# Patient Record
Sex: MALE | Race: WHITE | NOT HISPANIC OR LATINO | Employment: OTHER | URBAN - METROPOLITAN AREA
[De-identification: names, ages, dates, MRNs, and addresses within clinical notes are randomized per-mention and may not be internally consistent; named-entity substitution may affect disease eponyms.]

---

## 2017-07-27 ENCOUNTER — OFFICE VISIT (OUTPATIENT)
Dept: URGENT CARE | Facility: CLINIC | Age: 82
End: 2017-07-27
Payer: MEDICARE

## 2017-07-27 VITALS
BODY MASS INDEX: 29.82 KG/M2 | TEMPERATURE: 97 F | WEIGHT: 190 LBS | OXYGEN SATURATION: 95 % | HEART RATE: 69 BPM | RESPIRATION RATE: 18 BRPM | SYSTOLIC BLOOD PRESSURE: 132 MMHG | HEIGHT: 67 IN | DIASTOLIC BLOOD PRESSURE: 78 MMHG

## 2017-07-27 DIAGNOSIS — J02.9 ACUTE PHARYNGITIS, UNSPECIFIED ETIOLOGY: Primary | ICD-10-CM

## 2017-07-27 PROCEDURE — 96372 THER/PROPH/DIAG INJ SC/IM: CPT | Mod: S$GLB,,, | Performed by: INTERNAL MEDICINE

## 2017-07-27 PROCEDURE — 99213 OFFICE O/P EST LOW 20 MIN: CPT | Mod: 25,S$GLB,, | Performed by: INTERNAL MEDICINE

## 2017-07-27 PROCEDURE — 1159F MED LIST DOCD IN RCRD: CPT | Mod: S$GLB,,, | Performed by: INTERNAL MEDICINE

## 2017-07-27 RX ORDER — BETAMETHASONE SODIUM PHOSPHATE AND BETAMETHASONE ACETATE 3; 3 MG/ML; MG/ML
9 INJECTION, SUSPENSION INTRA-ARTICULAR; INTRALESIONAL; INTRAMUSCULAR; SOFT TISSUE ONCE
Status: COMPLETED | OUTPATIENT
Start: 2017-07-27 | End: 2017-07-27

## 2017-07-27 RX ORDER — ISOSORBIDE MONONITRATE 20 MG/1
10 TABLET ORAL 2 TIMES DAILY WITH MEALS
COMMUNITY
End: 2022-09-07 | Stop reason: SDUPTHER

## 2017-07-27 RX ORDER — NAPROXEN SODIUM 220 MG/1
81 TABLET, FILM COATED ORAL DAILY
COMMUNITY
End: 2022-09-07 | Stop reason: DRUGHIGH

## 2017-07-27 RX ORDER — AZITHROMYCIN 250 MG/1
TABLET, FILM COATED ORAL
Qty: 6 TABLET | Refills: 0 | Status: SHIPPED | OUTPATIENT
Start: 2017-07-27 | End: 2022-09-07 | Stop reason: SDUPTHER

## 2017-07-27 RX ORDER — METOPROLOL TARTRATE 100 MG/1
100 TABLET ORAL 2 TIMES DAILY
COMMUNITY

## 2017-07-27 RX ORDER — LEVOTHYROXINE SODIUM 100 UG/1
100 TABLET ORAL DAILY
COMMUNITY
End: 2022-11-22 | Stop reason: SDUPTHER

## 2017-07-27 RX ADMIN — BETAMETHASONE SODIUM PHOSPHATE AND BETAMETHASONE ACETATE 9 MG: 3; 3 INJECTION, SUSPENSION INTRA-ARTICULAR; INTRALESIONAL; INTRAMUSCULAR; SOFT TISSUE at 02:07

## 2017-07-27 NOTE — PROGRESS NOTES
"Subjective:       Patient ID: Claude T Dupuis is a 88 y.o. male.    Vitals:  height is 5' 7" (1.702 m) and weight is 86.2 kg (190 lb). His oral temperature is 97.1 °F (36.2 °C). His blood pressure is 132/78 and his pulse is 69. His respiration is 18 and oxygen saturation is 95%.     Chief Complaint: Sore Throat and Fever           Sore Throat    This is a new problem. The current episode started in the past 7 days. The problem has been unchanged. Neither side of throat is experiencing more pain than the other. The pain is at a severity of 10/10. The pain is severe. Associated symptoms include coughing. Pertinent negatives include no abdominal pain, congestion, ear pain, headaches, hoarse voice or shortness of breath. He has tried nothing for the symptoms.   Fever    Associated symptoms include coughing and a sore throat. Pertinent negatives include no abdominal pain, chest pain, congestion, ear pain, headaches, nausea or wheezing.     Review of Systems   Constitution: Positive for fever. Negative for chills and malaise/fatigue.   HENT: Positive for sore throat. Negative for congestion, ear pain, headaches and hoarse voice.    Eyes: Negative for discharge and redness.   Cardiovascular: Negative for chest pain, dyspnea on exertion and leg swelling.   Respiratory: Positive for cough. Negative for shortness of breath, sputum production and wheezing.    Musculoskeletal: Negative for myalgias.   Gastrointestinal: Negative for abdominal pain and nausea.       Objective:      Physical Exam    Assessment:       1. Acute pharyngitis, unspecified etiology        Plan:         Acute pharyngitis, unspecified etiology  -     azithromycin (ZITHROMAX Z-GORGE) 250 MG tablet; Take 2 tablets (500 mg) on  Day 1,  followed by 1 tablet (250 mg) once daily on Days 2 through 5.  Dispense: 6 tablet; Refill: 0          "

## 2018-06-28 ENCOUNTER — OFFICE VISIT (OUTPATIENT)
Dept: URGENT CARE | Facility: CLINIC | Age: 83
End: 2018-06-28
Payer: MEDICARE

## 2018-06-28 VITALS
HEART RATE: 74 BPM | OXYGEN SATURATION: 98 % | HEIGHT: 67 IN | SYSTOLIC BLOOD PRESSURE: 177 MMHG | BODY MASS INDEX: 29.82 KG/M2 | TEMPERATURE: 98 F | RESPIRATION RATE: 18 BRPM | DIASTOLIC BLOOD PRESSURE: 72 MMHG | WEIGHT: 190 LBS

## 2018-06-28 DIAGNOSIS — S66.911A STRAIN OF RIGHT WRIST, INITIAL ENCOUNTER: ICD-10-CM

## 2018-06-28 DIAGNOSIS — S66.911A STRAIN OF RIGHT WRIST, INITIAL ENCOUNTER: Primary | ICD-10-CM

## 2018-06-28 PROCEDURE — 99213 OFFICE O/P EST LOW 20 MIN: CPT | Mod: S$GLB,,, | Performed by: INTERNAL MEDICINE

## 2018-06-28 RX ORDER — NAPROXEN 500 MG/1
TABLET ORAL
Qty: 20 TABLET | Refills: 0 | Status: SHIPPED | OUTPATIENT
Start: 2018-06-28 | End: 2022-09-28

## 2018-06-28 RX ORDER — NAPROXEN 500 MG/1
500 TABLET ORAL 2 TIMES DAILY WITH MEALS
Qty: 20 TABLET | Refills: 2 | Status: SHIPPED | OUTPATIENT
Start: 2018-06-28 | End: 2018-06-28 | Stop reason: SDUPTHER

## 2018-06-28 NOTE — PROGRESS NOTES
"Subjective:       Patient ID: Claude T Dupuis is a 89 y.o. male.    Vitals:  height is 5' 7" (1.702 m) and weight is 86.2 kg (190 lb). His temperature is 97.5 °F (36.4 °C). His blood pressure is 177/72 (abnormal) and his pulse is 74. His respiration is 18 and oxygen saturation is 98%.     Chief Complaint: Trauma    Right wrist pain from over use       Trauma   The incident occurred 6 to 12 hours ago. The incident occurred at home. The injury mechanism was a twisted joint. The injury occurred in the context of other. There is an injury to the right hand (right hand). The pain is mild. Pertinent negatives include no abdominal pain, chest pain, neck pain, numbness or weakness. There have been no prior injuries to these areas. His tetanus status is unknown.     Review of Systems   Constitution: Negative for weakness and malaise/fatigue.   HENT: Negative for nosebleeds.    Cardiovascular: Negative for chest pain and syncope.   Respiratory: Negative for shortness of breath.    Musculoskeletal: Positive for joint swelling, muscle weakness and stiffness. Negative for arthritis, back pain and neck pain.   Gastrointestinal: Negative for abdominal pain.   Genitourinary: Negative for hematuria.   Neurological: Negative for dizziness and numbness.       Objective:      Physical Exam   Constitutional: He appears well-developed and well-nourished.   HENT:   Head: Normocephalic and atraumatic.   Cardiovascular: Intact distal pulses.    Musculoskeletal:   Pain on dorsum of R wrist with ROM; motor and sensory intact   Nursing note and vitals reviewed.      Assessment:       1. Strain of right wrist, initial encounter        Plan:         Strain of right wrist, initial encounter  -     naproxen (NAPROSYN) 500 MG tablet; Take 1 tablet (500 mg total) by mouth 2 (two) times daily with meals. for 10 days  Dispense: 20 tablet; Refill: 2  -     ORTHOPEDIC BRACING FOR HOME USE - UPPER EXTREMITY          "

## 2019-04-29 ENCOUNTER — OFFICE VISIT (OUTPATIENT)
Dept: URGENT CARE | Facility: CLINIC | Age: 84
End: 2019-04-29
Payer: MEDICARE

## 2019-04-29 VITALS
BODY MASS INDEX: 29.82 KG/M2 | DIASTOLIC BLOOD PRESSURE: 82 MMHG | OXYGEN SATURATION: 97 % | HEIGHT: 67 IN | RESPIRATION RATE: 12 BRPM | HEART RATE: 74 BPM | WEIGHT: 190 LBS | TEMPERATURE: 98 F | SYSTOLIC BLOOD PRESSURE: 158 MMHG

## 2019-04-29 DIAGNOSIS — R50.9 FEVER, UNSPECIFIED FEVER CAUSE: Primary | ICD-10-CM

## 2019-04-29 LAB
CTP QC/QA: YES
FLUAV AG NPH QL: NEGATIVE
FLUBV AG NPH QL: NEGATIVE

## 2019-04-29 PROCEDURE — 71046 X-RAY EXAM CHEST 2 VIEWS: CPT | Mod: FY,S$GLB,, | Performed by: RADIOLOGY

## 2019-04-29 PROCEDURE — 96372 THER/PROPH/DIAG INJ SC/IM: CPT | Mod: S$GLB,,, | Performed by: NURSE PRACTITIONER

## 2019-04-29 PROCEDURE — 99214 PR OFFICE/OUTPT VISIT, EST, LEVL IV, 30-39 MIN: ICD-10-PCS | Mod: 25,S$GLB,, | Performed by: NURSE PRACTITIONER

## 2019-04-29 PROCEDURE — 96372 PR INJECTION,THERAP/PROPH/DIAG2ST, IM OR SUBCUT: ICD-10-PCS | Mod: S$GLB,,, | Performed by: NURSE PRACTITIONER

## 2019-04-29 PROCEDURE — 99214 OFFICE O/P EST MOD 30 MIN: CPT | Mod: 25,S$GLB,, | Performed by: NURSE PRACTITIONER

## 2019-04-29 PROCEDURE — 71046 XR CHEST PA AND LATERAL: ICD-10-PCS | Mod: FY,S$GLB,, | Performed by: RADIOLOGY

## 2019-04-29 PROCEDURE — 87804 INFLUENZA ASSAY W/OPTIC: CPT | Mod: QW,S$GLB,, | Performed by: NURSE PRACTITIONER

## 2019-04-29 PROCEDURE — 87804 POCT INFLUENZA A/B: ICD-10-PCS | Mod: QW,S$GLB,, | Performed by: NURSE PRACTITIONER

## 2019-04-29 RX ORDER — ISOSORBIDE MONONITRATE 30 MG/1
TABLET, EXTENDED RELEASE ORAL DAILY
Refills: 0 | COMMUNITY
Start: 2019-03-29 | End: 2022-11-15 | Stop reason: SDUPTHER

## 2019-04-29 RX ORDER — LEVOTHYROXINE SODIUM 100 UG/1
TABLET ORAL
COMMUNITY
Start: 2016-06-01 | End: 2022-09-07 | Stop reason: SDUPTHER

## 2019-04-29 RX ORDER — TAMSULOSIN HYDROCHLORIDE 0.4 MG/1
CAPSULE ORAL
Refills: 5 | COMMUNITY
Start: 2019-04-12 | End: 2022-09-07

## 2019-04-29 RX ORDER — AZITHROMYCIN 250 MG/1
TABLET, FILM COATED ORAL
Qty: 6 TABLET | Refills: 0 | Status: SHIPPED | OUTPATIENT
Start: 2019-04-29 | End: 2022-09-07 | Stop reason: ALTCHOICE

## 2019-04-29 RX ORDER — DOCUSATE SODIUM 100 MG/1
CAPSULE, LIQUID FILLED ORAL
COMMUNITY
Start: 2016-06-01 | End: 2022-09-07

## 2019-04-29 RX ORDER — CEFTRIAXONE 1 G/1
1 INJECTION, POWDER, FOR SOLUTION INTRAMUSCULAR; INTRAVENOUS
Status: COMPLETED | OUTPATIENT
Start: 2019-04-29 | End: 2019-04-29

## 2019-04-29 RX ORDER — ISOSORBIDE MONONITRATE 30 MG/1
TABLET, EXTENDED RELEASE ORAL
COMMUNITY
Start: 2016-06-01 | End: 2022-09-07 | Stop reason: SDUPTHER

## 2019-04-29 RX ORDER — TAMSULOSIN HYDROCHLORIDE 0.4 MG/1
CAPSULE ORAL
COMMUNITY
Start: 2016-06-01 | End: 2022-09-07

## 2019-04-29 RX ORDER — LISINOPRIL 20 MG/1
20 TABLET ORAL DAILY
COMMUNITY
Start: 2017-06-01 | End: 2023-09-27

## 2019-04-29 RX ADMIN — CEFTRIAXONE 1 G: 1 INJECTION, POWDER, FOR SOLUTION INTRAMUSCULAR; INTRAVENOUS at 08:04

## 2019-04-29 NOTE — PROGRESS NOTES
"Subjective:       Patient ID: Claude T Dupuis is a 89 y.o. male.    Vitals:  height is 5' 7" (1.702 m) and weight is 86.2 kg (190 lb). His temperature is 97.9 °F (36.6 °C). His blood pressure is 158/82 (abnormal) and his pulse is 74. His respiration is 12.     Chief Complaint: No chief complaint on file.    Pt c/o fever and generalized weakness, x 2 days     Fever    This is a new problem. The problem occurs constantly. The problem has been unchanged. The maximum temperature noted was 102 to 102.9 F. The temperature was taken using an oral thermometer. Pertinent negatives include no congestion, coughing, ear pain, headaches, nausea, rash, sore throat, vomiting or wheezing. He has tried acetaminophen for the symptoms. The treatment provided mild relief.       Constitution: Positive for chills, fatigue, fever (102.0 last night ) and generalized weakness. Negative for sweating.   HENT: Negative for ear pain, congestion, sinus pain, sinus pressure, sore throat and voice change.    Neck: Negative for painful lymph nodes.   Eyes: Negative for eye redness.   Respiratory: Negative for chest tightness, cough, sputum production, bloody sputum, COPD, shortness of breath, stridor, wheezing and asthma.    Gastrointestinal: Negative for nausea and vomiting.   Musculoskeletal: Negative for muscle ache.   Skin: Negative for rash.   Allergic/Immunologic: Negative for seasonal allergies and asthma.   Neurological: Negative for dizziness, light-headedness and headaches.   Hematologic/Lymphatic: Negative for swollen lymph nodes.       Objective:      Physical Exam   Constitutional: He is oriented to person, place, and time. Vital signs are normal. He appears well-developed and well-nourished. He is active and cooperative.  Non-toxic appearance. He does not have a sickly appearance. He does not appear ill. No distress.   HENT:   Head: Normocephalic and atraumatic.   Right Ear: Hearing, tympanic membrane, external ear and ear canal " normal.   Left Ear: Hearing, tympanic membrane, external ear and ear canal normal.   Nose: No mucosal edema, rhinorrhea or nasal deformity. No epistaxis. Right sinus exhibits no maxillary sinus tenderness and no frontal sinus tenderness. Left sinus exhibits no maxillary sinus tenderness and no frontal sinus tenderness.   Mouth/Throat: Uvula is midline and mucous membranes are normal. No trismus in the jaw. Normal dentition. No uvula swelling. No oropharyngeal exudate, posterior oropharyngeal edema, posterior oropharyngeal erythema or tonsillar abscesses. No tonsillar exudate.   Eyes: Pupils are equal, round, and reactive to light. Conjunctivae, EOM and lids are normal. No scleral icterus.   Sclera clear bilat   Neck: Trachea normal, full passive range of motion without pain and phonation normal. Neck supple.   Cardiovascular: Normal rate, regular rhythm, normal heart sounds and intact distal pulses.   Pulses:       Radial pulses are 2+ on the right side, and 2+ on the left side.   Pulmonary/Chest: Effort normal and breath sounds normal. No respiratory distress.   Musculoskeletal: Normal range of motion. He exhibits no edema or deformity.   Neurological: He is alert and oriented to person, place, and time. He has normal strength. Gait normal.   Skin: Skin is warm, dry and intact. Capillary refill takes less than 2 seconds. No rash noted. He is not diaphoretic. No pallor.   Psychiatric: He has a normal mood and affect. His speech is normal and behavior is normal. Judgment and thought content normal. Cognition and memory are normal.   Nursing note and vitals reviewed.      Assessment:       1. Fever, unspecified fever cause        Results for orders placed or performed in visit on 04/29/19   POCT Influenza A/B   Result Value Ref Range    Rapid Influenza A Ag Negative Negative    Rapid Influenza B Ag Negative Negative     Acceptable Yes        Plan:       Vitals reassuring.  X-ray noted to be down at the  time and radiology unable to read the imaging. I will call and follow up with patient on tomorrow.  Patient showed no signs of acute respiratory distress.  Patient denies any chest pain shortness of breath or abdominal pain at this time.  The patient agreed with treatment plan.  Patient will follow-up with primary care provider for further evaluation.     Call patient to follow up. Patient states he feels a lot better today. Discussed radiology report. Patient verbalized understanding.    Fever, unspecified fever cause  -     POCT Influenza A/B  -     XR CHEST PA AND LATERAL; Future; Expected date: 04/29/2019  -     cefTRIAXone injection 1 g  -     azithromycin (Z-GORGE) 250 MG tablet; Take 2 tablets by mouth on day 1; Take 1 tablet by mouth on days 2-5  Dispense: 6 tablet; Refill: 0      Patient Instructions   If you were prescribed a narcotic or controlled medication, do not drive or operate heavy equipment or machinery while taking these medications.  You must understand that you've received an Urgent Care treatment only and that you may be released before all your medical problems are known or treated. You, the patient, will arrange for follow up care as instructed.  Follow up with your PCP or specialty clinic as directed within 2-5 days if not improved or as needed.  You can call [phone] to schedule an appointment with the appropriate provider.  If your condition worsens we recommend that you receive another evaluation at the emergency room immediately or contact your primary medical clinics after hours call service to discuss your concerns.  Please return here or go to the Emergency Department for any concerns or worsening of condition.      Pneumonia (Adult)  Pneumonia is an infection deep within the lungs. It is in the small air sacs (alveoli). Pneumonia may be caused by a virus or bacteria. Pneumonia caused by bacteria is usually treated with an antibiotic. Severe cases may need to be treated in the  hospital. Milder cases can be treated at home. Symptoms usually start to get better during the first 2 days of treatment.    Home care  Follow these guidelines when caring for yourself at home:  · Rest at home for the first 2 to 3 days, or until you feel stronger. Dont let yourself get overly tired when you go back to your activities.  · Stay away from cigarette smoke - yours or other peoples.  · You may use acetaminophen or ibuprofen to control fever or pain, unless another medicine was prescribed. If you have chronic liver or kidney disease, talk with your healthcare provider before using these medicines. Also talk with your provider if youve had a stomach ulcer or gastrointestinal bleeding. Dont give aspirin to anyone younger than 18 years of age who is ill with a fever. It may cause severe liver damage.  · Your appetite may be poor, so a light diet is fine.  · Drink 6 to 8 glasses of fluids every day to make sure you are getting enough fluids. Beverages can include water, sport drinks, sodas without caffeine, juices, tea, or soup. Fluids will help loosen secretions in the lung. This will make it easier for you to cough up the phlegm (sputum). If you also have heart or kidney disease, check with your healthcare provider before you drink extra fluids.  · Take antibiotic medicine prescribed until it is all gone, even if you are feeling better after a few days.  Follow-up care  Follow up with your healthcare provider in the next 2 to 3 days, or as advised. This is to be sure the medicine is helping you get better.  If you are 65 or older, you should get a pneumococcal vaccine and a yearly flu (influenza) shot. You should also get these vaccines if you have chronic lung disease like asthma, emphysema, or COPD. Recently, a second type of pneumonia vaccine has become available for everyone over 65 years old. This is in addition to the previous vaccine. Ask your provider about this.  When to seek medical  advice  Call your healthcare provider right away if any of these occur:  · You dont get better within the first 48 hours of treatment  · Shortness of breath gets worse  · Rapid breathing (more than 25 breaths per minute)  · Coughing up blood  · Chest pain gets worse with breathing  · Fever of 100.4°F (38°C) or higher that doesnt get better with fever medicine  · Weakness, dizziness, or fainting that gets worse  · Thirst or dry mouth that gets worse  · Sinus pain, headache, or a stiff neck  · Chest pain not caused by coughing  Date Last Reviewed: 1/1/2017  © 4159-4377 [employer]. [address] [place] [address], [place] [address]. All rights reserved. This information is not intended as a substitute for professional medical care. Always follow your healthcare professional's instructions.

## 2019-04-30 NOTE — PATIENT INSTRUCTIONS
If you were prescribed a narcotic or controlled medication, do not drive or operate heavy equipment or machinery while taking these medications.  You must understand that you've received an Urgent Care treatment only and that you may be released before all your medical problems are known or treated. You, the patient, will arrange for follow up care as instructed.  Follow up with your PCP or specialty clinic as directed within 2-5 days if not improved or as needed.  You can call [phone] to schedule an appointment with the appropriate provider.  If your condition worsens we recommend that you receive another evaluation at the emergency room immediately or contact your primary medical clinics after hours call service to discuss your concerns.  Please return here or go to the Emergency Department for any concerns or worsening of condition.      Pneumonia (Adult)  Pneumonia is an infection deep within the lungs. It is in the small air sacs (alveoli). Pneumonia may be caused by a virus or bacteria. Pneumonia caused by bacteria is usually treated with an antibiotic. Severe cases may need to be treated in the hospital. Milder cases can be treated at home. Symptoms usually start to get better during the first 2 days of treatment.    Home care  Follow these guidelines when caring for yourself at home:  · Rest at home for the first 2 to 3 days, or until you feel stronger. Dont let yourself get overly tired when you go back to your activities.  · Stay away from cigarette smoke - yours or other peoples.  · You may use acetaminophen or ibuprofen to control fever or pain, unless another medicine was prescribed. If you have chronic liver or kidney disease, talk with your healthcare provider before using these medicines. Also talk with your provider if youve had a stomach ulcer or gastrointestinal bleeding. Dont give aspirin to anyone younger than 18 years of age who is ill with a fever. It may cause severe liver  damage.  · Your appetite may be poor, so a light diet is fine.  · Drink 6 to 8 glasses of fluids every day to make sure you are getting enough fluids. Beverages can include water, sport drinks, sodas without caffeine, juices, tea, or soup. Fluids will help loosen secretions in the lung. This will make it easier for you to cough up the phlegm (sputum). If you also have heart or kidney disease, check with your healthcare provider before you drink extra fluids.  · Take antibiotic medicine prescribed until it is all gone, even if you are feeling better after a few days.  Follow-up care  Follow up with your healthcare provider in the next 2 to 3 days, or as advised. This is to be sure the medicine is helping you get better.  If you are 65 or older, you should get a pneumococcal vaccine and a yearly flu (influenza) shot. You should also get these vaccines if you have chronic lung disease like asthma, emphysema, or COPD. Recently, a second type of pneumonia vaccine has become available for everyone over 65 years old. This is in addition to the previous vaccine. Ask your provider about this.  When to seek medical advice  Call your healthcare provider right away if any of these occur:  · You dont get better within the first 48 hours of treatment  · Shortness of breath gets worse  · Rapid breathing (more than 25 breaths per minute)  · Coughing up blood  · Chest pain gets worse with breathing  · Fever of 100.4°F (38°C) or higher that doesnt get better with fever medicine  · Weakness, dizziness, or fainting that gets worse  · Thirst or dry mouth that gets worse  · Sinus pain, headache, or a stiff neck  · Chest pain not caused by coughing  Date Last Reviewed: 1/1/2017  © 0703-4319 [employer]. [address] [place] [address], [place] [address]. All rights reserved. This information is not intended as a substitute for professional medical care. Always follow your healthcare professional's instructions.

## 2021-07-26 ENCOUNTER — OFFICE VISIT (OUTPATIENT)
Dept: URGENT CARE | Facility: CLINIC | Age: 86
End: 2021-07-26
Payer: MEDICARE

## 2021-07-26 VITALS
WEIGHT: 195 LBS | SYSTOLIC BLOOD PRESSURE: 164 MMHG | RESPIRATION RATE: 16 BRPM | HEIGHT: 67 IN | BODY MASS INDEX: 30.61 KG/M2 | DIASTOLIC BLOOD PRESSURE: 70 MMHG | OXYGEN SATURATION: 94 % | HEART RATE: 67 BPM | TEMPERATURE: 99 F

## 2021-07-26 DIAGNOSIS — U07.1 COVID-19 VIRUS INFECTION: Primary | ICD-10-CM

## 2021-07-26 LAB
CTP QC/QA: YES
SARS-COV-2 RDRP RESP QL NAA+PROBE: POSITIVE

## 2021-07-26 PROCEDURE — U0002: ICD-10-PCS | Mod: QW,S$GLB,, | Performed by: STUDENT IN AN ORGANIZED HEALTH CARE EDUCATION/TRAINING PROGRAM

## 2021-07-26 PROCEDURE — 1160F RVW MEDS BY RX/DR IN RCRD: CPT | Mod: CPTII,S$GLB,, | Performed by: STUDENT IN AN ORGANIZED HEALTH CARE EDUCATION/TRAINING PROGRAM

## 2021-07-26 PROCEDURE — 1160F PR REVIEW ALL MEDS BY PRESCRIBER/CLIN PHARMACIST DOCUMENTED: ICD-10-PCS | Mod: CPTII,S$GLB,, | Performed by: STUDENT IN AN ORGANIZED HEALTH CARE EDUCATION/TRAINING PROGRAM

## 2021-07-26 PROCEDURE — U0002 COVID-19 LAB TEST NON-CDC: HCPCS | Mod: QW,S$GLB,, | Performed by: STUDENT IN AN ORGANIZED HEALTH CARE EDUCATION/TRAINING PROGRAM

## 2021-07-26 PROCEDURE — 99212 PR OFFICE/OUTPT VISIT, EST, LEVL II, 10-19 MIN: ICD-10-PCS | Mod: S$GLB,,, | Performed by: STUDENT IN AN ORGANIZED HEALTH CARE EDUCATION/TRAINING PROGRAM

## 2021-07-26 PROCEDURE — 1159F PR MEDICATION LIST DOCUMENTED IN MEDICAL RECORD: ICD-10-PCS | Mod: CPTII,S$GLB,, | Performed by: STUDENT IN AN ORGANIZED HEALTH CARE EDUCATION/TRAINING PROGRAM

## 2021-07-26 PROCEDURE — 99212 OFFICE O/P EST SF 10 MIN: CPT | Mod: S$GLB,,, | Performed by: STUDENT IN AN ORGANIZED HEALTH CARE EDUCATION/TRAINING PROGRAM

## 2021-07-26 PROCEDURE — 1159F MED LIST DOCD IN RCRD: CPT | Mod: CPTII,S$GLB,, | Performed by: STUDENT IN AN ORGANIZED HEALTH CARE EDUCATION/TRAINING PROGRAM

## 2022-09-07 ENCOUNTER — OFFICE VISIT (OUTPATIENT)
Dept: CARDIOLOGY | Facility: CLINIC | Age: 87
End: 2022-09-07
Payer: MEDICARE

## 2022-09-07 ENCOUNTER — LAB VISIT (OUTPATIENT)
Dept: LAB | Facility: HOSPITAL | Age: 87
End: 2022-09-07
Attending: INTERNAL MEDICINE
Payer: MEDICARE

## 2022-09-07 VITALS
SYSTOLIC BLOOD PRESSURE: 144 MMHG | HEIGHT: 67 IN | WEIGHT: 189.38 LBS | DIASTOLIC BLOOD PRESSURE: 72 MMHG | BODY MASS INDEX: 29.72 KG/M2 | HEART RATE: 52 BPM

## 2022-09-07 DIAGNOSIS — I20.9 ANGINA PECTORIS: ICD-10-CM

## 2022-09-07 DIAGNOSIS — Z95.1 HX OF CABG: ICD-10-CM

## 2022-09-07 DIAGNOSIS — I50.31 ACUTE DIASTOLIC HEART FAILURE: ICD-10-CM

## 2022-09-07 DIAGNOSIS — L97.922 ULCER OF LEFT LOWER EXTREMITY WITH FAT LAYER EXPOSED: Primary | ICD-10-CM

## 2022-09-07 DIAGNOSIS — M79.605 LEFT LEG PAIN: ICD-10-CM

## 2022-09-07 DIAGNOSIS — R60.0 EDEMA OF BOTH LOWER EXTREMITIES: ICD-10-CM

## 2022-09-07 DIAGNOSIS — I87.2 VENOUS STASIS DERMATITIS OF BOTH LOWER EXTREMITIES: ICD-10-CM

## 2022-09-07 DIAGNOSIS — L97.922 ULCER OF LEFT LOWER EXTREMITY WITH FAT LAYER EXPOSED: ICD-10-CM

## 2022-09-07 DIAGNOSIS — R60.0 LOCALIZED EDEMA: ICD-10-CM

## 2022-09-07 LAB
ALBUMIN SERPL BCP-MCNC: 3.5 G/DL (ref 3.5–5.2)
ALP SERPL-CCNC: 77 U/L (ref 55–135)
ALT SERPL W/O P-5'-P-CCNC: 14 U/L (ref 10–44)
ANION GAP SERPL CALC-SCNC: 7 MMOL/L (ref 8–16)
AST SERPL-CCNC: 19 U/L (ref 10–40)
BILIRUB SERPL-MCNC: 0.6 MG/DL (ref 0.1–1)
BUN SERPL-MCNC: 19 MG/DL (ref 10–30)
CALCIUM SERPL-MCNC: 9.6 MG/DL (ref 8.7–10.5)
CHLORIDE SERPL-SCNC: 101 MMOL/L (ref 95–110)
CO2 SERPL-SCNC: 31 MMOL/L (ref 23–29)
CREAT SERPL-MCNC: 1 MG/DL (ref 0.5–1.4)
EST. GFR  (NO RACE VARIABLE): >60 ML/MIN/1.73 M^2
GLUCOSE SERPL-MCNC: 137 MG/DL (ref 70–110)
POTASSIUM SERPL-SCNC: 4 MMOL/L (ref 3.5–5.1)
PROT SERPL-MCNC: 7.1 G/DL (ref 6–8.4)
SODIUM SERPL-SCNC: 139 MMOL/L (ref 136–145)

## 2022-09-07 PROCEDURE — 99205 PR OFFICE/OUTPT VISIT, NEW, LEVL V, 60-74 MIN: ICD-10-PCS | Mod: S$GLB,,, | Performed by: INTERNAL MEDICINE

## 2022-09-07 PROCEDURE — 1101F PT FALLS ASSESS-DOCD LE1/YR: CPT | Mod: CPTII,S$GLB,, | Performed by: INTERNAL MEDICINE

## 2022-09-07 PROCEDURE — 1101F PR PT FALLS ASSESS DOC 0-1 FALLS W/OUT INJ PAST YR: ICD-10-PCS | Mod: CPTII,S$GLB,, | Performed by: INTERNAL MEDICINE

## 2022-09-07 PROCEDURE — 99205 OFFICE O/P NEW HI 60 MIN: CPT | Mod: S$GLB,,, | Performed by: INTERNAL MEDICINE

## 2022-09-07 PROCEDURE — 36415 COLL VENOUS BLD VENIPUNCTURE: CPT | Mod: PO | Performed by: INTERNAL MEDICINE

## 2022-09-07 PROCEDURE — 80053 COMPREHEN METABOLIC PANEL: CPT | Performed by: INTERNAL MEDICINE

## 2022-09-07 PROCEDURE — 1126F PR PAIN SEVERITY QUANTIFIED, NO PAIN PRESENT: ICD-10-PCS | Mod: CPTII,S$GLB,, | Performed by: INTERNAL MEDICINE

## 2022-09-07 PROCEDURE — 99999 PR PBB SHADOW E&M-EST. PATIENT-LVL III: CPT | Mod: PBBFAC,,, | Performed by: INTERNAL MEDICINE

## 2022-09-07 PROCEDURE — 99999 PR PBB SHADOW E&M-EST. PATIENT-LVL III: ICD-10-PCS | Mod: PBBFAC,,, | Performed by: INTERNAL MEDICINE

## 2022-09-07 PROCEDURE — 1159F MED LIST DOCD IN RCRD: CPT | Mod: CPTII,S$GLB,, | Performed by: INTERNAL MEDICINE

## 2022-09-07 PROCEDURE — 1159F PR MEDICATION LIST DOCUMENTED IN MEDICAL RECORD: ICD-10-PCS | Mod: CPTII,S$GLB,, | Performed by: INTERNAL MEDICINE

## 2022-09-07 PROCEDURE — 1126F AMNT PAIN NOTED NONE PRSNT: CPT | Mod: CPTII,S$GLB,, | Performed by: INTERNAL MEDICINE

## 2022-09-07 PROCEDURE — 3288F FALL RISK ASSESSMENT DOCD: CPT | Mod: CPTII,S$GLB,, | Performed by: INTERNAL MEDICINE

## 2022-09-07 PROCEDURE — 3288F PR FALLS RISK ASSESSMENT DOCUMENTED: ICD-10-PCS | Mod: CPTII,S$GLB,, | Performed by: INTERNAL MEDICINE

## 2022-09-07 RX ORDER — ASPIRIN 325 MG
325 TABLET ORAL DAILY
COMMUNITY

## 2022-09-07 RX ORDER — DOXYCYCLINE HYCLATE 100 MG
100 TABLET ORAL 2 TIMES DAILY
Qty: 28 TABLET | Refills: 1 | Status: SHIPPED | OUTPATIENT
Start: 2022-09-07 | End: 2022-09-21

## 2022-09-07 RX ORDER — BUMETANIDE 0.5 MG/1
0.5 TABLET ORAL 2 TIMES DAILY
Qty: 180 TABLET | Refills: 3 | Status: SHIPPED | OUTPATIENT
Start: 2022-09-07 | End: 2023-09-27 | Stop reason: SDUPTHER

## 2022-09-07 NOTE — PROGRESS NOTES
"[facility]      Chief Complaint   Patient presents with    Peripheral Vascular Disease    Venous Ulcer       Patient ID: Claude T Dupuis is a 89 y.o. male with CAD s/p CABG (2007), HTN, colon cancer s/p cancer, Guillain [place] Syndrome, who presents for an initial appointment.  Pertinent history/events are as follows:     -Pt presents for evaluation of left leg wound/ulcer.      HPI:  Mr. Cox is accompanied by his son.  He reports 6 weeks ago he "burned his left leg with hot water while getting into the shower.  States a large blister formed at the left lower leg.  He notes pain and burning sensation at the left lower leg.  States he went to the ED at [facility] 10 days after the injury and they "popped the blister and gave me antibiotics".        Past Medical History:   Diagnosis Date    Cancer     Colon cancer     Coronary artery disease     Hypertension      Past Surgical History:   Procedure Laterality Date    VEIN BYPASS SURGERY       Social History     Socioeconomic History    Marital status:    Tobacco Use    Smoking status: Never    Smokeless tobacco: Never   Substance and Sexual Activity    Alcohol use: No    Drug use: No    Sexual activity: Never     Family History   Problem Relation Age of Onset    Diabetes Mother        Review of patient's allergies indicates:  No Known Allergies    Medication List with Changes/Refills   Current Medications    ASPIRIN 325 MG TABLET    Take 325 mg by mouth once daily.    ISOSORBIDE MONONITRATE (IMDUR) 30 MG 24 HR TABLET    TK 1 T PO BID    LEVOTHYROXINE (SYNTHROID) 100 MCG TABLET    Take 100 mcg by mouth once daily.    LISINOPRIL (PRINIVIL,ZESTRIL) 20 MG TABLET    Take 20 mg by mouth once daily.    METOPROLOL TARTRATE (LOPRESSOR) 100 MG TABLET    Take 100 mg by mouth 2 (two) times daily.    NAPROXEN (NAPROSYN) 500 MG TABLET    TAKE 1 TABLET BY MOUTH TWICE DAILY WITH MEALS FOR 10 DAYS   Discontinued Medications    ASPIRIN 81 MG CHEW    Take 81 " "mg by mouth once daily.    AZITHROMYCIN (Z-GORGE) 250 MG TABLET    Take 2 tablets by mouth on day 1; Take 1 tablet by mouth on days 2-5    AZITHROMYCIN (ZITHROMAX Z-GORGE) 250 MG TABLET    Take 2 tablets (500 mg) on  Day 1,  followed by 1 tablet (250 mg) once daily on Days 2 through 5.    CHLORPHENIRAMINE-PSEUDOEPHEDRINE-ACETAMINOPHEN (SINUTAB) 2- MG PER TABLET    Take by mouth.    DOCUSATE SODIUM (COLACE) 100 MG CAPSULE    Take by mouth.    ISOSORBIDE MONONITRATE (IMDUR) 30 MG 24 HR TABLET    Take by mouth.    ISOSORBIDE MONONITRATE (ISMO,MONOKET) 20 MG TAB    Take 10 mg by mouth 2 (two) times daily with meals.    LEVOTHYROXINE (SYNTHROID) 100 MCG TABLET    Take by mouth.    TAMSULOSIN (FLOMAX) 0.4 MG CAP    TK 1 C PO BID    TAMSULOSIN (FLOMAX) 0.4 MG CAP    Take by mouth.       Review of Systems  Constitution: Denies chills, fever, and sweats.  HENT: Denies headaches or blurry vision.  Cardiovascular: Denies chest pain or irregular heart beat.  Respiratory: Denies cough or shortness of breath.  Gastrointestinal: Denies abdominal pain, nausea, or vomiting.  Musculoskeletal: Denies muscle cramps.  Neurological: Positive for left leg wound with pain and swelling.  Psychiatric/Behavioral: Normal mental status.  Hematologic/Lymphatic: Denies bleeding problem or easy bruising/bleeding.  Skin: Denies rash or suspicious lesions    Physical Examination  BP (!) 144/72   Pulse (!) 52   Ht 5' 7" (1.702 m)   Wt 85.9 kg (189 lb 6 oz)   BMI 29.66 kg/m²     Constitutional: No acute distress, conversant  HEENT: Sclera anicteric, Pupils equal, round and reactive to light, extraocular motions intact, Oropharynx clear  Neck: No JVD, no carotid bruits  Cardiovascular: regular rate and rhythm, no murmur, rubs or gallops, normal S1/S2  Pulmonary: Clear to auscultation bilaterally  Abdominal: Abdomen soft, nontender, nondistended, positive bowel sounds  Extremities: BLE's with 1 pitting edema and venous stasis dermatitis  LLE with " 4 x 5.5 cm ulcer/wound at medial aspect near ankle   Pulses:  Carotid pulses are 2+ on the right side, and 2+ on the left side.  Radial pulses are 2+ on the right side, and 2+ on the left side.   Femoral pulses are 2+ on the right side, and 2+ on the left side.  Popliteal pulses are 2+ on the right side, and 2+ on the left side.   Dorsalis pedis pulses are 2+ on the right side, and 2+ on the left side.   Posterior tibial pulses are 2+ on the right side, and 2+ on the left side.    Skin: No ecchymosis, erythema, or ulcers  Psych: Alert and oriented x 3, appropriate affect  Neuro: CNII-XII intact, no focal deficits    Labs:  Most Recent Data  CBC: No results found for: WBC, HGB, HCT, PLT, MCV, RDW  BMP: No results found for: NA, K, CL, CO2, BUN, CREATININE, GLU, CALCIUM, MG, PHOS  LFTS; No results found for: PROT, ALBUMIN, BILITOT, AST, ALKPHOS, ALT, GGT  COAGS: No results found for: INR, PROTIME, PTT  FLP: No results found for: CHOL, HDL, LDLCALC, TRIG, CHOLHDL  CARDIAC: No results found for: TROPONINI, CKMB, BNP    Assessment/Plan:  Claude T Dupuis is a 89 y.o. male with CAD s/p CABG (2007), HTN, colon cancer s/p cancer, Guillain [place] Syndrome, who presents for an initial appointment.    1. LLE Wound/Ulcer- Likely due to burn with possible underlying venous insufficiency.  Check BLE venous reflux study, toe pressure study and arterial ultrasound.  Refer to wound care.  Treat with doxycycline 100 mg bid for 14 days.  Start bumex 0.5 mg bid.  Check cmp today and in 1 week.  Elevate legs when resting.  Pt to use Tylenol for pain.     2. CAD s/p CABD- No angina currently.  Continue current medications.    3. HTN- Controlled.  Continue current medications.      Follow up in 3 weeks     Total duration of face to face visit time 30 minutes.  Total time spent counseling greater than fifty percent of total visit time.  Counseling included discussion regarding imaging findings, diagnosis, possibilities, treatment options,  risks and benefits.  The patient had many questions regarding the options and long-term effects.    Jose Antonio Lewis MD, PhD  Interventional Cardiology

## 2022-09-07 NOTE — PATIENT INSTRUCTIONS
Assessment/Plan:  Claude T Dupuis is a 89 y.o. male with CAD s/p CABG (2007), HTN, colon cancer s/p cancer, Guillain [place] Syndrome, who presents for an initial appointment.    1. LLE Wound/Ulcer- Likely due to burn with possible underlying venous insufficiency.  Check BLE venous reflux study, toe pressure study and arterial ultrasound.  Refer to wound care.  Treat with doxycycline 100 mg bid for 14 days.  Start bumex 0.5 mg bid.  Check cmp today and in 1 week.  Elevate legs when resting.  Pt to use Tylenol for pain.     2. CAD s/p CABD- No angina currently.  Continue current medications.    3. HTN- Controlled.  Continue current medications.      Follow up in 3 weeks

## 2022-09-08 ENCOUNTER — OFFICE VISIT (OUTPATIENT)
Dept: WOUND CARE | Facility: CLINIC | Age: 87
End: 2022-09-08
Payer: MEDICARE

## 2022-09-08 VITALS
WEIGHT: 189.38 LBS | BODY MASS INDEX: 29.72 KG/M2 | TEMPERATURE: 99 F | DIASTOLIC BLOOD PRESSURE: 71 MMHG | HEIGHT: 67 IN | HEART RATE: 67 BPM | SYSTOLIC BLOOD PRESSURE: 125 MMHG

## 2022-09-08 DIAGNOSIS — I50.31 ACUTE DIASTOLIC HEART FAILURE: ICD-10-CM

## 2022-09-08 DIAGNOSIS — I87.2 VENOUS STASIS DERMATITIS OF BOTH LOWER EXTREMITIES: ICD-10-CM

## 2022-09-08 DIAGNOSIS — L97.922 ULCER OF LEFT LOWER EXTREMITY WITH FAT LAYER EXPOSED: Primary | ICD-10-CM

## 2022-09-08 DIAGNOSIS — R60.0 EDEMA OF BOTH LOWER EXTREMITIES: ICD-10-CM

## 2022-09-08 PROCEDURE — 1159F MED LIST DOCD IN RCRD: CPT | Mod: CPTII,S$GLB,,

## 2022-09-08 PROCEDURE — 1101F PR PT FALLS ASSESS DOC 0-1 FALLS W/OUT INJ PAST YR: ICD-10-PCS | Mod: CPTII,S$GLB,,

## 2022-09-08 PROCEDURE — 3288F FALL RISK ASSESSMENT DOCD: CPT | Mod: CPTII,S$GLB,,

## 2022-09-08 PROCEDURE — 99205 PR OFFICE/OUTPT VISIT, NEW, LEVL V, 60-74 MIN: ICD-10-PCS | Mod: S$GLB,,,

## 2022-09-08 PROCEDURE — 99999 PR PBB SHADOW E&M-EST. PATIENT-LVL III: ICD-10-PCS | Mod: PBBFAC,,,

## 2022-09-08 PROCEDURE — 3288F PR FALLS RISK ASSESSMENT DOCUMENTED: ICD-10-PCS | Mod: CPTII,S$GLB,,

## 2022-09-08 PROCEDURE — 99999 PR PBB SHADOW E&M-EST. PATIENT-LVL III: CPT | Mod: PBBFAC,,,

## 2022-09-08 PROCEDURE — 1125F AMNT PAIN NOTED PAIN PRSNT: CPT | Mod: CPTII,S$GLB,,

## 2022-09-08 PROCEDURE — 1101F PT FALLS ASSESS-DOCD LE1/YR: CPT | Mod: CPTII,S$GLB,,

## 2022-09-08 PROCEDURE — 1159F PR MEDICATION LIST DOCUMENTED IN MEDICAL RECORD: ICD-10-PCS | Mod: CPTII,S$GLB,,

## 2022-09-08 PROCEDURE — 1125F PR PAIN SEVERITY QUANTIFIED, PAIN PRESENT: ICD-10-PCS | Mod: CPTII,S$GLB,,

## 2022-09-08 PROCEDURE — 99205 OFFICE O/P NEW HI 60 MIN: CPT | Mod: S$GLB,,,

## 2022-09-08 NOTE — PROGRESS NOTES
Subjective:       Patient ID: Claude T Dupuis is a 89 y.o. 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 Patient states this started about 6 weeks ago. He states he burned his leg when he was showering with hot water. A blister formed, and patient went to [facility] ED 10 days later. He reports that the ED provider popped the blister. He was instructed to use Bactroban to the affected area 3x a day. He was prescribed doxy for 10 days and prednisone for 4 days. Patient followed with Dr. Lewis on 9/7/22. He was prescribed doxy for 14 days and bumetanide. Patient reports not picking up prescriptions yet. Dr. Lewis ordered BLE venous reflux study, toe pressure study, and an arterial ultrasound. Patient reports excessive drainage and reports leaving the wound open to air. Denies fever, chills, erythema, warmth, or purulent drainage.    Review of Systems   Constitutional:  Negative for activity change, chills, diaphoresis, fatigue and fever.   Respiratory:  Negative for apnea, chest tightness and shortness of breath.    Cardiovascular:  Positive for leg swelling. Negative for chest pain and palpitations.   Musculoskeletal:  Negative for gait problem and joint swelling.   Skin:  Positive for wound. Negative for pallor and rash.   Neurological:  Negative for syncope, weakness and numbness.   Psychiatric/Behavioral:  Negative for agitation. The patient is not nervous/anxious.    All other systems reviewed and are negative.    Objective:      Physical Exam  Vitals reviewed.   Constitutional:       General: He is not in acute distress.     Appearance: Normal appearance.   HENT:      Right Ear: Decreased hearing noted.      Left Ear: Decreased hearing noted.   Cardiovascular:       Rate and Rhythm: Normal rate and regular rhythm.   Pulmonary:      Effort: No respiratory distress.   Musculoskeletal:         General: No swelling.      Right lower leg: Edema present.      Left lower leg: Edema present.   Skin:     General: Skin is warm and dry.      Findings: Wound present. No erythema.          Neurological:      General: No focal deficit present.      Mental Status: He is alert and oriented to person, place, and time.   Psychiatric:         Mood and Affect: Mood normal.         Behavior: Behavior normal.         Thought Content: Thought content normal.         Judgment: Judgment normal.       Assessment:       1. Ulcer of left lower extremity with fat layer exposed    2. Acute diastolic heart failure    3. Venous stasis dermatitis of both lower extremities    4. Edema of both lower extremities           Altered Skin Integrity Left lower;medial Leg (Active)       Altered Skin Integrity Present on Admission:    Side: Left   Orientation: lower;medial   Location: Leg   Wound Number:    Is this injury device related?:    Primary Wound Type:    Description of Altered Skin Integrity:    Ankle-Brachial Index:    Pulses:    Removal Indication and Assessment:    Wound Outcome:    (Retired) Wound Length (cm):    (Retired) Wound Width (cm):    (Retired) Depth (cm):    Wound Description (Comments):    Removal Indications:    Wound Image   09/08/22 0935   Dressing Appearance Open to air 09/08/22 0935   Drainage Amount Large 09/08/22 0935   Drainage Characteristics/Odor Clear;Yellow 09/08/22 0935   Red (%), Wound Tissue Color 100 % 09/08/22 0935   Periwound Area Intact;Macerated;Edematous 09/08/22 0935   Wound Length (cm) 4.9 cm 09/08/22 0935   Wound Width (cm) 5.8 cm 09/08/22 0935   Wound Depth (cm) 0 cm 09/08/22 0935   Wound Volume (cm^3) 0 cm^3 09/08/22 0935   Wound Surface Area (cm^2) 28.42 cm^2 09/08/22 0935   Care Cleansed with:;Soap and water;Wound cleanser 09/08/22 0935   Dressing Applied;Calcium  alginate;Island/border 09/08/22 0935   Periwound Care Skin barrier film applied 09/08/22 0935       Claude was seen in the clinic room and placed in the supine position on the treatment table.  The wound was noted to be open to air.The leg was cleansed with Easi-clense sponges and dried thoroughly. Removed dried drainage with long qtip. Pt tolerated well.     Plan of Care: Aquacel border dressing to cover the wound. Dressing will be changed every 7 days.     Plan:     Left lower extremity dressed as detailed above.   Patient was instructed to not get the dressings wet and to use cast covers for showering.  Should the dressing become wet, he is to remove it, place another aquacel border dressing to the area. He should then notify this office as soon as possible to have a new dressing applied.  Discussed home health. Deferred at this time.     Discussed PVD and PAD with patient. Patient has history of severe stenosis of right popliteal artery and mild stenosis of the proximal left SFA. Patient's son states that Dr. Lewis instructed patient to not utilize compression for left lower extremity. Will consider utilizing compression after toe pressure study, arterial ultrasound, and venous reflux study. Instructed patient to minimize ambulation, elevate legs whenever sedentary, and follow a low sodium diet.       Discussed nutrition and the role of protein in wound healing with the patient. Instructed patient to eat 5 small meals of protein a day. Gave a handout illustrating healthy protein options. Pt voiced understanding.    Discussed stages of wound healing with patient and conditions that contribute to delayed wound healing.    Instructed patient to  prescriptions for doxy and bumetanide- take medication as prescribed.    Written and verbal instructions given to patient.  RTC in 1 week    Aneta Olmstead PA-C       60  minutes of total time spent on the encounter, which includes face to face time and non-face to  face time preparing to see the patient (eg, review of tests), Obtaining and/or reviewing separately obtained history, Documenting clinical information in the electronic or other health record, Independently interpreting results (not separately reported) and communicating results to the patient/family/caregiver, or Care coordination (not separately reported).

## 2022-09-15 ENCOUNTER — OFFICE VISIT (OUTPATIENT)
Dept: WOUND CARE | Facility: CLINIC | Age: 87
End: 2022-09-15
Payer: MEDICARE

## 2022-09-15 VITALS
WEIGHT: 185.19 LBS | SYSTOLIC BLOOD PRESSURE: 139 MMHG | DIASTOLIC BLOOD PRESSURE: 71 MMHG | HEIGHT: 67 IN | TEMPERATURE: 98 F | HEART RATE: 58 BPM | BODY MASS INDEX: 29.07 KG/M2

## 2022-09-15 DIAGNOSIS — L97.922 ULCER OF LEFT LOWER EXTREMITY WITH FAT LAYER EXPOSED: Primary | ICD-10-CM

## 2022-09-15 DIAGNOSIS — I50.31 ACUTE DIASTOLIC HEART FAILURE: ICD-10-CM

## 2022-09-15 DIAGNOSIS — I87.2 VENOUS STASIS DERMATITIS OF BOTH LOWER EXTREMITIES: ICD-10-CM

## 2022-09-15 DIAGNOSIS — R60.0 EDEMA OF BOTH LOWER EXTREMITIES: ICD-10-CM

## 2022-09-15 PROCEDURE — 1101F PT FALLS ASSESS-DOCD LE1/YR: CPT | Mod: CPTII,S$GLB,,

## 2022-09-15 PROCEDURE — 3288F FALL RISK ASSESSMENT DOCD: CPT | Mod: CPTII,S$GLB,,

## 2022-09-15 PROCEDURE — 99999 PR PBB SHADOW E&M-EST. PATIENT-LVL III: CPT | Mod: PBBFAC,,,

## 2022-09-15 PROCEDURE — 99214 PR OFFICE/OUTPT VISIT, EST, LEVL IV, 30-39 MIN: ICD-10-PCS | Mod: S$GLB,,,

## 2022-09-15 PROCEDURE — 1101F PR PT FALLS ASSESS DOC 0-1 FALLS W/OUT INJ PAST YR: ICD-10-PCS | Mod: CPTII,S$GLB,,

## 2022-09-15 PROCEDURE — 99499 RISK ADDL DX/OHS AUDIT: ICD-10-PCS | Mod: S$GLB,,,

## 2022-09-15 PROCEDURE — 1159F PR MEDICATION LIST DOCUMENTED IN MEDICAL RECORD: ICD-10-PCS | Mod: CPTII,S$GLB,,

## 2022-09-15 PROCEDURE — 1125F AMNT PAIN NOTED PAIN PRSNT: CPT | Mod: CPTII,S$GLB,,

## 2022-09-15 PROCEDURE — 1159F MED LIST DOCD IN RCRD: CPT | Mod: CPTII,S$GLB,,

## 2022-09-15 PROCEDURE — 99499 UNLISTED E&M SERVICE: CPT | Mod: S$GLB,,,

## 2022-09-15 PROCEDURE — 99999 PR PBB SHADOW E&M-EST. PATIENT-LVL III: ICD-10-PCS | Mod: PBBFAC,,,

## 2022-09-15 PROCEDURE — 99214 OFFICE O/P EST MOD 30 MIN: CPT | Mod: S$GLB,,,

## 2022-09-15 PROCEDURE — 3288F PR FALLS RISK ASSESSMENT DOCUMENTED: ICD-10-PCS | Mod: CPTII,S$GLB,,

## 2022-09-15 PROCEDURE — 1125F PR PAIN SEVERITY QUANTIFIED, PAIN PRESENT: ICD-10-PCS | Mod: CPTII,S$GLB,,

## 2022-09-15 NOTE — PROGRESS NOTES
Subjective:       Patient ID: Claude T Dupuis is a 89 y.o. 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 Patient followed with Dr. Lewis on 9/7/22. He was prescribed doxy for 14 days and bumetanide. Pt reports compliance with medication. Dr. Lewis ordered BLE venous reflux study, toe pressure study, and an arterial ultrasound. Denies fever, chills, erythema, warmth, or purulent drainage.    Review of Systems   Constitutional:  Negative for activity change, chills, diaphoresis, fatigue and fever.   Respiratory:  Negative for apnea, chest tightness and shortness of breath.    Cardiovascular:  Positive for leg swelling. Negative for chest pain and palpitations.   Musculoskeletal:  Negative for gait problem and joint swelling.   Skin:  Positive for wound. Negative for pallor and rash.   Neurological:  Negative for syncope, weakness and numbness.   Psychiatric/Behavioral:  Negative for agitation. The patient is not nervous/anxious.    All other systems reviewed and are negative.    Objective:      Physical Exam  Vitals reviewed.   Constitutional:       General: He is not in acute distress.     Appearance: Normal appearance.   HENT:      Right Ear: Decreased hearing noted.      Left Ear: Decreased hearing noted.   Cardiovascular:      Rate and Rhythm: Normal rate and regular rhythm.      Pulses: Normal pulses.   Pulmonary:      Effort: No respiratory distress.   Musculoskeletal:         General: No swelling.      Right lower leg: Edema present.      Left lower leg: Edema present.   Skin:     General: Skin is warm and dry.      Findings: Wound present. No erythema.          Neurological:      General: No focal  deficit present.      Mental Status: He is alert and oriented to person, place, and time.   Psychiatric:         Mood and Affect: Mood normal.         Behavior: Behavior normal.         Thought Content: Thought content normal.         Judgment: Judgment normal.       Assessment:       1. Ulcer of left lower extremity with fat layer exposed    2. Edema of both lower extremities    3. Acute diastolic heart failure    4. Venous stasis dermatitis of both lower extremities           Altered Skin Integrity Left lower;medial Leg (Active)       Altered Skin Integrity Present on Admission:    Side: Left   Orientation: lower;medial   Location: Leg   Wound Number:    Is this injury device related?:    Primary Wound Type:    Description of Altered Skin Integrity:    Ankle-Brachial Index:    Pulses:    Removal Indication and Assessment:    Wound Outcome:    (Retired) Wound Length (cm):    (Retired) Wound Width (cm):    (Retired) Depth (cm):    Wound Description (Comments):    Removal Indications:    Wound Image   09/15/22 0903   Dressing Appearance Dry;Intact;Clean;Moist drainage 09/15/22 0903   Drainage Amount Moderate 09/15/22 0903   Drainage Characteristics/Odor Serous 09/15/22 0903   Red (%), Wound Tissue Color 100 % 09/15/22 0903   Periwound Area Intact;Macerated;Edematous 09/15/22 0903   Wound Length (cm) 4.3 cm 09/15/22 0903   Wound Width (cm) 6 cm 09/15/22 0903   Wound Depth (cm) 0 cm 09/15/22 0903   Wound Volume (cm^3) 0 cm^3 09/15/22 0903   Wound Surface Area (cm^2) 25.8 cm^2 09/15/22 0903   Care Cleansed with:;Soap and water;Wound cleanser 09/15/22 0903   Dressing Applied;Calcium alginate;Island/border 09/15/22 0903       Claude was seen in the clinic room and placed in the supine position on the treatment table.  The wound was noted to be open to air.The leg was cleansed with Easi-clense sponges and dried thoroughly. Removed dried drainage with long qtip. Pt tolerated well.     Plan of Care: Aquacel border dressing  to cover the wound.     Plan:     Left lower extremity dressed as detailed above.   Patient was instructed to not get the dressings wet and to use cast covers for showering.  Should the dressing become wet, he is to remove it, place another aquacel border dressing to the area. He should then notify this office as soon as possible to have a new dressing applied.    Discussed PVD and PAD with patient. Patient has history of severe stenosis of right popliteal artery and mild stenosis of the proximal left SFA. Patient's son states that Dr. Lewis instructed patient to not utilize compression for left lower extremity. Will consider utilizing compression after toe pressure study, arterial ultrasound, and venous reflux study.  Instructed patient to minimize ambulation, elevate legs whenever sedentary, and follow a low sodium diet.       Discussed nutrition and the role of protein in wound healing with the patient. Instructed patient to eat 5 small meals of protein a day. Gave a handout illustrating healthy protein options. Pt voiced understanding.    Discussed stages of wound healing with patient and conditions that contribute to delayed wound healing.    Instructed patient to continue taking doxy and bumetanide as prescribed.     Written and verbal instructions given to patient.  RTC on Monday after venous ultrasound will place unna wrap at that time    Aneta Olmstead PA-C

## 2022-09-16 ENCOUNTER — HOSPITAL ENCOUNTER (OUTPATIENT)
Dept: CARDIOLOGY | Facility: HOSPITAL | Age: 87
Discharge: HOME | End: 2022-09-16
Attending: INTERNAL MEDICINE
Payer: MEDICARE

## 2022-09-16 DIAGNOSIS — L97.922 ULCER OF LEFT LOWER EXTREMITY WITH FAT LAYER EXPOSED: ICD-10-CM

## 2022-09-16 LAB
LEFT ANT TIBIAL SYS PSV: 531 CM/S
LEFT ARM BP: 164 MMHG
LEFT CFA PSV: 99 CM/S
LEFT EXTERNAL ILIAC PSV: 116 CM/S
LEFT PERONEAL SYS PSV: 27 CM/S
LEFT POPLITEAL PSV: 58 CM/S
LEFT PROFUNDA SYS PSV: 90 CM/S
LEFT SUPER FEMORAL DIST SYS PSV: 115 CM/S
LEFT SUPER FEMORAL MID SYS PSV: 143 CM/S
LEFT SUPER FEMORAL OSTIAL SYS PSV: 48 CM/S
LEFT SUPER FEMORAL PROX SYS PSV: 126 CM/S
LEFT TBI: 0.14
LEFT TIB/PER TRUNK SYS PSV: 183 CM/S
LEFT TOE PRESSURE: 23 MMHG
OHS CV LEFT COMMON ILIAC ARTERY PSV: 110 CM/S
OHS CV US RIGHT COMMON ILIAC PSV: 120 CM/S
RIGHT ANT TIBIAL SYS PSV: 493 CM/S
RIGHT ARM BP: 161 MMHG
RIGHT CFA PSV: 109 CM/S
RIGHT EXTERNAL ILLIAC PSV: 104 CM/S
RIGHT POPLITEAL PSV: 65 CM/S
RIGHT PROFUNDA SYS PSV: 139 CM/S
RIGHT SUPER FEMORAL DIST SYS PSV: 147 CM/S
RIGHT SUPER FEMORAL MID SYS PSV: 111 CM/S
RIGHT SUPER FEMORAL OSTIAL SYS PSV: 70 CM/S
RIGHT SUPER FEMORAL PROX SYS PSV: 92 CM/S
RIGHT TBI: 0.35
RIGHT TIB/PER TRUNK SYS PSV: 160 CM/S
RIGHT TOE PRESSURE: 57 MMHG

## 2022-09-16 PROCEDURE — 93925 LOWER EXTREMITY STUDY: CPT | Mod: 26,,, | Performed by: INTERNAL MEDICINE

## 2022-09-16 PROCEDURE — 93998 UNLISTD NONINVAS VASC DX STD: CPT

## 2022-09-16 PROCEDURE — 93998 CV US TOE PRESSURES ONLY (CUPID ONLY): ICD-10-PCS | Mod: ,,, | Performed by: INTERNAL MEDICINE

## 2022-09-16 PROCEDURE — 93925 CV US DOPPLER ARTERIAL LEGS BILATERAL (CUPID ONLY): ICD-10-PCS | Mod: 26,,, | Performed by: INTERNAL MEDICINE

## 2022-09-16 PROCEDURE — 93998 UNLISTD NONINVAS VASC DX STD: CPT | Mod: ,,, | Performed by: INTERNAL MEDICINE

## 2022-09-16 PROCEDURE — 93925 LOWER EXTREMITY STUDY: CPT

## 2022-09-19 ENCOUNTER — HOSPITAL ENCOUNTER (OUTPATIENT)
Dept: CARDIOLOGY | Facility: HOSPITAL | Age: 87
Discharge: HOME | End: 2022-09-19
Attending: INTERNAL MEDICINE
Payer: MEDICARE

## 2022-09-19 ENCOUNTER — OFFICE VISIT (OUTPATIENT)
Dept: WOUND CARE | Facility: CLINIC | Age: 87
End: 2022-09-19
Payer: MEDICARE

## 2022-09-19 DIAGNOSIS — I50.31 ACUTE DIASTOLIC HEART FAILURE: ICD-10-CM

## 2022-09-19 DIAGNOSIS — R60.0 EDEMA OF BOTH LOWER EXTREMITIES: ICD-10-CM

## 2022-09-19 DIAGNOSIS — L97.922 ULCER OF LEFT LOWER EXTREMITY WITH FAT LAYER EXPOSED: ICD-10-CM

## 2022-09-19 DIAGNOSIS — L97.922 ULCER OF LEFT LOWER EXTREMITY WITH FAT LAYER EXPOSED: Primary | ICD-10-CM

## 2022-09-19 DIAGNOSIS — I73.9 PAD (PERIPHERAL ARTERY DISEASE): ICD-10-CM

## 2022-09-19 DIAGNOSIS — R60.0 LOCALIZED EDEMA: ICD-10-CM

## 2022-09-19 DIAGNOSIS — I10 HYPERTENSION, UNSPECIFIED TYPE: ICD-10-CM

## 2022-09-19 DIAGNOSIS — I87.2 VENOUS STASIS DERMATITIS OF BOTH LOWER EXTREMITIES: ICD-10-CM

## 2022-09-19 LAB
LEFT GIAC DIA: 0.23 MM
LEFT GREAT SAPHENOUS DISTAL THIGH DIA: 0.2 CM
LEFT GREAT SAPHENOUS DISTAL THIGH REFLUX: 3676 MS
LEFT GREAT SAPHENOUS JUNCTION DIA: 0.48 CM
LEFT GREAT SAPHENOUS JUNCTION REFLUX: 679 MS
LEFT GREAT SAPHENOUS KNEE DIA: 0.22 CM
LEFT GREAT SAPHENOUS KNEE REFLUX: 4629 MS
LEFT GREAT SAPHENOUS MIDDLE THIGH DIA: 0.28 CM
LEFT GREAT SAPHENOUS MIDDLE THIGH REFLUX: 4104 MS
LEFT GREAT SAPHENOUS PROXIMAL CALF DIA: 0.17 CM
LEFT GREAT SAPHENOUS PROXIMAL CALF REFLUX: 3676 MS
LEFT SMALL SAPHENOUS KNEE DIA: 0.23 CM
LEFT SMALL SAPHENOUS SPJ DIA: 0.24 CM
RIGHT GREAT SAPHENOUS DISTAL THIGH DIA: 0.28 CM
RIGHT GREAT SAPHENOUS DISTAL THIGH REFLUX: 5240 MS
RIGHT GREAT SAPHENOUS JUNCTION DIA: 0.71 CM
RIGHT GREAT SAPHENOUS KNEE DIA: 0.25 CM
RIGHT GREAT SAPHENOUS KNEE REFLUX: 2882 MS
RIGHT GREAT SAPHENOUS MIDDLE THIGH DIA: 0.32 CM
RIGHT GREAT SAPHENOUS MIDDLE THIGH REFLUX: 4778 MS
RIGHT GREAT SAPHENOUS PROXIMAL CALF DIA: 0.18 CM

## 2022-09-19 PROCEDURE — 1101F PR PT FALLS ASSESS DOC 0-1 FALLS W/OUT INJ PAST YR: ICD-10-PCS | Mod: CPTII,S$GLB,,

## 2022-09-19 PROCEDURE — 1159F PR MEDICATION LIST DOCUMENTED IN MEDICAL RECORD: ICD-10-PCS | Mod: CPTII,S$GLB,,

## 2022-09-19 PROCEDURE — 99499 RISK ADDL DX/OHS AUDIT: ICD-10-PCS | Mod: S$GLB,,,

## 2022-09-19 PROCEDURE — 99999 PR PBB SHADOW E&M-EST. PATIENT-LVL II: CPT | Mod: PBBFAC,,,

## 2022-09-19 PROCEDURE — 93970 EXTREMITY STUDY: CPT | Mod: 26,,, | Performed by: INTERNAL MEDICINE

## 2022-09-19 PROCEDURE — 93970 CV US LOWER VENOUS INSUFFICIENCY BILATERAL (CUPID ONLY): ICD-10-PCS | Mod: 26,,, | Performed by: INTERNAL MEDICINE

## 2022-09-19 PROCEDURE — 3288F PR FALLS RISK ASSESSMENT DOCUMENTED: ICD-10-PCS | Mod: CPTII,S$GLB,,

## 2022-09-19 PROCEDURE — 99999 PR PBB SHADOW E&M-EST. PATIENT-LVL II: ICD-10-PCS | Mod: PBBFAC,,,

## 2022-09-19 PROCEDURE — 93970 EXTREMITY STUDY: CPT | Mod: TC

## 2022-09-19 PROCEDURE — 3288F FALL RISK ASSESSMENT DOCD: CPT | Mod: CPTII,S$GLB,,

## 2022-09-19 PROCEDURE — 99215 PR OFFICE/OUTPT VISIT, EST, LEVL V, 40-54 MIN: ICD-10-PCS | Mod: S$GLB,,,

## 2022-09-19 PROCEDURE — 99499 UNLISTED E&M SERVICE: CPT | Mod: S$GLB,,,

## 2022-09-19 PROCEDURE — 1159F MED LIST DOCD IN RCRD: CPT | Mod: CPTII,S$GLB,,

## 2022-09-19 PROCEDURE — 99215 OFFICE O/P EST HI 40 MIN: CPT | Mod: S$GLB,,,

## 2022-09-19 PROCEDURE — 1101F PT FALLS ASSESS-DOCD LE1/YR: CPT | Mod: CPTII,S$GLB,,

## 2022-09-19 NOTE — PROGRESS NOTES
Subjective:       Patient ID: Claude T Dupuis is a 89 y.o. 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 Patient followed with Dr. Lewis on 9/7/22. He was prescribed doxy for 14 days and bumetanide. Pt reports compliance with medication. Dr. Lewis ordered BLE venous reflux study, toe pressure study, and an arterial ultrasound. Denies fever, chills, erythema, warmth, or purulent drainage.      9/7/22 CV Toe Pressures:   · Right TBI 0.35, suggestive of severe right lower extremity arterial disease.  · Left TBI 0.14, is suggestive of severe left lower extremity arterial disease.  · Digit waveforms are mildly dampened on the left.    9/7/22 Ultrasound Doppler Arterial:  · There is scattered atherosclerotic plaque throughout the bilateral lower extremity arteries.  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  · Right anterior tibial artery stenosis, greater than 75%.  · Right posterior tibial artery is occluded.  · Right peroneal artery not visualized, possibly occluded.  · Left tibio peroneal trunk artery stenosis, 50-75%.  · Left anterior tibial artery stenosis, greater than 75%.  · Left posterior tibial artery is occluded.    Review of Systems   Constitutional:  Negative for activity change, chills, diaphoresis, fatigue and fever.   Respiratory:  Negative for apnea, chest tightness and shortness of breath.    Cardiovascular:  Positive for leg swelling. Negative for chest pain and palpitations.   Musculoskeletal:  Negative for gait problem and joint swelling.   Skin:  Positive for wound. Negative for pallor and rash.   Neurological:  Negative for syncope, weakness and numbness.   Psychiatric/Behavioral:  Negative for agitation. The patient  is not nervous/anxious.    All other systems reviewed and are negative.    Objective:      Physical Exam  Vitals reviewed.   Constitutional:       General: He is not in acute distress.     Appearance: Normal appearance.   HENT:      Right Ear: Decreased hearing noted.      Left Ear: Decreased hearing noted.   Cardiovascular:      Rate and Rhythm: Normal rate and regular rhythm.   Pulmonary:      Effort: No respiratory distress.   Musculoskeletal:         General: No swelling.      Right lower leg: Edema present.      Left lower leg: Edema present.   Skin:     General: Skin is warm and dry.      Findings: Wound present. No erythema.          Neurological:      General: No focal deficit present.      Mental Status: He is alert and oriented to person, place, and time.   Psychiatric:         Mood and Affect: Mood normal.         Behavior: Behavior normal.         Thought Content: Thought content normal.         Judgment: Judgment normal.       Assessment:       1. Ulcer of left lower extremity with fat layer exposed    2. Edema of both lower extremities    3. PAD (peripheral artery disease)    4. Acute diastolic heart failure    5. Venous stasis dermatitis of both lower extremities    6. Hypertension, unspecified type           Altered Skin Integrity Left lower;medial Leg (Active)       Altered Skin Integrity Present on Admission:    Side: Left   Orientation: lower;medial   Location: Leg   Wound Number:    Is this injury device related?:    Primary Wound Type:    Description of Altered Skin Integrity:    Ankle-Brachial Index:    Pulses:    Removal Indication and Assessment:    Wound Outcome:    (Retired) Wound Length (cm):    (Retired) Wound Width (cm):    (Retired) Depth (cm):    Wound Description (Comments):    Removal Indications:    Wound Image   09/19/22 1618   Dressing Appearance Dry;Intact;Clean;Moist drainage 09/19/22 1618   Drainage Amount Moderate 09/19/22 1618   Drainage Characteristics/Odor Serous 09/19/22  1618   Red (%), Wound Tissue Color 100 % 09/19/22 1618   Periwound Area Intact;Edematous;Macerated 09/19/22 1618   Wound Length (cm) 4.4 cm 09/19/22 1618   Wound Width (cm) 5.3 cm 09/19/22 1618   Wound Depth (cm) 0 cm 09/19/22 1618   Wound Volume (cm^3) 0 cm^3 09/19/22 1618   Wound Surface Area (cm^2) 23.32 cm^2 09/19/22 1618   Care Cleansed with:;Soap and water;Wound cleanser 09/19/22 1618   Dressing Applied;Calcium alginate;Island/border 09/19/22 1618       Claude was seen in the clinic room and placed in the supine position on the treatment table.  The dressing was removed.The leg was cleansed with Easi-clense sponges and dried thoroughly.     Plan of Care: Aquacel border dressing to cover the wound.     Plan:     Left lower extremity dressed as detailed above.   Patient was instructed to not get the dressings wet and to use cast covers for showering.  Should the dressing become wet, he is to remove it, place another aquacel border dressing to the area. He should then notify this office as soon as possible to have a new dressing applied.    Discussed PVD and PAD with patient. Patient completed venous ultrasound prior to this appointment. Imaging not resulted yet. Discussed toe pressure and arterial ultrasound results with patient and patient's son. Unable to utilize therapeutic compression due to severe bilateral lower extremity arterial disease. Instructed patient to keep follow up appointment with Dr. Lewis to further discuss imaging results.  Instructed patient to minimize ambulation, elevate legs whenever sedentary, and follow a low sodium diet.     Discussed venous stasis dermatitis with patient and treatment options. Discussed referral to dermatology for treatment. Pt declined.       Discussed nutrition and the role of protein in wound healing with the patient. Instructed patient to eat 5 small meals of protein a day. Pt voiced understanding.    Instructed patient to continue taking doxy and bumetanide as  prescribed.     Written and verbal instructions given to patient.  RTC in 1 week    Aneta Olmstead PA-C

## 2022-09-26 ENCOUNTER — TELEPHONE (OUTPATIENT)
Dept: WOUND CARE | Facility: CLINIC | Age: 87
End: 2022-09-26
Payer: MEDICARE

## 2022-09-26 NOTE — TELEPHONE ENCOUNTER
----- Message from Michelle Adrian sent at 9/26/2022  8:10 AM CDT -----  Regarding: appt  Contact: Anton (son)@ [phone]  Caller asking to speak with juancho in Dr. Olmstead's office regarding rescheduling patient's appt. Please call.

## 2022-09-27 ENCOUNTER — OFFICE VISIT (OUTPATIENT)
Dept: WOUND CARE | Facility: CLINIC | Age: 87
End: 2022-09-27
Payer: MEDICARE

## 2022-09-27 VITALS
WEIGHT: 180.75 LBS | HEART RATE: 66 BPM | TEMPERATURE: 99 F | SYSTOLIC BLOOD PRESSURE: 160 MMHG | BODY MASS INDEX: 28.37 KG/M2 | HEIGHT: 67 IN | DIASTOLIC BLOOD PRESSURE: 71 MMHG

## 2022-09-27 DIAGNOSIS — I73.9 PAD (PERIPHERAL ARTERY DISEASE): ICD-10-CM

## 2022-09-27 DIAGNOSIS — R60.0 EDEMA OF BOTH LOWER EXTREMITIES: ICD-10-CM

## 2022-09-27 DIAGNOSIS — I50.31 ACUTE DIASTOLIC HEART FAILURE: ICD-10-CM

## 2022-09-27 DIAGNOSIS — I87.2 VENOUS STASIS DERMATITIS OF BOTH LOWER EXTREMITIES: ICD-10-CM

## 2022-09-27 DIAGNOSIS — I10 HYPERTENSION, UNSPECIFIED TYPE: ICD-10-CM

## 2022-09-27 DIAGNOSIS — L97.922 ULCER OF LEFT LOWER EXTREMITY WITH FAT LAYER EXPOSED: Primary | ICD-10-CM

## 2022-09-27 PROCEDURE — 99499 RISK ADDL DX/OHS AUDIT: ICD-10-PCS | Mod: S$GLB,,,

## 2022-09-27 PROCEDURE — 1159F MED LIST DOCD IN RCRD: CPT | Mod: CPTII,S$GLB,,

## 2022-09-27 PROCEDURE — 3288F FALL RISK ASSESSMENT DOCD: CPT | Mod: CPTII,S$GLB,,

## 2022-09-27 PROCEDURE — 3288F PR FALLS RISK ASSESSMENT DOCUMENTED: ICD-10-PCS | Mod: CPTII,S$GLB,,

## 2022-09-27 PROCEDURE — 99499 UNLISTED E&M SERVICE: CPT | Mod: S$GLB,,,

## 2022-09-27 PROCEDURE — 99999 PR PBB SHADOW E&M-EST. PATIENT-LVL III: CPT | Mod: PBBFAC,,,

## 2022-09-27 PROCEDURE — 99999 PR PBB SHADOW E&M-EST. PATIENT-LVL III: ICD-10-PCS | Mod: PBBFAC,,,

## 2022-09-27 PROCEDURE — 1101F PT FALLS ASSESS-DOCD LE1/YR: CPT | Mod: CPTII,S$GLB,,

## 2022-09-27 PROCEDURE — 99214 PR OFFICE/OUTPT VISIT, EST, LEVL IV, 30-39 MIN: ICD-10-PCS | Mod: S$GLB,,,

## 2022-09-27 PROCEDURE — 99214 OFFICE O/P EST MOD 30 MIN: CPT | Mod: S$GLB,,,

## 2022-09-27 PROCEDURE — 1125F AMNT PAIN NOTED PAIN PRSNT: CPT | Mod: CPTII,S$GLB,,

## 2022-09-27 PROCEDURE — 1125F PR PAIN SEVERITY QUANTIFIED, PAIN PRESENT: ICD-10-PCS | Mod: CPTII,S$GLB,,

## 2022-09-27 PROCEDURE — 1159F PR MEDICATION LIST DOCUMENTED IN MEDICAL RECORD: ICD-10-PCS | Mod: CPTII,S$GLB,,

## 2022-09-27 PROCEDURE — 1101F PR PT FALLS ASSESS DOC 0-1 FALLS W/OUT INJ PAST YR: ICD-10-PCS | Mod: CPTII,S$GLB,,

## 2022-09-27 NOTE — PROGRESS NOTES
Subjective:       Patient ID: Claude T Dupuis is a 89 y.o. 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 Patient follows with Dr. Lewis for PVD and PAD. No complaints at this time. Denies fever, chills, erythema, warmth, or purulent drainage.        9/7/22 CV Toe Pressures:   · Right TBI 0.35, suggestive of severe right lower extremity arterial disease.  · Left TBI 0.14, is suggestive of severe left lower extremity arterial disease.  · Digit waveforms are mildly dampened on the left.    9/7/22 Ultrasound Doppler Arterial:  · There is scattered atherosclerotic plaque throughout the bilateral lower extremity arteries.  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  · Right anterior tibial artery stenosis, greater than 75%.  · Right posterior tibial artery is occluded.  · Right peroneal artery not visualized, possibly occluded.  · Left tibio peroneal trunk artery stenosis, 50-75%.  · Left anterior tibial artery stenosis, greater than 75%.  · Left posterior tibial artery is occluded.    Review of Systems   Constitutional:  Negative for activity change, chills, diaphoresis, fatigue and fever.   Respiratory:  Negative for apnea, chest tightness and shortness of breath.    Cardiovascular:  Positive for leg swelling. Negative for chest pain and palpitations.   Musculoskeletal:  Negative for gait problem and joint swelling.   Skin:  Positive for wound. Negative for pallor and rash.   Neurological:  Negative for syncope, weakness and numbness.   Psychiatric/Behavioral:  Negative for agitation. The patient is not nervous/anxious.    All other systems reviewed and are negative.    Objective:       Physical Exam  Vitals reviewed.   Constitutional:       General: He is not in acute distress.     Appearance: Normal appearance.   HENT:      Right Ear: Decreased hearing noted.      Left Ear: Decreased hearing noted.   Cardiovascular:      Rate and Rhythm: Normal rate and regular rhythm.   Pulmonary:      Effort: No respiratory distress.   Musculoskeletal:         General: No swelling.      Right lower leg: Edema present.      Left lower leg: Edema present.   Skin:     General: Skin is warm and dry.      Findings: Wound present. No erythema.          Neurological:      General: No focal deficit present.      Mental Status: He is alert and oriented to person, place, and time.   Psychiatric:         Mood and Affect: Mood normal.         Behavior: Behavior normal.         Thought Content: Thought content normal.         Judgment: Judgment normal.       Assessment:       1. Ulcer of left lower extremity with fat layer exposed    2. Edema of both lower extremities    3. PAD (peripheral artery disease)    4. Acute diastolic heart failure    5. Venous stasis dermatitis of both lower extremities    6. Hypertension, unspecified type           Altered Skin Integrity Left lower;medial Leg (Active)       Altered Skin Integrity Present on Admission:    Side: Left   Orientation: lower;medial   Location: Leg   Wound Number:    Is this injury device related?:    Primary Wound Type:    Description of Altered Skin Integrity:    Ankle-Brachial Index:    Pulses:    Removal Indication and Assessment:    Wound Outcome:    (Retired) Wound Length (cm):    (Retired) Wound Width (cm):    (Retired) Depth (cm):    Wound Description (Comments):    Removal Indications:    Wound Image   09/27/22 1039   Dressing Appearance Dry;Intact;Clean;Moist drainage 09/27/22 1039   Drainage Amount Moderate 09/27/22 1039   Drainage Characteristics/Odor Serous 09/27/22 1039   Red (%), Wound Tissue Color 100 % 09/27/22 1039   Periwound Area  Intact;Pink;Edematous;Hemosiderin Staining 09/27/22 1039   Wound Length (cm) 4 cm 09/27/22 1039   Wound Width (cm) 3.2 cm 09/27/22 1039   Wound Depth (cm) 0 cm 09/27/22 1039   Wound Volume (cm^3) 0 cm^3 09/27/22 1039   Wound Surface Area (cm^2) 12.8 cm^2 09/27/22 1039   Care Cleansed with:;Soap and water;Wound cleanser 09/27/22 1039   Dressing Applied;Calcium alginate;Island/border 09/27/22 1039       Claude was seen in the clinic room and placed in the supine position on the treatment table.  The dressing was removed.The leg was cleansed with Easi-clense sponges and dried thoroughly.     Plan of Care: Aquacel border dressing to cover the wound.     Plan:     Left lower extremity dressed as detailed above.   Patient was instructed to not get the dressings wet and to use cast covers for showering.  Should the dressing become wet, he is to remove it, place another aquacel border dressing to the area. He should then notify this office as soon as possible to have a new dressing applied.    Discussed PVD and PAD with patient. Discussed toe pressure, venous ultrasound, and arterial ultrasound results with patient and patient's son. Unable to utilize therapeutic compression due to severe bilateral lower extremity arterial disease. Instructed patient to keep follow up appointment with Dr. Lewis to further discuss imaging results.  Instructed patient to minimize ambulation, elevate legs whenever sedentary, and follow a low sodium diet.     Discussed venous stasis dermatitis with patient and treatment options. Discussed referral to dermatology for treatment. Pt declined.       Discussed nutrition and the role of protein in wound healing with the patient. Instructed patient to eat 5 small meals of protein a day. Pt voiced understanding.    Instructed patient to continue taking bumetanide as prescribed.     Written and verbal instructions given to patient.  RTC in 1 week    Aneta Olmstead PA-C

## 2022-09-28 ENCOUNTER — OFFICE VISIT (OUTPATIENT)
Dept: CARDIOLOGY | Facility: CLINIC | Age: 87
End: 2022-09-28
Payer: MEDICARE

## 2022-09-28 VITALS
SYSTOLIC BLOOD PRESSURE: 122 MMHG | DIASTOLIC BLOOD PRESSURE: 62 MMHG | WEIGHT: 182.56 LBS | HEIGHT: 67 IN | HEART RATE: 56 BPM | BODY MASS INDEX: 28.65 KG/M2

## 2022-09-28 DIAGNOSIS — I70.91 GENERALIZED ATHEROSCLEROSIS: ICD-10-CM

## 2022-09-28 DIAGNOSIS — I10 PRIMARY HYPERTENSION: ICD-10-CM

## 2022-09-28 DIAGNOSIS — I73.9 PAD (PERIPHERAL ARTERY DISEASE): ICD-10-CM

## 2022-09-28 DIAGNOSIS — R60.0 EDEMA OF BOTH LOWER EXTREMITIES: ICD-10-CM

## 2022-09-28 DIAGNOSIS — Z95.1 HX OF CABG: ICD-10-CM

## 2022-09-28 DIAGNOSIS — I50.31 ACUTE DIASTOLIC HEART FAILURE: ICD-10-CM

## 2022-09-28 DIAGNOSIS — L97.922 ULCER OF LEFT LOWER EXTREMITY WITH FAT LAYER EXPOSED: Primary | ICD-10-CM

## 2022-09-28 DIAGNOSIS — M79.605 LEFT LEG PAIN: ICD-10-CM

## 2022-09-28 DIAGNOSIS — I87.2 VENOUS STASIS DERMATITIS OF BOTH LOWER EXTREMITIES: ICD-10-CM

## 2022-09-28 PROCEDURE — 99499 RISK ADDL DX/OHS AUDIT: ICD-10-PCS | Mod: S$GLB,,, | Performed by: INTERNAL MEDICINE

## 2022-09-28 PROCEDURE — 99215 PR OFFICE/OUTPT VISIT, EST, LEVL V, 40-54 MIN: ICD-10-PCS | Mod: S$GLB,,, | Performed by: INTERNAL MEDICINE

## 2022-09-28 PROCEDURE — 99499 UNLISTED E&M SERVICE: CPT | Mod: S$GLB,,, | Performed by: INTERNAL MEDICINE

## 2022-09-28 PROCEDURE — 1126F AMNT PAIN NOTED NONE PRSNT: CPT | Mod: CPTII,S$GLB,, | Performed by: INTERNAL MEDICINE

## 2022-09-28 PROCEDURE — 99999 PR PBB SHADOW E&M-EST. PATIENT-LVL III: ICD-10-PCS | Mod: PBBFAC,,, | Performed by: INTERNAL MEDICINE

## 2022-09-28 PROCEDURE — 1159F PR MEDICATION LIST DOCUMENTED IN MEDICAL RECORD: ICD-10-PCS | Mod: CPTII,S$GLB,, | Performed by: INTERNAL MEDICINE

## 2022-09-28 PROCEDURE — 99215 OFFICE O/P EST HI 40 MIN: CPT | Mod: S$GLB,,, | Performed by: INTERNAL MEDICINE

## 2022-09-28 PROCEDURE — 3288F PR FALLS RISK ASSESSMENT DOCUMENTED: ICD-10-PCS | Mod: CPTII,S$GLB,, | Performed by: INTERNAL MEDICINE

## 2022-09-28 PROCEDURE — 1101F PT FALLS ASSESS-DOCD LE1/YR: CPT | Mod: CPTII,S$GLB,, | Performed by: INTERNAL MEDICINE

## 2022-09-28 PROCEDURE — 1126F PR PAIN SEVERITY QUANTIFIED, NO PAIN PRESENT: ICD-10-PCS | Mod: CPTII,S$GLB,, | Performed by: INTERNAL MEDICINE

## 2022-09-28 PROCEDURE — 99999 PR PBB SHADOW E&M-EST. PATIENT-LVL III: CPT | Mod: PBBFAC,,, | Performed by: INTERNAL MEDICINE

## 2022-09-28 PROCEDURE — 1159F MED LIST DOCD IN RCRD: CPT | Mod: CPTII,S$GLB,, | Performed by: INTERNAL MEDICINE

## 2022-09-28 PROCEDURE — 1101F PR PT FALLS ASSESS DOC 0-1 FALLS W/OUT INJ PAST YR: ICD-10-PCS | Mod: CPTII,S$GLB,, | Performed by: INTERNAL MEDICINE

## 2022-09-28 PROCEDURE — 3288F FALL RISK ASSESSMENT DOCD: CPT | Mod: CPTII,S$GLB,, | Performed by: INTERNAL MEDICINE

## 2022-09-28 NOTE — PROGRESS NOTES
"[facility]      Chief Complaint   Patient presents with    left lower extremitty ulcer         Patient ID: Claude T Dupuis is a 89 y.o. male with PAD, CAD s/p CABG (2007), HTN, colon cancer s/p cancer, Guillain [place] Syndrome, who presents for a follow up appointment.  Pertinent history/events are as follows:     -Pt presents for evaluation of left leg wound/ulcer.      -At our initial clinic visit on 9/7/2022: Mr. Cox is accompanied by his son.  He reports 6 weeks ago he "burned his left leg with hot water while getting into the shower.  States a large blister formed at the left lower leg.  He notes pain and burning sensation at the left lower leg.  States he went to the ED at [facility] 10 days after the injury and they "popped the blister and gave me antibiotics".   Plan:   LLE Wound/Ulcer- Likely due to burn with possible underlying venous insufficiency.  Check BLE venous reflux study, toe pressure study and arterial ultrasound.  Refer to wound care.  Treat with doxycycline 100 mg bid for 14 days.  Start bumex 0.5 mg bid.  Check cmp today and in 1 week.  Elevate legs when resting.  Pt to use Tylenol for pain.   CAD s/p CABD- No angina currently.  Continue current medications.  HTN- Controlled.  Continue current medications.     HPI:  Mr. Cox reports feeling much better.  He has no chest pain or SOB.  Review of wound care images shows the LLE wound is healing slowly.  Toe Pressure Study on 9/16/2022 demonstrated Left Toe Pressure 23 mmHg; Right Toe Pressure 57 mmHg.  BLE Arterial Ultrasound on 9/16/2022 revealed scattered atherosclerotic plaque throughout the bilateral lower extremity arteries.  Right tibio peroneal trunk artery is occluded in the distal segment.  Right anterior tibial artery stenosis, greater than 75%.  Right posterior tibial artery is occluded.  Left tibio peroneal trunk artery stenosis, 50-75%.  Left anterior tibial artery stenosis, greater than 75%. Left posterior " tibial artery is occluded.  BLE Venous Reflux Study on 9/19/2022 revealed bilateral GSV reflux with no DVT.  The right smaller saphenous vein is noncompressible, consistent with superficial venous thrombosis (SVT).    Past Medical History:   Diagnosis Date    Cancer     Colon cancer     Coronary artery disease     Hypertension      Past Surgical History:   Procedure Laterality Date    VEIN BYPASS SURGERY       Social History     Socioeconomic History    Marital status:    Tobacco Use    Smoking status: Never    Smokeless tobacco: Never   Substance and Sexual Activity    Alcohol use: No    Drug use: No    Sexual activity: Never     Family History   Problem Relation Age of Onset    Diabetes Mother        Review of patient's allergies indicates:  No Known Allergies    Medication List with Changes/Refills   Current Medications    ASPIRIN 325 MG TABLET    Take 325 mg by mouth once daily.    BUMETANIDE (BUMEX) 0.5 MG TAB    Take 1 tablet (0.5 mg total) by mouth 2 (two) times a day.    ISOSORBIDE MONONITRATE (IMDUR) 30 MG 24 HR TABLET    once daily.    LEVOTHYROXINE (SYNTHROID) 100 MCG TABLET    Take 100 mcg by mouth once daily.    LISINOPRIL (PRINIVIL,ZESTRIL) 20 MG TABLET    Take 20 mg by mouth once daily.    METOPROLOL TARTRATE (LOPRESSOR) 100 MG TABLET    Take 100 mg by mouth 2 (two) times daily.   Discontinued Medications    NAPROXEN (NAPROSYN) 500 MG TABLET    TAKE 1 TABLET BY MOUTH TWICE DAILY WITH MEALS FOR 10 DAYS       Review of Systems  Constitution: Denies chills, fever, and sweats.  HENT: Denies headaches or blurry vision.  Cardiovascular: Denies chest pain or irregular heart beat.  Respiratory: Denies cough or shortness of breath.  Gastrointestinal: Denies abdominal pain, nausea, or vomiting.  Musculoskeletal: Denies muscle cramps.  Neurological: Positive for left leg wound with pain and swelling.  Psychiatric/Behavioral: Normal mental status.  Hematologic/Lymphatic: Denies bleeding problem or easy  "bruising/bleeding.  Skin: Denies rash or suspicious lesions    Physical Examination  /62 (BP Location: Left arm, Patient Position: Sitting)   Pulse (!) 56   Ht 5' 7" (1.702 m)   Wt 82.8 kg (182 lb 8.7 oz)   BMI 28.59 kg/m²     Constitutional: No acute distress, conversant  HEENT: Sclera anicteric, Pupils equal, round and reactive to light, extraocular motions intact, Oropharynx clear  Neck: No JVD, no carotid bruits  Cardiovascular: regular rate and rhythm, no murmur, rubs or gallops, normal S1/S2  Pulmonary: Clear to auscultation bilaterally  Abdominal: Abdomen soft, nontender, nondistended, positive bowel sounds  Extremities: BLE's with 1 pitting edema and venous stasis dermatitis  LLE with 4 x 5.5 cm ulcer/wound at medial aspect near ankle   Pulses:  Carotid pulses are 2+ on the right side, and 2+ on the left side.  Radial pulses are 2+ on the right side, and 2+ on the left side.   Femoral pulses are 2+ on the right side, and 2+ on the left side.  Popliteal pulses are 2+ on the right side, and 2+ on the left side.   Dorsalis pedis pulses are 2+ on the right side, and 2+ on the left side.   Posterior tibial pulses are 2+ on the right side, and 2+ on the left side.    Skin: No ecchymosis, erythema, or ulcers  Psych: Alert and oriented x 3, appropriate affect  Neuro: CNII-XII intact, no focal deficits    Labs:  Most Recent Data  CBC: No results found for: WBC, HGB, HCT, PLT, MCV, RDW  BMP:   Lab Results   Component Value Date     09/16/2022    K 4.3 09/16/2022     09/16/2022    CO2 30 (H) 09/16/2022    BUN 15 09/16/2022    CREATININE 1.1 09/16/2022     (H) 09/16/2022    CALCIUM 9.6 09/16/2022     LFTS;   Lab Results   Component Value Date    PROT 7.4 09/16/2022    ALBUMIN 3.7 09/16/2022    BILITOT 0.8 09/16/2022    AST 20 09/16/2022    ALKPHOS 79 09/16/2022    ALT 12 09/16/2022     COAGS: No results found for: INR, PROTIME, PTT  FLP: No results found for: CHOL, HDL, LDLCALC, TRIG, " CHOLHDL  CARDIAC: No results found for: TROPONINI, CKMB, BNP    Toe Pressure Study 9/16/2022:  Right TBI 0.35, suggestive of severe right lower extremity arterial disease.  Left TBI 0.14, is suggestive of severe left lower extremity arterial disease.  Digit waveforms are mildly dampened on the left.  Left Toe Pressure 23 mmHg; Right Toe Pressure 57 mmHg    BLE Arterial Ultrasound 9/16/2022:  There is scattered atherosclerotic plaque throughout the bilateral lower extremity arteries.  Right tibio peroneal trunk artery is occluded in the distal segment.  Right anterior tibial artery stenosis, greater than 75%.  Right posterior tibial artery is occluded.  Right peroneal artery not visualized, possibly occluded.  Left tibio peroneal trunk artery stenosis, 50-75%.  Left anterior tibial artery stenosis, greater than 75%.  Left posterior tibial artery is occluded.    BLE Venous Reflux Study 9/19/2022  There is no evidence of a right lower extremity DVT.  The right smaller saphenous vein is noncompressible, consistent with superficial venous thrombosis (SVT).  The right greater saphenous vein has reflux.  There is no evidence of a left lower extremity DVT.  The left greater saphenous vein has reflux.    Assessment/Plan:  Claude T Dupuis is a 89 y.o. male with PAD, CAD s/p CABG (2007), HTN, colon cancer s/p cancer, Guillain [place] Syndrome, who presents for a follow up appointment.    1. PAD with LLE Wound/Ulcer- Likely due to burn with possible underlying venous insufficiency.  Toe Pressure Study on 9/16/2022 demonstrated Left Toe Pressure 23 mmHg; Right Toe Pressure 57 mmHg.  BLE Arterial Ultrasound on 9/16/2022 revealed scattered atherosclerotic plaque throughout the bilateral lower extremity arteries.  Right tibio peroneal trunk artery is occluded in the distal segment.  Right anterior tibial artery stenosis, greater than 75%.  Right posterior tibial artery is occluded.  Left tibio peroneal trunk artery stenosis, 50-75%.   Left anterior tibial artery stenosis, greater than 75%. Left posterior tibial artery is occluded.  Continue wound care.  Treat with doxycycline 100 mg bid for 14 days.  Continue bumex 0.5 mg bid.  Elevate legs when resting.  Continue to use Tylenol for pain.  Given significant LLE PAD, will refer for evaluation for possible LLE angio/intervention with Dr. Kothari (in the event the wound does not continue to heal appropriately).  Continue to monitor.     2. RLE Superficial Venous Thrombosis- BLE Venous Reflux Study on 9/19/2022 revealed bilateral GSV reflux with no DVT.  The right smaller saphenous vein is noncompressible, consistent with superficial venous thrombosis (SVT).  Treat with Xarelto 10 mg daily for 45 days.      3. CAD s/p CABD- No angina currently.  Continue current medications.    4. HTN- Controlled.  Continue current medications.      Follow up in 1 month with cmp and lipids prior     Total duration of face to face visit time 30 minutes.  Total time spent counseling greater than fifty percent of total visit time.  Counseling included discussion regarding imaging findings, diagnosis, possibilities, treatment options, risks and benefits.  The patient had many questions regarding the options and long-term effects.    Jose Antonio Lewis MD, PhD  Interventional Cardiology

## 2022-09-28 NOTE — PATIENT INSTRUCTIONS
Assessment/Plan:  Claude T Dupuis is a 89 y.o. male with PAD, CAD s/p CABG (2007), HTN, colon cancer s/p cancer, Guillain [place] Syndrome, who presents for a follow up appointment.    1. LLE Wound/Ulcer- Likely due to burn with possible underlying venous insufficiency.  Toe Pressure Study on 9/16/2022 demonstrated Left Toe Pressure 23 mmHg; Right Toe Pressure 57 mmHg.  BLE Arterial Ultrasound on 9/16/2022 revealed scattered atherosclerotic plaque throughout the bilateral lower extremity arteries.  Right tibio peroneal trunk artery is occluded in the distal segment.  Right anterior tibial artery stenosis, greater than 75%.  Right posterior tibial artery is occluded.  Left tibio peroneal trunk artery stenosis, 50-75%.  Left anterior tibial artery stenosis, greater than 75%. Left posterior tibial artery is occluded.  Continue wound care.  Treat with doxycycline 100 mg bid for 14 days.  Continue bumex 0.5 mg bid.  Elevate legs when resting.  Continue to use Tylenol for pain.  Given significant LLE PAD, will refer for evaluation for possible LLE angio/intervention with Dr. Kothari (in the event the wound does not continue to heal appropriately).  Continue to monitor.     2. RLE Superficial Venous Thrombosis- BLE Venous Reflux Study on 9/19/2022 revealed bilateral GSV reflux with no DVT.  The right smaller saphenous vein is noncompressible, consistent with superficial venous thrombosis (SVT).  Treat with Xarelto 10 mg daily for 45 days.      3. CAD s/p CABD- No angina currently.  Continue current medications.    4. HTN- Controlled.  Continue current medications.      Follow up in 1 month with cmp prior

## 2022-10-05 ENCOUNTER — OFFICE VISIT (OUTPATIENT)
Dept: WOUND CARE | Facility: CLINIC | Age: 87
End: 2022-10-05
Payer: MEDICARE

## 2022-10-05 VITALS
SYSTOLIC BLOOD PRESSURE: 152 MMHG | BODY MASS INDEX: 28.37 KG/M2 | DIASTOLIC BLOOD PRESSURE: 67 MMHG | HEIGHT: 67 IN | WEIGHT: 180.75 LBS | TEMPERATURE: 98 F | HEART RATE: 57 BPM

## 2022-10-05 DIAGNOSIS — R60.0 EDEMA OF BOTH LOWER EXTREMITIES: ICD-10-CM

## 2022-10-05 DIAGNOSIS — I73.9 PAD (PERIPHERAL ARTERY DISEASE): ICD-10-CM

## 2022-10-05 DIAGNOSIS — I10 HYPERTENSION, UNSPECIFIED TYPE: ICD-10-CM

## 2022-10-05 DIAGNOSIS — I50.31 ACUTE DIASTOLIC HEART FAILURE: ICD-10-CM

## 2022-10-05 DIAGNOSIS — L97.922 ULCER OF LEFT LOWER EXTREMITY WITH FAT LAYER EXPOSED: Primary | ICD-10-CM

## 2022-10-05 DIAGNOSIS — I87.2 VENOUS STASIS DERMATITIS OF BOTH LOWER EXTREMITIES: ICD-10-CM

## 2022-10-05 PROCEDURE — 1101F PR PT FALLS ASSESS DOC 0-1 FALLS W/OUT INJ PAST YR: ICD-10-PCS | Mod: CPTII,S$GLB,,

## 2022-10-05 PROCEDURE — 1125F PR PAIN SEVERITY QUANTIFIED, PAIN PRESENT: ICD-10-PCS | Mod: CPTII,S$GLB,,

## 2022-10-05 PROCEDURE — 3288F PR FALLS RISK ASSESSMENT DOCUMENTED: ICD-10-PCS | Mod: CPTII,S$GLB,,

## 2022-10-05 PROCEDURE — 1101F PT FALLS ASSESS-DOCD LE1/YR: CPT | Mod: CPTII,S$GLB,,

## 2022-10-05 PROCEDURE — 1125F AMNT PAIN NOTED PAIN PRSNT: CPT | Mod: CPTII,S$GLB,,

## 2022-10-05 PROCEDURE — 1159F PR MEDICATION LIST DOCUMENTED IN MEDICAL RECORD: ICD-10-PCS | Mod: CPTII,S$GLB,,

## 2022-10-05 PROCEDURE — 99499 RISK ADDL DX/OHS AUDIT: ICD-10-PCS | Mod: S$GLB,,,

## 2022-10-05 PROCEDURE — 99999 PR PBB SHADOW E&M-EST. PATIENT-LVL III: CPT | Mod: PBBFAC,,,

## 2022-10-05 PROCEDURE — 1159F MED LIST DOCD IN RCRD: CPT | Mod: CPTII,S$GLB,,

## 2022-10-05 PROCEDURE — 99999 PR PBB SHADOW E&M-EST. PATIENT-LVL III: ICD-10-PCS | Mod: PBBFAC,,,

## 2022-10-05 PROCEDURE — 99214 OFFICE O/P EST MOD 30 MIN: CPT | Mod: S$GLB,,,

## 2022-10-05 PROCEDURE — 99214 PR OFFICE/OUTPT VISIT, EST, LEVL IV, 30-39 MIN: ICD-10-PCS | Mod: S$GLB,,,

## 2022-10-05 PROCEDURE — 99499 UNLISTED E&M SERVICE: CPT | Mod: S$GLB,,,

## 2022-10-05 PROCEDURE — 3288F FALL RISK ASSESSMENT DOCD: CPT | Mod: CPTII,S$GLB,,

## 2022-10-05 NOTE — PROGRESS NOTES
Subjective:       Patient ID: Claude T Dupuis is a 89 y.o. 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 Patient follows with Dr. Lewis. No complaints at this time. Denies fever, chills, erythema, warmth, or purulent drainage.      9/7/22 CV Toe Pressures:   · Right TBI 0.35, suggestive of severe right lower extremity arterial disease.  · Left TBI 0.14, is suggestive of severe left lower extremity arterial disease.  · Digit waveforms are mildly dampened on the left.    9/7/22 Ultrasound Doppler Arterial:  · There is scattered atherosclerotic plaque throughout the bilateral lower extremity arteries.  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  · Right anterior tibial artery stenosis, greater than 75%.  · Right posterior tibial artery is occluded.  · Right peroneal artery not visualized, possibly occluded.  · Left tibio peroneal trunk artery stenosis, 50-75%.  · Left anterior tibial artery stenosis, greater than 75%.  · Left posterior tibial artery is occluded.    Review of Systems   Constitutional:  Negative for activity change, chills, diaphoresis, fatigue and fever.   Respiratory:  Negative for apnea, chest tightness and shortness of breath.    Cardiovascular:  Positive for leg swelling. Negative for chest pain and palpitations.   Musculoskeletal:  Negative for gait problem and joint swelling.   Skin:  Positive for wound. Negative for pallor and rash.   Neurological:  Negative for syncope, weakness and numbness.   Psychiatric/Behavioral:  Negative for agitation. The patient is not nervous/anxious.    All other systems reviewed and are negative.    Objective:      Physical Exam  Vitals reviewed.   Constitutional:       General: He is not  in acute distress.     Appearance: Normal appearance.   HENT:      Right Ear: Decreased hearing noted.      Left Ear: Decreased hearing noted.   Cardiovascular:      Rate and Rhythm: Normal rate and regular rhythm.   Pulmonary:      Effort: No respiratory distress.   Musculoskeletal:         General: No swelling.      Right lower leg: Edema present.      Left lower leg: Edema present.   Skin:     General: Skin is warm and dry.      Findings: Wound present. No erythema.          Neurological:      General: No focal deficit present.      Mental Status: He is alert and oriented to person, place, and time.   Psychiatric:         Mood and Affect: Mood normal.         Behavior: Behavior normal.         Thought Content: Thought content normal.         Judgment: Judgment normal.       Assessment:       1. Ulcer of left lower extremity with fat layer exposed    2. Edema of both lower extremities    3. PAD (peripheral artery disease)    4. Acute diastolic heart failure    5. Venous stasis dermatitis of both lower extremities    6. Hypertension, unspecified type           Altered Skin Integrity Left lower;medial Leg (Active)       Altered Skin Integrity Present on Admission:    Side: Left   Orientation: lower;medial   Location: Leg   Wound Number:    Is this injury device related?:    Primary Wound Type:    Description of Altered Skin Integrity:    Ankle-Brachial Index:    Pulses:    Removal Indication and Assessment:    Wound Outcome:    (Retired) Wound Length (cm):    (Retired) Wound Width (cm):    (Retired) Depth (cm):    Wound Description (Comments):    Removal Indications:    Wound Image   10/05/22 0920   Dressing Appearance Dry;Intact;Clean;Dried drainage 10/05/22 0920   Drainage Amount Small 10/05/22 0920   Drainage Characteristics/Odor [place] 10/05/22 0920   Red (%), Wound Tissue Color 100 % 10/05/22 0920   Periwound Area Intact;Pink;Edematous 10/05/22 0920   Wound Length (cm) 3.4 cm 10/05/22 0920   Wound Width (cm) 1.9  cm 10/05/22 0920   Wound Depth (cm) 0 cm 10/05/22 0920   Wound Volume (cm^3) 0 cm^3 10/05/22 0920   Wound Surface Area (cm^2) 6.46 cm^2 10/05/22 0920   Care Cleansed with:;Soap and water 10/05/22 0920   Dressing Applied;Calcium alginate;Island/border 10/05/22 0920       Claude was seen in the clinic room and placed in the supine position on the treatment table.  The dressing was removed.The leg was cleansed with Easi-clense sponges and dried thoroughly.     Plan of Care: Aquacel border dressing to cover the wound.     Plan:     Left lower extremity dressed as detailed above.   Patient was instructed to not get the dressings wet and to use cast covers for showering.  Should the dressing become wet, he is to remove it, place another aquacel border dressing to the area. He should then notify this office as soon as possible to have a new dressing applied.    Discussed PVD and PAD with patient. Discussed toe pressure, venous ultrasound, and arterial ultrasound results with patient and patient's son. Unable to utilize therapeutic compression due to severe bilateral lower extremity arterial disease. Dr. Lewis referred patient to Dr. Kothari for LLE angio/intervention.  Instructed patient to elevate legs whenever sedentary and follow a low sodium diet.       Discussed nutrition and the role of protein in wound healing with the patient. Instructed patient to continue protein regimen for wound healing.     Instructed patient to continue taking bumetanide as prescribed.     Written and verbal instructions given to patient.  RTC in 1 week    Aneta Olmstead PA-C

## 2022-10-12 ENCOUNTER — OFFICE VISIT (OUTPATIENT)
Dept: WOUND CARE | Facility: CLINIC | Age: 87
End: 2022-10-12
Payer: MEDICARE

## 2022-10-12 VITALS
SYSTOLIC BLOOD PRESSURE: 150 MMHG | BODY MASS INDEX: 28.75 KG/M2 | HEART RATE: 56 BPM | TEMPERATURE: 98 F | WEIGHT: 183.19 LBS | HEIGHT: 67 IN | DIASTOLIC BLOOD PRESSURE: 65 MMHG

## 2022-10-12 DIAGNOSIS — I50.31 ACUTE DIASTOLIC HEART FAILURE: ICD-10-CM

## 2022-10-12 DIAGNOSIS — I87.2 VENOUS STASIS DERMATITIS OF BOTH LOWER EXTREMITIES: ICD-10-CM

## 2022-10-12 DIAGNOSIS — I73.9 PAD (PERIPHERAL ARTERY DISEASE): ICD-10-CM

## 2022-10-12 DIAGNOSIS — L97.922 ULCER OF LEFT LOWER EXTREMITY WITH FAT LAYER EXPOSED: Primary | ICD-10-CM

## 2022-10-12 DIAGNOSIS — R60.0 EDEMA OF BOTH LOWER EXTREMITIES: ICD-10-CM

## 2022-10-12 DIAGNOSIS — I10 HYPERTENSION, UNSPECIFIED TYPE: ICD-10-CM

## 2022-10-12 PROCEDURE — 3288F FALL RISK ASSESSMENT DOCD: CPT | Mod: CPTII,S$GLB,,

## 2022-10-12 PROCEDURE — 99499 UNLISTED E&M SERVICE: CPT | Mod: S$GLB,,,

## 2022-10-12 PROCEDURE — 99214 OFFICE O/P EST MOD 30 MIN: CPT | Mod: S$GLB,,,

## 2022-10-12 PROCEDURE — 99999 PR PBB SHADOW E&M-EST. PATIENT-LVL III: ICD-10-PCS | Mod: PBBFAC,,,

## 2022-10-12 PROCEDURE — 1159F PR MEDICATION LIST DOCUMENTED IN MEDICAL RECORD: ICD-10-PCS | Mod: CPTII,S$GLB,,

## 2022-10-12 PROCEDURE — 99999 PR PBB SHADOW E&M-EST. PATIENT-LVL III: CPT | Mod: PBBFAC,,,

## 2022-10-12 PROCEDURE — 1101F PT FALLS ASSESS-DOCD LE1/YR: CPT | Mod: CPTII,S$GLB,,

## 2022-10-12 PROCEDURE — 1125F AMNT PAIN NOTED PAIN PRSNT: CPT | Mod: CPTII,S$GLB,,

## 2022-10-12 PROCEDURE — 3288F PR FALLS RISK ASSESSMENT DOCUMENTED: ICD-10-PCS | Mod: CPTII,S$GLB,,

## 2022-10-12 PROCEDURE — 99214 PR OFFICE/OUTPT VISIT, EST, LEVL IV, 30-39 MIN: ICD-10-PCS | Mod: S$GLB,,,

## 2022-10-12 PROCEDURE — 1101F PR PT FALLS ASSESS DOC 0-1 FALLS W/OUT INJ PAST YR: ICD-10-PCS | Mod: CPTII,S$GLB,,

## 2022-10-12 PROCEDURE — 99499 RISK ADDL DX/OHS AUDIT: ICD-10-PCS | Mod: S$GLB,,,

## 2022-10-12 PROCEDURE — 1125F PR PAIN SEVERITY QUANTIFIED, PAIN PRESENT: ICD-10-PCS | Mod: CPTII,S$GLB,,

## 2022-10-12 PROCEDURE — 1159F MED LIST DOCD IN RCRD: CPT | Mod: CPTII,S$GLB,,

## 2022-10-12 NOTE — PROGRESS NOTES
Subjective:       Patient ID: Claude T Dupuis is a 89 y.o. male     Chief Complaint: Wound Check    Patient presents with his son for a reevaluation of a left lower extremity wound. Patient follows with Dr. Lewis for PVD. No complaints at this time. Denies fever, chills, erythema, warmth, or purulent drainage.      9/7/22 CV Toe Pressures:   · Right TBI 0.35, suggestive of severe right lower extremity arterial disease.  · Left TBI 0.14, is suggestive of severe left lower extremity arterial disease.  · Digit waveforms are mildly dampened on the left.    9/7/22 Ultrasound Doppler Arterial:  · There is scattered atherosclerotic plaque throughout the bilateral lower extremity arteries.  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  · Right anterior tibial artery stenosis, greater than 75%.  · Right posterior tibial artery is occluded.  · Right peroneal artery not visualized, possibly occluded.  · Left tibio peroneal trunk artery stenosis, 50-75%.  · Left anterior tibial artery stenosis, greater than 75%.  · Left posterior tibial artery is occluded.    Review of Systems   Constitutional:  Negative for activity change, chills, diaphoresis, fatigue and fever.   Respiratory:  Negative for apnea, chest tightness and shortness of breath.    Cardiovascular:  Positive for leg swelling. Negative for chest pain and palpitations.   Musculoskeletal:  Negative for gait problem and joint swelling.   Skin:  Positive for wound. Negative for pallor and rash.   Neurological:  Negative for syncope, weakness and numbness.   Psychiatric/Behavioral:  Negative for agitation. The patient is not nervous/anxious.    All other systems reviewed and are negative.    Objective:      Physical Exam  Vitals reviewed.   Constitutional:       General: He is not in acute distress.     Appearance: Normal appearance.   HENT:      Right Ear: Decreased hearing noted.      Left Ear: Decreased hearing noted.   Cardiovascular:       Rate and Rhythm: Normal rate and regular rhythm.   Pulmonary:      Effort: No respiratory distress.   Musculoskeletal:         General: No swelling.      Right lower leg: Edema present.      Left lower leg: Edema present.   Skin:     General: Skin is warm and dry.      Findings: Wound present. No erythema.          Neurological:      General: No focal deficit present.      Mental Status: He is alert and oriented to person, place, and time.   Psychiatric:         Mood and Affect: Mood normal.         Behavior: Behavior normal.         Thought Content: Thought content normal.         Judgment: Judgment normal.       Assessment:       1. Ulcer of left lower extremity with fat layer exposed    2. Edema of both lower extremities    3. PAD (peripheral artery disease)    4. Acute diastolic heart failure    5. Venous stasis dermatitis of both lower extremities    6. Hypertension, unspecified type           Altered Skin Integrity Left lower;medial Leg (Active)       Altered Skin Integrity Present on Admission:    Side: Left   Orientation: lower;medial   Location: Leg   Wound Number:    Is this injury device related?:    Primary Wound Type:    Description of Altered Skin Integrity:    Ankle-Brachial Index:    Pulses:    Removal Indication and Assessment:    Wound Outcome:    (Retired) Wound Length (cm):    (Retired) Wound Width (cm):    (Retired) Depth (cm):    Wound Description (Comments):    Removal Indications:    Wound Image   10/12/22 0833   Dressing Appearance Dry;Intact;Clean;Moist drainage 10/12/22 0833   Drainage Amount Small 10/12/22 0833   Drainage Characteristics/Odor [place];Yellow 10/12/22 0833   Red (%), Wound Tissue Color 100 % 10/12/22 0833   Periwound Area Intact;Edematous;Pink 10/12/22 0833   Wound Length (cm) 3.5 cm 10/12/22 0833   Wound Width (cm) 2 cm 10/12/22 0833   Wound Depth (cm) 0 cm 10/12/22 0833   Wound Volume (cm^3) 0 cm^3 10/12/22 0833   Wound Surface Area (cm^2) 7 cm^2 10/12/22 0833   Care Cleansed  with:;Soap and water 10/12/22 0833   Dressing Applied;Calcium alginate;Island/border 10/12/22 0833       Claude was seen in the clinic room and placed in the supine position on the treatment table.  The dressing was removed.The leg was cleansed with Easi-clense sponges and dried thoroughly.     Plan of Care: Aquacel border dressing to cover the wound.     Plan:     Left lower extremity dressed as detailed above.   Patient was instructed to not get the dressings wet and to use cast covers for showering.  Should the dressing become wet, he is to remove it, place another aquacel border dressing to the area. He should then notify this office as soon as possible to have a new dressing applied.    Discussed PVD with patient. Discussed toe pressure, venous ultrasound, and arterial ultrasound results with patient and patient's son. Unable to utilize therapeutic compression due to severe bilateral lower extremity arterial disease. Dr. Lewis referred patient to Dr. Kothari for LLE angio/intervention.  Instructed patient to elevate legs whenever sedentary and follow a low sodium diet.       Discussed nutrition and the role of protein in wound healing with the patient. Instructed patient to continue protein regimen for wound healing.     Instructed patient to continue taking bumetanide as prescribed.     Written and verbal instructions given to patient.  RTC in 1 week    Aneta Olmstead PA-C

## 2022-10-19 ENCOUNTER — OFFICE VISIT (OUTPATIENT)
Dept: WOUND CARE | Facility: CLINIC | Age: 87
End: 2022-10-19
Payer: MEDICARE

## 2022-10-19 VITALS
WEIGHT: 184.5 LBS | BODY MASS INDEX: 28.96 KG/M2 | HEART RATE: 70 BPM | SYSTOLIC BLOOD PRESSURE: 130 MMHG | HEIGHT: 67 IN | TEMPERATURE: 98 F | DIASTOLIC BLOOD PRESSURE: 60 MMHG

## 2022-10-19 DIAGNOSIS — L97.922 ULCER OF LEFT LOWER EXTREMITY WITH FAT LAYER EXPOSED: Primary | ICD-10-CM

## 2022-10-19 PROCEDURE — 1125F PR PAIN SEVERITY QUANTIFIED, PAIN PRESENT: ICD-10-PCS | Mod: CPTII,S$GLB,, | Performed by: SURGERY

## 2022-10-19 PROCEDURE — 1159F PR MEDICATION LIST DOCUMENTED IN MEDICAL RECORD: ICD-10-PCS | Mod: CPTII,S$GLB,, | Performed by: SURGERY

## 2022-10-19 PROCEDURE — 99999 PR PBB SHADOW E&M-EST. PATIENT-LVL III: CPT | Mod: PBBFAC,,, | Performed by: SURGERY

## 2022-10-19 PROCEDURE — 1101F PR PT FALLS ASSESS DOC 0-1 FALLS W/OUT INJ PAST YR: ICD-10-PCS | Mod: CPTII,S$GLB,, | Performed by: SURGERY

## 2022-10-19 PROCEDURE — 1125F AMNT PAIN NOTED PAIN PRSNT: CPT | Mod: CPTII,S$GLB,, | Performed by: SURGERY

## 2022-10-19 PROCEDURE — 3288F PR FALLS RISK ASSESSMENT DOCUMENTED: ICD-10-PCS | Mod: CPTII,S$GLB,, | Performed by: SURGERY

## 2022-10-19 PROCEDURE — 1159F MED LIST DOCD IN RCRD: CPT | Mod: CPTII,S$GLB,, | Performed by: SURGERY

## 2022-10-19 PROCEDURE — 99999 PR PBB SHADOW E&M-EST. PATIENT-LVL III: ICD-10-PCS | Mod: PBBFAC,,, | Performed by: SURGERY

## 2022-10-19 PROCEDURE — 99212 OFFICE O/P EST SF 10 MIN: CPT | Mod: S$GLB,,, | Performed by: SURGERY

## 2022-10-19 PROCEDURE — 99212 PR OFFICE/OUTPT VISIT, EST, LEVL II, 10-19 MIN: ICD-10-PCS | Mod: S$GLB,,, | Performed by: SURGERY

## 2022-10-19 PROCEDURE — 3288F FALL RISK ASSESSMENT DOCD: CPT | Mod: CPTII,S$GLB,, | Performed by: SURGERY

## 2022-10-19 PROCEDURE — 1101F PT FALLS ASSESS-DOCD LE1/YR: CPT | Mod: CPTII,S$GLB,, | Performed by: SURGERY

## 2022-10-19 NOTE — PROGRESS NOTES
[facility].  He has an ulcer on his left gaiter area.  He also has tremendous swelling of that leg it is approximately 1.3 times the size of the other side with 2+ edema.  He has a whole host of medical problems which I have reviewed, the most common issue which affects him is that he also has peripheral vascular disease.  However at 89 with a small ulcer I would not intervene unless we absolutely have to.  I clearly think he needs some compression, but because of his vascular disease we can not give him high compression.  I would try Tubigrip gently and see if that gives him a little bit of compression on top of the wound care.  I discussed this with the patient, his son is there with him and this is how we will proceed.

## 2022-10-20 ENCOUNTER — TELEPHONE (OUTPATIENT)
Dept: CARDIOLOGY | Facility: CLINIC | Age: 87
End: 2022-10-20
Payer: MEDICARE

## 2022-10-20 NOTE — TELEPHONE ENCOUNTER
----- Message from Gordo Kothari MD sent at 10/2/2022 11:37 AM CDT -----  Abigail Ms Clayton  Can we assist with visit   He has all the studies      Thanks    ZN  ----- Message -----  From: Jose Antonio Lewis MD PhD  Sent: 9/28/2022   5:22 PM CDT  To: Gordo Kothari MD

## 2022-10-20 NOTE — TELEPHONE ENCOUNTER
Reached out to pt's son to arrange an appt with Dr Kothari    He will be out of town 10/24    Appt arranged 10/31

## 2022-10-26 ENCOUNTER — LAB VISIT (OUTPATIENT)
Dept: LAB | Facility: HOSPITAL | Age: 87
End: 2022-10-26
Attending: INTERNAL MEDICINE
Payer: MEDICARE

## 2022-10-26 DIAGNOSIS — I70.91 GENERALIZED ATHEROSCLEROSIS: ICD-10-CM

## 2022-10-26 DIAGNOSIS — L97.922 ULCER OF LEFT LOWER EXTREMITY WITH FAT LAYER EXPOSED: ICD-10-CM

## 2022-10-26 LAB
ALBUMIN SERPL BCP-MCNC: 3.7 G/DL (ref 3.5–5.2)
ALP SERPL-CCNC: 76 U/L (ref 55–135)
ALT SERPL W/O P-5'-P-CCNC: 17 U/L (ref 10–44)
ANION GAP SERPL CALC-SCNC: 12 MMOL/L (ref 8–16)
AST SERPL-CCNC: 20 U/L (ref 10–40)
BILIRUB SERPL-MCNC: 1.1 MG/DL (ref 0.1–1)
BUN SERPL-MCNC: 20 MG/DL (ref 10–30)
CALCIUM SERPL-MCNC: 9.5 MG/DL (ref 8.7–10.5)
CHLORIDE SERPL-SCNC: 101 MMOL/L (ref 95–110)
CHOLEST SERPL-MCNC: 147 MG/DL (ref 120–199)
CHOLEST/HDLC SERPL: 3.2 {RATIO} (ref 2–5)
CO2 SERPL-SCNC: 28 MMOL/L (ref 23–29)
CREAT SERPL-MCNC: 1.1 MG/DL (ref 0.5–1.4)
EST. GFR  (NO RACE VARIABLE): >60 ML/MIN/1.73 M^2
GLUCOSE SERPL-MCNC: 156 MG/DL (ref 70–110)
HDLC SERPL-MCNC: 46 MG/DL (ref 40–75)
HDLC SERPL: 31.3 % (ref 20–50)
LDLC SERPL CALC-MCNC: 87.2 MG/DL (ref 63–159)
NONHDLC SERPL-MCNC: 101 MG/DL
POTASSIUM SERPL-SCNC: 3.7 MMOL/L (ref 3.5–5.1)
PROT SERPL-MCNC: 7.2 G/DL (ref 6–8.4)
SODIUM SERPL-SCNC: 141 MMOL/L (ref 136–145)
TRIGL SERPL-MCNC: 69 MG/DL (ref 30–150)

## 2022-10-26 PROCEDURE — 80061 LIPID PANEL: CPT | Performed by: INTERNAL MEDICINE

## 2022-10-26 PROCEDURE — 36415 COLL VENOUS BLD VENIPUNCTURE: CPT | Mod: PO | Performed by: INTERNAL MEDICINE

## 2022-10-26 PROCEDURE — 80053 COMPREHEN METABOLIC PANEL: CPT | Performed by: INTERNAL MEDICINE

## 2022-10-27 ENCOUNTER — OFFICE VISIT (OUTPATIENT)
Dept: WOUND CARE | Facility: CLINIC | Age: 87
End: 2022-10-27
Payer: MEDICARE

## 2022-10-27 VITALS
TEMPERATURE: 99 F | HEIGHT: 67 IN | SYSTOLIC BLOOD PRESSURE: 138 MMHG | WEIGHT: 179.69 LBS | BODY MASS INDEX: 28.2 KG/M2 | DIASTOLIC BLOOD PRESSURE: 56 MMHG | HEART RATE: 57 BPM

## 2022-10-27 DIAGNOSIS — I73.9 PAD (PERIPHERAL ARTERY DISEASE): ICD-10-CM

## 2022-10-27 DIAGNOSIS — I10 HYPERTENSION, UNSPECIFIED TYPE: ICD-10-CM

## 2022-10-27 DIAGNOSIS — L97.922 ULCER OF LEFT LOWER EXTREMITY WITH FAT LAYER EXPOSED: Primary | ICD-10-CM

## 2022-10-27 DIAGNOSIS — R60.0 EDEMA OF BOTH LOWER EXTREMITIES: ICD-10-CM

## 2022-10-27 DIAGNOSIS — I87.2 VENOUS STASIS DERMATITIS OF BOTH LOWER EXTREMITIES: ICD-10-CM

## 2022-10-27 DIAGNOSIS — I50.31 ACUTE DIASTOLIC HEART FAILURE: ICD-10-CM

## 2022-10-27 PROCEDURE — 1101F PR PT FALLS ASSESS DOC 0-1 FALLS W/OUT INJ PAST YR: ICD-10-PCS | Mod: CPTII,S$GLB,,

## 2022-10-27 PROCEDURE — 99999 PR PBB SHADOW E&M-EST. PATIENT-LVL III: ICD-10-PCS | Mod: PBBFAC,,,

## 2022-10-27 PROCEDURE — 3288F PR FALLS RISK ASSESSMENT DOCUMENTED: ICD-10-PCS | Mod: CPTII,S$GLB,,

## 2022-10-27 PROCEDURE — 99499 UNLISTED E&M SERVICE: CPT | Mod: S$GLB,,,

## 2022-10-27 PROCEDURE — 3288F FALL RISK ASSESSMENT DOCD: CPT | Mod: CPTII,S$GLB,,

## 2022-10-27 PROCEDURE — 1126F AMNT PAIN NOTED NONE PRSNT: CPT | Mod: CPTII,S$GLB,,

## 2022-10-27 PROCEDURE — 1126F PR PAIN SEVERITY QUANTIFIED, NO PAIN PRESENT: ICD-10-PCS | Mod: CPTII,S$GLB,,

## 2022-10-27 PROCEDURE — 1159F PR MEDICATION LIST DOCUMENTED IN MEDICAL RECORD: ICD-10-PCS | Mod: CPTII,S$GLB,,

## 2022-10-27 PROCEDURE — 99214 OFFICE O/P EST MOD 30 MIN: CPT | Mod: S$GLB,,,

## 2022-10-27 PROCEDURE — 1101F PT FALLS ASSESS-DOCD LE1/YR: CPT | Mod: CPTII,S$GLB,,

## 2022-10-27 PROCEDURE — 99499 RISK ADDL DX/OHS AUDIT: ICD-10-PCS | Mod: S$GLB,,,

## 2022-10-27 PROCEDURE — 1159F MED LIST DOCD IN RCRD: CPT | Mod: CPTII,S$GLB,,

## 2022-10-27 PROCEDURE — 99999 PR PBB SHADOW E&M-EST. PATIENT-LVL III: CPT | Mod: PBBFAC,,,

## 2022-10-27 PROCEDURE — 99214 PR OFFICE/OUTPT VISIT, EST, LEVL IV, 30-39 MIN: ICD-10-PCS | Mod: S$GLB,,,

## 2022-10-27 NOTE — PROGRESS NOTES
Subjective:       Patient ID: Claude T Dupuis is a 89 y.o. male     Chief Complaint: Wound Check    Patient presents with his son for a reevaluation of a left lower extremity wound. Patient follows with Dr. Lewis for PAD. No complaints at this time. Denies fever, chills, erythema, warmth, or purulent drainage.      9/7/22 CV Toe Pressures:   · Right TBI 0.35, suggestive of severe right lower extremity arterial disease.  · Left TBI 0.14, is suggestive of severe left lower extremity arterial disease.  · Digit waveforms are mildly dampened on the left.    9/7/22 Ultrasound Doppler Arterial:  · There is scattered atherosclerotic plaque throughout the bilateral lower extremity arteries.  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  · Right anterior tibial artery stenosis, greater than 75%.  · Right posterior tibial artery is occluded.  · Right peroneal artery not visualized, possibly occluded.  · Left tibio peroneal trunk artery stenosis, 50-75%.  · Left anterior tibial artery stenosis, greater than 75%.  · Left posterior tibial artery is occluded.    Review of Systems   Constitutional:  Negative for activity change, chills, diaphoresis, fatigue and fever.   Respiratory:  Negative for apnea, chest tightness and shortness of breath.    Cardiovascular:  Positive for leg swelling. Negative for chest pain and palpitations.   Musculoskeletal:  Negative for gait problem and joint swelling.   Skin:  Positive for wound. Negative for pallor and rash.   Neurological:  Negative for syncope, weakness and numbness.   Psychiatric/Behavioral:  Negative for agitation. The patient is not nervous/anxious.    All other systems reviewed and are negative.    Objective:      Physical Exam  Vitals reviewed.   Constitutional:       General: He is not in acute distress.     Appearance: Normal appearance.   HENT:      Right Ear: Decreased hearing noted.      Left Ear: Decreased hearing noted.   Cardiovascular:       Rate and Rhythm: Normal rate and regular rhythm.   Pulmonary:      Effort: No respiratory distress.   Musculoskeletal:         General: No swelling.      Right lower leg: Edema present.      Left lower leg: Edema present.   Skin:     General: Skin is warm and dry.      Findings: Wound present. No erythema.          Neurological:      General: No focal deficit present.      Mental Status: He is alert and oriented to person, place, and time.   Psychiatric:         Mood and Affect: Mood normal.         Behavior: Behavior normal.         Thought Content: Thought content normal.         Judgment: Judgment normal.       Assessment:       1. Ulcer of left lower extremity with fat layer exposed    2. Edema of both lower extremities    3. PAD (peripheral artery disease)    4. Acute diastolic heart failure    5. Venous stasis dermatitis of both lower extremities    6. Hypertension, unspecified type           Altered Skin Integrity Left lower;medial Leg (Active)       Altered Skin Integrity Present on Admission:    Side: Left   Orientation: lower;medial   Location: Leg   Wound Number:    Is this injury device related?:    Primary Wound Type:    Description of Altered Skin Integrity:    Ankle-Brachial Index:    Pulses:    Removal Indication and Assessment:    Wound Outcome:    (Retired) Wound Length (cm):    (Retired) Wound Width (cm):    (Retired) Depth (cm):    Wound Description (Comments):    Removal Indications:    Wound Image   10/27/22 0905   Dressing Appearance Dry;Intact;Clean;Dried drainage 10/27/22 0905   Drainage Amount Small 10/27/22 0905   Drainage Characteristics/Odor Serous 10/27/22 0905   Red (%), Wound Tissue Color 100 % 10/27/22 0905   Periwound Area Intact;Pink;Edematous 10/27/22 0905   Wound Length (cm) 2.7 cm 10/27/22 0905   Wound Width (cm) 1.7 cm 10/27/22 0905   Wound Depth (cm) 0.1 cm 10/27/22 0905   Wound Volume (cm^3) 0.459 cm^3 10/27/22 0905   Wound Surface Area (cm^2) 4.59 cm^2 10/27/22 0905   Care  Cleansed with:;Soap and water 10/27/22 0905   Dressing Applied;Island/border;Silicone 10/27/22 0905   Periwound Care Skin barrier film applied 10/27/22 0905       Claude was seen in the clinic room and placed in the supine position on the treatment table.  The dressing was removed.The leg was cleansed with Easi-clense sponges and dried thoroughly.     Plan of Care: Mepilex border dressing to cover the wound.     Plan:     Left lower extremity dressed as detailed above.   Patient was instructed to not get the dressings wet and to use cast covers for showering.  Should the dressing become wet, he is to remove it, place another aquacel border dressing to the area. He should then notify this office as soon as possible to have a new dressing applied.    Discussed PAD with patient. Discussed toe pressure, venous ultrasound, and arterial ultrasound results with patient and patient's son. Unable to utilize therapeutic compression due to severe bilateral lower extremity arterial disease. Instructed patient to keep appointment with Dr. Kothari on 10/31/22 for possible LLE angio/intervention.    Instructed patient to elevate legs whenever sedentary and follow a low sodium diet.       Discussed nutrition and the role of protein in wound healing with the patient. Instructed patient to continue protein regimen for wound healing.     Instructed patient to continue taking bumetanide as prescribed.     Written and verbal instructions given to patient.  RTC in 1 week    Aneta Olmstead PA-C

## 2022-10-30 NOTE — PROGRESS NOTES
Subjective:   Patient ID:  Claude T Dupuis is a 89 y.o. male who presents for management of Non-healing Wound, Venous Insufficiency, Peripheral Arterial Disease, Coronary Artery Disease, Hyperlipidemia, and Hypertension      HPI:       89-year-old male in overall good health with past medical history of peripheral arterial disease, coronary disease status post CABG in 2007) with heart versus proximal left greater saphenous vein, colon cancer, Guillain-[place] syndrome, hypertension, hyperlipidemia, and venous insufficiency complicated with a long history of edema hyperpigmentation and recent nonhealing ulcer.  In July 2022 he developed bilateral ankle ulcers after burning self with hot water.  The right ankle wound healed.  The left ankle continues to improve with excellent care by Dr. Pearson and CHESTER Olmstead. Talking with him and his son during the visit they mentioned a long history of edema of bilateral ankles left worse than right.  No previous ulceration until this event. He denies claudication.  He denies rest pain.  Periwound pain has drastically improved.      Venous US 9/2022     No DVT     + reflux > 500 msec L GSV; 4.8 mm   + reflux > 500 msec R GSV; 7.1 mm    Thrombosed R LSV     TBI 9/2022     R 0.35 (57 mmHg)    L 0.14 (23 mmHg)      Arterial US 9/2022     Bilateral infra-popliteal disease   Right:    TPT     AT > 75%    PT     Left:    TPT 50-75%    AT > 75%    PT        Care team:      Dr. Lili Olmstead       Patient Active Problem List    Diagnosis Date Noted    Coronary artery disease     PAD (peripheral artery disease) 09/28/2022    Acute diastolic heart failure 09/07/2022    Angina pectoris 09/07/2022    Ulcer of left lower extremity with fat layer exposed 09/07/2022    Edema of both lower extremities 09/07/2022    Venous insufficiency of left lower extremity 09/07/2022    Hx of CABG 09/07/2022    Left leg pain 09/07/2022    Hypertension 02/21/2022            Right Arm BP - Sittin/65  Left Arm BP - Sittin/69        LABS    LAST HbA1c  No results found for: HGBA1C    Lipid panel  Lab Results   Component Value Date    CHOL 147 10/26/2022     Lab Results   Component Value Date    HDL 46 10/26/2022     Lab Results   Component Value Date    LDLCALC 87.2 10/26/2022     Lab Results   Component Value Date    TRIG 69 10/26/2022     Lab Results   Component Value Date    CHOLHDL 31.3 10/26/2022            ROS      Objective:   Physical Exam    2022                10/2022        Assessment:     1. Venous insufficiency of left lower extremity    2. Venous stasis dermatitis of both lower extremities    3. Hx of CABG    4. Primary hypertension    5. PAD (peripheral artery disease)    6. Ulcer of left lower extremity with fat layer exposed    7. Edema of both lower extremities    8. Coronary artery disease without angina pectoris, unspecified vessel or lesion type, unspecified whether native or transplanted heart        Plan:     Agree with the excellent care initiated by the wound care team at this point there is no clinical indication for peripheral arterial revascularization. He has a venous ulcer fortunately without ischemic symptoms.  Medical therapy for peripheral arterial disease with guideline directed therapy.  Continue with wound care.  Regarding venous insufficiency he has a long history of edema, hyperpigmentation, and the recent slow healing ulcer.  Greater saphenous ablation will expedite healing of the wound and keep him in remission without the cycle of recurrent ulceration.  We had a long discussion with the patient and the son and both are agreeable to proceed with ablation using Varithena.  He will be called with the date and time for the procedure.  Consider compression stockings after complete healing of his wound if tolerated.    Continue with current medical plan and lifestyle changes.  Return sooner for concerns or questions. If symptoms  persist go to the ED  I have reviewed all pertinent data on this patient       I have reviewed the patient's medical history in detail and updated the computerized patient record.    Orders Placed This Encounter   Procedures    Case Request-Cath Lab: Ablation     Standing Status:   Standing     Number of Occurrences:   1     Order Specific Question:   CPT Code:     Answer:   SD INJ, NONCMPND FOAM SCLEROSANT, 1 VEIN [83740]     Order Specific Question:   Medical Necessity:     Answer:   Medically Urgent [101]     Order Specific Question:   Is an on-site pathologist required for this procedure?     Answer:   N/A         Follow up as scheduled. Return sooner for concerns or questions            He expressed verbal understanding and agreed with the plan        Patient's Medications   New Prescriptions    No medications on file   Previous Medications    ASPIRIN 325 MG TABLET    Take 325 mg by mouth once daily.    BUMETANIDE (BUMEX) 0.5 MG TAB    Take 1 tablet (0.5 mg total) by mouth 2 (two) times a day.    ISOSORBIDE MONONITRATE (IMDUR) 30 MG 24 HR TABLET    once daily.    LEVOTHYROXINE (SYNTHROID) 100 MCG TABLET    Take 100 mcg by mouth once daily.    LISINOPRIL (PRINIVIL,ZESTRIL) 20 MG TABLET    Take 20 mg by mouth once daily.    METOPROLOL TARTRATE (LOPRESSOR) 100 MG TABLET    Take 100 mg by mouth 2 (two) times daily.    RIVAROXABAN (XARELTO) 10 MG TAB    Take 1 tablet (10 mg total) by mouth daily with dinner or evening meal.   Modified Medications    No medications on file   Discontinued Medications    No medications on file

## 2022-10-31 ENCOUNTER — OFFICE VISIT (OUTPATIENT)
Dept: CARDIOLOGY | Facility: CLINIC | Age: 87
End: 2022-10-31
Payer: MEDICARE

## 2022-10-31 VITALS
BODY MASS INDEX: 27 KG/M2 | HEART RATE: 68 BPM | SYSTOLIC BLOOD PRESSURE: 130 MMHG | WEIGHT: 172 LBS | HEIGHT: 67 IN | DIASTOLIC BLOOD PRESSURE: 69 MMHG

## 2022-10-31 DIAGNOSIS — I73.9 PAD (PERIPHERAL ARTERY DISEASE): ICD-10-CM

## 2022-10-31 DIAGNOSIS — I10 PRIMARY HYPERTENSION: ICD-10-CM

## 2022-10-31 DIAGNOSIS — I87.2 VENOUS STASIS DERMATITIS OF BOTH LOWER EXTREMITIES: ICD-10-CM

## 2022-10-31 DIAGNOSIS — L97.922 ULCER OF LEFT LOWER EXTREMITY WITH FAT LAYER EXPOSED: ICD-10-CM

## 2022-10-31 DIAGNOSIS — R60.0 EDEMA OF BOTH LOWER EXTREMITIES: ICD-10-CM

## 2022-10-31 DIAGNOSIS — Z95.1 HX OF CABG: ICD-10-CM

## 2022-10-31 DIAGNOSIS — I87.2 VENOUS INSUFFICIENCY OF LEFT LOWER EXTREMITY: Primary | ICD-10-CM

## 2022-10-31 DIAGNOSIS — I25.10 CORONARY ARTERY DISEASE WITHOUT ANGINA PECTORIS, UNSPECIFIED VESSEL OR LESION TYPE, UNSPECIFIED WHETHER NATIVE OR TRANSPLANTED HEART: ICD-10-CM

## 2022-10-31 PROCEDURE — 1159F MED LIST DOCD IN RCRD: CPT | Mod: CPTII,S$GLB,, | Performed by: INTERNAL MEDICINE

## 2022-10-31 PROCEDURE — 3288F FALL RISK ASSESSMENT DOCD: CPT | Mod: CPTII,S$GLB,, | Performed by: INTERNAL MEDICINE

## 2022-10-31 PROCEDURE — 1126F PR PAIN SEVERITY QUANTIFIED, NO PAIN PRESENT: ICD-10-PCS | Mod: CPTII,S$GLB,, | Performed by: INTERNAL MEDICINE

## 2022-10-31 PROCEDURE — 99214 OFFICE O/P EST MOD 30 MIN: CPT | Mod: S$GLB,,, | Performed by: INTERNAL MEDICINE

## 2022-10-31 PROCEDURE — 1101F PT FALLS ASSESS-DOCD LE1/YR: CPT | Mod: CPTII,S$GLB,, | Performed by: INTERNAL MEDICINE

## 2022-10-31 PROCEDURE — 99999 PR PBB SHADOW E&M-EST. PATIENT-LVL III: CPT | Mod: PBBFAC,,, | Performed by: INTERNAL MEDICINE

## 2022-10-31 PROCEDURE — 3288F PR FALLS RISK ASSESSMENT DOCUMENTED: ICD-10-PCS | Mod: CPTII,S$GLB,, | Performed by: INTERNAL MEDICINE

## 2022-10-31 PROCEDURE — 99999 PR PBB SHADOW E&M-EST. PATIENT-LVL III: ICD-10-PCS | Mod: PBBFAC,,, | Performed by: INTERNAL MEDICINE

## 2022-10-31 PROCEDURE — 1126F AMNT PAIN NOTED NONE PRSNT: CPT | Mod: CPTII,S$GLB,, | Performed by: INTERNAL MEDICINE

## 2022-10-31 PROCEDURE — 1101F PR PT FALLS ASSESS DOC 0-1 FALLS W/OUT INJ PAST YR: ICD-10-PCS | Mod: CPTII,S$GLB,, | Performed by: INTERNAL MEDICINE

## 2022-10-31 PROCEDURE — 99214 PR OFFICE/OUTPT VISIT, EST, LEVL IV, 30-39 MIN: ICD-10-PCS | Mod: S$GLB,,, | Performed by: INTERNAL MEDICINE

## 2022-10-31 PROCEDURE — 1159F PR MEDICATION LIST DOCUMENTED IN MEDICAL RECORD: ICD-10-PCS | Mod: CPTII,S$GLB,, | Performed by: INTERNAL MEDICINE

## 2022-11-02 ENCOUNTER — TELEPHONE (OUTPATIENT)
Dept: CARDIOLOGY | Facility: CLINIC | Age: 87
End: 2022-11-02
Payer: MEDICARE

## 2022-11-02 ENCOUNTER — OFFICE VISIT (OUTPATIENT)
Dept: CARDIOLOGY | Facility: CLINIC | Age: 87
End: 2022-11-02
Payer: MEDICARE

## 2022-11-02 VITALS
HEART RATE: 62 BPM | HEIGHT: 67 IN | SYSTOLIC BLOOD PRESSURE: 162 MMHG | BODY MASS INDEX: 28.34 KG/M2 | WEIGHT: 180.56 LBS | DIASTOLIC BLOOD PRESSURE: 68 MMHG

## 2022-11-02 DIAGNOSIS — I25.10 CORONARY ARTERY DISEASE INVOLVING NATIVE CORONARY ARTERY OF NATIVE HEART WITHOUT ANGINA PECTORIS: ICD-10-CM

## 2022-11-02 DIAGNOSIS — R56.9 SEIZURE: ICD-10-CM

## 2022-11-02 DIAGNOSIS — Z95.1 HX OF CABG: ICD-10-CM

## 2022-11-02 DIAGNOSIS — R41.0 CONFUSION: ICD-10-CM

## 2022-11-02 DIAGNOSIS — L97.922 ULCER OF LEFT LOWER EXTREMITY WITH FAT LAYER EXPOSED: ICD-10-CM

## 2022-11-02 DIAGNOSIS — M79.605 LEFT LEG PAIN: ICD-10-CM

## 2022-11-02 DIAGNOSIS — G45.9 TIA (TRANSIENT ISCHEMIC ATTACK): ICD-10-CM

## 2022-11-02 DIAGNOSIS — I73.9 PAD (PERIPHERAL ARTERY DISEASE): ICD-10-CM

## 2022-11-02 DIAGNOSIS — G45.9 TRANSIENT CEREBRAL ISCHEMIA, UNSPECIFIED TYPE: ICD-10-CM

## 2022-11-02 DIAGNOSIS — I10 PRIMARY HYPERTENSION: ICD-10-CM

## 2022-11-02 DIAGNOSIS — M79.601 RIGHT ARM PAIN: Primary | ICD-10-CM

## 2022-11-02 DIAGNOSIS — R60.0 EDEMA OF BOTH LOWER EXTREMITIES: ICD-10-CM

## 2022-11-02 DIAGNOSIS — I87.2 VENOUS INSUFFICIENCY OF LEFT LOWER EXTREMITY: ICD-10-CM

## 2022-11-02 PROCEDURE — 3288F PR FALLS RISK ASSESSMENT DOCUMENTED: ICD-10-PCS | Mod: CPTII,S$GLB,, | Performed by: INTERNAL MEDICINE

## 2022-11-02 PROCEDURE — 99499 UNLISTED E&M SERVICE: CPT | Mod: S$GLB,,, | Performed by: INTERNAL MEDICINE

## 2022-11-02 PROCEDURE — 99999 PR PBB SHADOW E&M-EST. PATIENT-LVL IV: CPT | Mod: PBBFAC,,, | Performed by: INTERNAL MEDICINE

## 2022-11-02 PROCEDURE — 3288F FALL RISK ASSESSMENT DOCD: CPT | Mod: CPTII,S$GLB,, | Performed by: INTERNAL MEDICINE

## 2022-11-02 PROCEDURE — 1101F PT FALLS ASSESS-DOCD LE1/YR: CPT | Mod: CPTII,S$GLB,, | Performed by: INTERNAL MEDICINE

## 2022-11-02 PROCEDURE — 1159F MED LIST DOCD IN RCRD: CPT | Mod: CPTII,S$GLB,, | Performed by: INTERNAL MEDICINE

## 2022-11-02 PROCEDURE — 99215 OFFICE O/P EST HI 40 MIN: CPT | Mod: S$GLB,,, | Performed by: INTERNAL MEDICINE

## 2022-11-02 PROCEDURE — 1126F PR PAIN SEVERITY QUANTIFIED, NO PAIN PRESENT: ICD-10-PCS | Mod: CPTII,S$GLB,, | Performed by: INTERNAL MEDICINE

## 2022-11-02 PROCEDURE — 99499 RISK ADDL DX/OHS AUDIT: ICD-10-PCS | Mod: S$GLB,,, | Performed by: INTERNAL MEDICINE

## 2022-11-02 PROCEDURE — 99999 PR PBB SHADOW E&M-EST. PATIENT-LVL IV: ICD-10-PCS | Mod: PBBFAC,,, | Performed by: INTERNAL MEDICINE

## 2022-11-02 PROCEDURE — 1101F PR PT FALLS ASSESS DOC 0-1 FALLS W/OUT INJ PAST YR: ICD-10-PCS | Mod: CPTII,S$GLB,, | Performed by: INTERNAL MEDICINE

## 2022-11-02 PROCEDURE — 1159F PR MEDICATION LIST DOCUMENTED IN MEDICAL RECORD: ICD-10-PCS | Mod: CPTII,S$GLB,, | Performed by: INTERNAL MEDICINE

## 2022-11-02 PROCEDURE — 1126F AMNT PAIN NOTED NONE PRSNT: CPT | Mod: CPTII,S$GLB,, | Performed by: INTERNAL MEDICINE

## 2022-11-02 PROCEDURE — 99215 PR OFFICE/OUTPT VISIT, EST, LEVL V, 40-54 MIN: ICD-10-PCS | Mod: S$GLB,,, | Performed by: INTERNAL MEDICINE

## 2022-11-02 NOTE — PROGRESS NOTES
"[facility]      Chief Complaint   Patient presents with    <9>Ulcer of left lower extremity with fat layer exposed       Patient ID: Claude T Dupuis is a 89 y.o. male with PAD, CAD s/p CABG (2007), HTN, colon cancer s/p cancer, Guillain [place] Syndrome, who presents for a follow up appointment.  Pertinent history/events are as follows:     -Pt presents for evaluation of left leg wound/ulcer.      -At our initial clinic visit on 9/7/2022: Mr. Cox is accompanied by his son.  He reports 6 weeks ago he "burned his left leg with hot water while getting into the shower.  States a large blister formed at the left lower leg.  He notes pain and burning sensation at the left lower leg.  States he went to the ED at [facility] 10 days after the injury and they "popped the blister and gave me antibiotics".   Plan:   LLE Wound/Ulcer- Likely due to burn with possible underlying venous insufficiency.  Check BLE venous reflux study, toe pressure study and arterial ultrasound.  Refer to wound care.  Treat with doxycycline 100 mg bid for 14 days.  Start bumex 0.5 mg bid.  Check cmp today and in 1 week.  Elevate legs when resting.  Pt to use Tylenol for pain.   CAD s/p CABD- No angina currently.  Continue current medications.  HTN- Controlled.  Continue current medications.     -At follow up clinic visit on 9/28/2022, Mr. Cox reports feeling much better.  He has no chest pain or SOB.  Review of wound care images shows the LLE wound is healing slowly.  Toe Pressure Study on 9/16/2022 demonstrated Left Toe Pressure 23 mmHg; Right Toe Pressure 57 mmHg.  BLE Arterial Ultrasound on 9/16/2022 revealed scattered atherosclerotic plaque throughout the bilateral lower extremity arteries.  Right tibio peroneal trunk artery is occluded in the distal segment.  Right anterior tibial artery stenosis, greater than 75%.  Right posterior tibial artery is occluded.  Left tibio peroneal trunk artery stenosis, 50-75%.  Left anterior " "tibial artery stenosis, greater than 75%. Left posterior tibial artery is occluded.  BLE Venous Reflux Study on 9/19/2022 revealed bilateral GSV reflux with no DVT.  The right smaller saphenous vein is noncompressible, consistent with superficial venous thrombosis (SVT)  Plan:   PAD with LLE Wound/Ulcer- Likely due to burn with possible underlying venous insufficiency.  Toe Pressure Study on 9/16/2022 demonstrated Left Toe Pressure 23 mmHg; Right Toe Pressure 57 mmHg.  BLE Arterial Ultrasound on 9/16/2022 revealed scattered atherosclerotic plaque throughout the bilateral lower extremity arteries.  Right tibio peroneal trunk artery is occluded in the distal segment.  Right anterior tibial artery stenosis, greater than 75%.  Right posterior tibial artery is occluded.  Left tibio peroneal trunk artery stenosis, 50-75%.  Left anterior tibial artery stenosis, greater than 75%. Left posterior tibial artery is occluded.  Continue wound care.  Treat with doxycycline 100 mg bid for 14 days.  Continue bumex 0.5 mg bid.  Elevate legs when resting.  Continue to use Tylenol for pain.  Given significant LLE PAD, will refer for evaluation for possible LLE angio/intervention with Dr. Kothari (in the event the wound does not continue to heal appropriately).  Continue to monitor.    RLE Superficial Venous Thrombosis- BLE Venous Reflux Study on 9/19/2022 revealed bilateral GSV reflux with no DVT.  The right smaller saphenous vein is noncompressible, consistent with superficial venous thrombosis (SVT).  Treat with Xarelto 10 mg daily for 45 days.    CAD s/p CABD- No angina currently.  Continue current medications.  HTN- Controlled.  Continue current medications.      HPI:  Mr. Cox reports episodes where he experiences sudden onset of discomfort in his left arm, follow by a period where he "feels like I'm going to pass out".  An episode was witnessed by his son yesterday.  He did not lose consciousness during the episode.  Episodes " occur once every 2 months.  He reports no chest pain or chest discomfort.  LLE wound/ulcer is healing.  Pt evaluated by Dr. Kothari on 10/31/2022, who plans to perform left greater saphenous ablation in an attempt to expedite healing of the wound and keep him in remission without the cycle of recurrent ulceration.     Past Medical History:   Diagnosis Date    Cancer     Colon cancer     Coronary artery disease     Hypertension      Past Surgical History:   Procedure Laterality Date    VEIN BYPASS SURGERY       Social History     Socioeconomic History    Marital status:    Tobacco Use    Smoking status: Never    Smokeless tobacco: Never   Substance and Sexual Activity    Alcohol use: No    Drug use: No    Sexual activity: Never     Family History   Problem Relation Age of Onset    Diabetes Mother        Review of patient's allergies indicates:  No Known Allergies    Medication List with Changes/Refills   Current Medications    ASPIRIN 325 MG TABLET    Take 325 mg by mouth once daily.    BUMETANIDE (BUMEX) 0.5 MG TAB    Take 1 tablet (0.5 mg total) by mouth 2 (two) times a day.    ISOSORBIDE MONONITRATE (IMDUR) 30 MG 24 HR TABLET    once daily.    LEVOTHYROXINE (SYNTHROID) 100 MCG TABLET    Take 100 mcg by mouth once daily.    LISINOPRIL (PRINIVIL,ZESTRIL) 20 MG TABLET    Take 20 mg by mouth once daily.    METOPROLOL TARTRATE (LOPRESSOR) 100 MG TABLET    Take 100 mg by mouth 2 (two) times daily.    RIVAROXABAN (XARELTO) 10 MG TAB    Take 1 tablet (10 mg total) by mouth daily with dinner or evening meal.       Review of Systems  Constitution: Denies chills, fever, and sweats.  HENT: Denies headaches or blurry vision.  Cardiovascular: Denies chest pain or irregular heart beat.  Respiratory: Denies cough or shortness of breath.  Gastrointestinal: Denies abdominal pain, nausea, or vomiting.  Musculoskeletal: Denies muscle cramps.  Neurological: Positive for left leg wound with pain and  "swelling.  Psychiatric/Behavioral: Normal mental status.  Hematologic/Lymphatic: Denies bleeding problem or easy bruising/bleeding.  Skin: Denies rash or suspicious lesions    Physical Examination  BP (!) 162/68   Pulse 62   Ht 5' 7" (1.702 m)   Wt 81.9 kg (180 lb 8.9 oz)   BMI 28.28 kg/m²     Constitutional: No acute distress, conversant  HEENT: Sclera anicteric, Pupils equal, round and reactive to light, extraocular motions intact, Oropharynx clear  Neck: No JVD, no carotid bruits  Cardiovascular: regular rate and rhythm, no murmur, rubs or gallops, normal S1/S2  Pulmonary: Clear to auscultation bilaterally  Abdominal: Abdomen soft, nontender, nondistended, positive bowel sounds  Extremities: BLE's with 1 pitting edema and venous stasis dermatitis  LLE with 4 x 5.5 cm ulcer/wound at medial aspect near ankle   Pulses:  Carotid pulses are 2+ on the right side, and 2+ on the left side.  Radial pulses are 2+ on the right side, and 2+ on the left side.   Femoral pulses are 2+ on the right side, and 2+ on the left side.  Popliteal pulses are 2+ on the right side, and 2+ on the left side.   Dorsalis pedis pulses are 2+ on the right side, and 2+ on the left side.   Posterior tibial pulses are 2+ on the right side, and 2+ on the left side.    Skin: No ecchymosis, erythema, or ulcers  Psych: Alert and oriented x 3, appropriate affect  Neuro: CNII-XII intact, no focal deficits    Labs:  Most Recent Data  CBC: No results found for: WBC, HGB, HCT, PLT, MCV, RDW  BMP:   Lab Results   Component Value Date     10/26/2022    K 3.7 10/26/2022     10/26/2022    CO2 28 10/26/2022    BUN 20 10/26/2022    CREATININE 1.1 10/26/2022     (H) 10/26/2022    CALCIUM 9.5 10/26/2022     LFTS;   Lab Results   Component Value Date    PROT 7.2 10/26/2022    ALBUMIN 3.7 10/26/2022    BILITOT 1.1 (H) 10/26/2022    AST 20 10/26/2022    ALKPHOS 76 10/26/2022    ALT 17 10/26/2022     COAGS: No results found for: INR, PROTIME, " PTT  FLP:   Lab Results   Component Value Date    CHOL 147 10/26/2022    HDL 46 10/26/2022    LDLCALC 87.2 10/26/2022    TRIG 69 10/26/2022    CHOLHDL 31.3 10/26/2022     CARDIAC: No results found for: TROPONINI, CKMB, BNP    Toe Pressure Study 9/16/2022:  Right TBI 0.35, suggestive of severe right lower extremity arterial disease.  Left TBI 0.14, is suggestive of severe left lower extremity arterial disease.  Digit waveforms are mildly dampened on the left.  Left Toe Pressure 23 mmHg; Right Toe Pressure 57 mmHg    BLE Arterial Ultrasound 9/16/2022:  There is scattered atherosclerotic plaque throughout the bilateral lower extremity arteries.  Right tibio peroneal trunk artery is occluded in the distal segment.  Right anterior tibial artery stenosis, greater than 75%.  Right posterior tibial artery is occluded.  Right peroneal artery not visualized, possibly occluded.  Left tibio peroneal trunk artery stenosis, 50-75%.  Left anterior tibial artery stenosis, greater than 75%.  Left posterior tibial artery is occluded.    BLE Venous Reflux Study 9/19/2022  There is no evidence of a right lower extremity DVT.  The right smaller saphenous vein is noncompressible, consistent with superficial venous thrombosis (SVT).  The right greater saphenous vein has reflux.  There is no evidence of a left lower extremity DVT.  The left greater saphenous vein has reflux.    Assessment/Plan:  Claude T Dupuis is a 89 y.o. male with PAD, CAD s/p CABG (2007), HTN, colon cancer s/p cancer, Guillain [place] Syndrome, who presents for a follow up appointment.    1. PAD with LLE Wound/Ulcer- Likely due to burn with possible underlying venous insufficiency.  Toe Pressure Study on 9/16/2022 demonstrated Left Toe Pressure 23 mmHg; Right Toe Pressure 57 mmHg.  BLE Arterial Ultrasound on 9/16/2022 revealed scattered atherosclerotic plaque throughout the bilateral lower extremity arteries.  Right tibio peroneal trunk artery is occluded in the distal  segment.  Right anterior tibial artery stenosis, greater than 75%.  Right posterior tibial artery is occluded.  Left tibio peroneal trunk artery stenosis, 50-75%.  Left anterior tibial artery stenosis, greater than 75%. Left posterior tibial artery is occluded.  Continue wound care.  Treat with doxycycline 100 mg bid for 14 days.  Continue bumex 0.5 mg bid.  Elevate legs when resting.  Pt evaluated by Dr. Kothari on 10/31/2022, who plans to perform left greater saphenous ablation in an attempt to expedite healing of the wound and keep him in remission without the cycle of recurrent ulceration.  Continue to monitor.     2. Episodes of Right Arm Pain/Confusion- Etiology unclear.  Concerning for seizure.  Must also consider myocardial ischemia, arrhythmia, and TIA.  Refer to Neurology for evaluation of possible seizure.  Check PET stress test, echo, carotid ultrasound, and 30 day event monitor.  Continue current medications.      2. RLE Superficial Venous Thrombosis- BLE Venous Reflux Study on 9/19/2022 revealed bilateral GSV reflux with no DVT.  The right smaller saphenous vein is noncompressible, consistent with superficial venous thrombosis (SVT).  Treat with Xarelto 10 mg daily for 45 days.      3. CAD s/p CABD- No angina currently.  Pt with episodes of Right Arm Pain/Confusion- Etiology unclear.  Concerning for seizure.   Must also consider myocardial ischemia, arrhythmia, and TIA.  Check PET stress test, echo, carotid ultrasound and 30 day event monitor.  Continue ASA, beta blocker ACEI, imdur.  Start atorvastatin 40 mg daily.      4. HTN- Controlled.  Continue current medications.      Will call pt with results of stress test and echo  Otherwise, follow up in 2 months with cmp and lipids prior     Total duration of face to face visit time 30 minutes.  Total time spent counseling greater than fifty percent of total visit time.  Counseling included discussion regarding imaging findings, diagnosis, possibilities,  treatment options, risks and benefits.  The patient had many questions regarding the options and long-term effects.    Jose Antonio Lewis MD, PhD  Interventional Cardiology

## 2022-11-02 NOTE — TELEPHONE ENCOUNTER
Good morning           Can someone please help with getting patient scheduled. A referral has been placed...      Thanks  Rupali
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
## 2022-11-03 ENCOUNTER — TELEPHONE (OUTPATIENT)
Dept: NEUROLOGY | Facility: CLINIC | Age: 87
End: 2022-11-03
Payer: MEDICARE

## 2022-11-03 ENCOUNTER — OFFICE VISIT (OUTPATIENT)
Dept: WOUND CARE | Facility: CLINIC | Age: 87
End: 2022-11-03
Payer: MEDICARE

## 2022-11-03 ENCOUNTER — HOSPITAL ENCOUNTER (OUTPATIENT)
Dept: CARDIOLOGY | Facility: HOSPITAL | Age: 87
Discharge: HOME | End: 2022-11-03
Attending: INTERNAL MEDICINE
Payer: MEDICARE

## 2022-11-03 VITALS
TEMPERATURE: 99 F | HEART RATE: 59 BPM | BODY MASS INDEX: 28.25 KG/M2 | HEIGHT: 67 IN | WEIGHT: 180 LBS | DIASTOLIC BLOOD PRESSURE: 60 MMHG | SYSTOLIC BLOOD PRESSURE: 131 MMHG

## 2022-11-03 DIAGNOSIS — I73.9 PAD (PERIPHERAL ARTERY DISEASE): ICD-10-CM

## 2022-11-03 DIAGNOSIS — R60.0 EDEMA OF BOTH LOWER EXTREMITIES: ICD-10-CM

## 2022-11-03 DIAGNOSIS — L97.922 ULCER OF LEFT LOWER EXTREMITY WITH FAT LAYER EXPOSED: Primary | ICD-10-CM

## 2022-11-03 DIAGNOSIS — M79.601 RIGHT ARM PAIN: ICD-10-CM

## 2022-11-03 DIAGNOSIS — R41.0 CONFUSION: ICD-10-CM

## 2022-11-03 DIAGNOSIS — I25.10 CORONARY ARTERY DISEASE INVOLVING NATIVE CORONARY ARTERY OF NATIVE HEART WITHOUT ANGINA PECTORIS: ICD-10-CM

## 2022-11-03 DIAGNOSIS — Z95.1 HX OF CABG: ICD-10-CM

## 2022-11-03 DIAGNOSIS — I87.2 VENOUS STASIS DERMATITIS OF BOTH LOWER EXTREMITIES: ICD-10-CM

## 2022-11-03 DIAGNOSIS — I50.31 ACUTE DIASTOLIC HEART FAILURE: ICD-10-CM

## 2022-11-03 DIAGNOSIS — I10 HYPERTENSION, UNSPECIFIED TYPE: ICD-10-CM

## 2022-11-03 LAB
LEFT ARM DIASTOLIC BLOOD PRESSURE: 73 MMHG
LEFT ARM SYSTOLIC BLOOD PRESSURE: 143 MMHG
LEFT CBA DIAS: 8 CM/S
LEFT CBA SYS: 47 CM/S
LEFT CCA DIST DIAS: 7 CM/S
LEFT CCA DIST SYS: 67 CM/S
LEFT CCA MID DIAS: 10 CM/S
LEFT CCA MID SYS: 56 CM/S
LEFT CCA PROX DIAS: 9 CM/S
LEFT CCA PROX SYS: 75 CM/S
LEFT ECA DIAS: 7 CM/S
LEFT ECA SYS: 73 CM/S
LEFT ICA DIST DIAS: 21 CM/S
LEFT ICA DIST SYS: 105 CM/S
LEFT ICA MID DIAS: 28 CM/S
LEFT ICA MID SYS: 105 CM/S
LEFT ICA PROX DIAS: 14 CM/S
LEFT ICA PROX SYS: 49 CM/S
LEFT VERTEBRAL DIAS: 6 CM/S
LEFT VERTEBRAL SYS: 32 CM/S
OHS CV CAROTID RIGHT ICA EDV HIGHEST: 14
OHS CV CAROTID ULTRASOUND LEFT ICA/CCA RATIO: 1.57
OHS CV CAROTID ULTRASOUND RIGHT ICA/CCA RATIO: 1.77
OHS CV PV CAROTID LEFT HIGHEST CCA: 75
OHS CV PV CAROTID LEFT HIGHEST ICA: 105
OHS CV PV CAROTID RIGHT HIGHEST CCA: 47
OHS CV PV CAROTID RIGHT HIGHEST ICA: 83
OHS CV US CAROTID LEFT HIGHEST EDV: 28
RIGHT ARM DIASTOLIC BLOOD PRESSURE: 58 MMHG
RIGHT ARM SYSTOLIC BLOOD PRESSURE: 122 MMHG
RIGHT CBA DIAS: 12 CM/S
RIGHT CBA SYS: 52 CM/S
RIGHT CCA DIST DIAS: 8 CM/S
RIGHT CCA DIST SYS: 47 CM/S
RIGHT CCA MID DIAS: 6 CM/S
RIGHT CCA MID SYS: 45 CM/S
RIGHT CCA PROX DIAS: 7 CM/S
RIGHT CCA PROX SYS: 37 CM/S
RIGHT ECA DIAS: 6 CM/S
RIGHT ECA SYS: 95 CM/S
RIGHT ICA DIST DIAS: 13 CM/S
RIGHT ICA DIST SYS: 67 CM/S
RIGHT ICA MID DIAS: 11 CM/S
RIGHT ICA MID SYS: 66 CM/S
RIGHT ICA PROX DIAS: 14 CM/S
RIGHT ICA PROX SYS: 83 CM/S
RIGHT VERTEBRAL DIAS: 8 CM/S
RIGHT VERTEBRAL SYS: 37 CM/S

## 2022-11-03 PROCEDURE — 1159F MED LIST DOCD IN RCRD: CPT | Mod: CPTII,S$GLB,,

## 2022-11-03 PROCEDURE — 99499 UNLISTED E&M SERVICE: CPT | Mod: S$GLB,,,

## 2022-11-03 PROCEDURE — 99214 OFFICE O/P EST MOD 30 MIN: CPT | Mod: S$GLB,,,

## 2022-11-03 PROCEDURE — 1126F AMNT PAIN NOTED NONE PRSNT: CPT | Mod: CPTII,S$GLB,,

## 2022-11-03 PROCEDURE — 1126F PR PAIN SEVERITY QUANTIFIED, NO PAIN PRESENT: ICD-10-PCS | Mod: CPTII,S$GLB,,

## 2022-11-03 PROCEDURE — 93880 CV US DOPPLER CAROTID (CUPID ONLY): ICD-10-PCS | Mod: 26,,, | Performed by: INTERNAL MEDICINE

## 2022-11-03 PROCEDURE — 1159F PR MEDICATION LIST DOCUMENTED IN MEDICAL RECORD: ICD-10-PCS | Mod: CPTII,S$GLB,,

## 2022-11-03 PROCEDURE — 3288F PR FALLS RISK ASSESSMENT DOCUMENTED: ICD-10-PCS | Mod: CPTII,S$GLB,,

## 2022-11-03 PROCEDURE — 99999 PR PBB SHADOW E&M-EST. PATIENT-LVL III: ICD-10-PCS | Mod: PBBFAC,,,

## 2022-11-03 PROCEDURE — 1101F PR PT FALLS ASSESS DOC 0-1 FALLS W/OUT INJ PAST YR: ICD-10-PCS | Mod: CPTII,S$GLB,,

## 2022-11-03 PROCEDURE — 99999 PR PBB SHADOW E&M-EST. PATIENT-LVL III: CPT | Mod: PBBFAC,,,

## 2022-11-03 PROCEDURE — 99499 RISK ADDL DX/OHS AUDIT: ICD-10-PCS | Mod: S$GLB,,,

## 2022-11-03 PROCEDURE — 93880 EXTRACRANIAL BILAT STUDY: CPT | Mod: 26,,, | Performed by: INTERNAL MEDICINE

## 2022-11-03 PROCEDURE — 1101F PT FALLS ASSESS-DOCD LE1/YR: CPT | Mod: CPTII,S$GLB,,

## 2022-11-03 PROCEDURE — 3288F FALL RISK ASSESSMENT DOCD: CPT | Mod: CPTII,S$GLB,,

## 2022-11-03 PROCEDURE — 99214 PR OFFICE/OUTPT VISIT, EST, LEVL IV, 30-39 MIN: ICD-10-PCS | Mod: S$GLB,,,

## 2022-11-03 PROCEDURE — 93880 EXTRACRANIAL BILAT STUDY: CPT

## 2022-11-03 NOTE — PROGRESS NOTES
Subjective:       Patient ID: Claude T Dupuis is a 89 y.o. male     Chief Complaint: Wound Check    Patient presents with his son for a reevaluation of a left lower extremity wound. Patient follows with Dr. Lewis for PAD. Patient followed by Dr. Kothari- left greater saphenous ablation scheduled for 12/22/22.No complaints at this time. Denies fever, chills, erythema, warmth, or purulent drainage.      9/7/22 CV Toe Pressures:   · Right TBI 0.35, suggestive of severe right lower extremity arterial disease.  · Left TBI 0.14, is suggestive of severe left lower extremity arterial disease.  · Digit waveforms are mildly dampened on the left.    9/7/22 Ultrasound Doppler Arterial:  · There is scattered atherosclerotic plaque throughout the bilateral lower extremity arteries.  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  · Right anterior tibial artery stenosis, greater than 75%.  · Right posterior tibial artery is occluded.  · Right peroneal artery not visualized, possibly occluded.  · Left tibio peroneal trunk artery stenosis, 50-75%.  · Left anterior tibial artery stenosis, greater than 75%.  · Left posterior tibial artery is occluded.    Review of Systems   Constitutional:  Negative for activity change, chills, diaphoresis, fatigue and fever.   Respiratory:  Negative for apnea, chest tightness and shortness of breath.    Cardiovascular:  Positive for leg swelling. Negative for chest pain and palpitations.   Musculoskeletal:  Negative for gait problem and joint swelling.   Skin:  Positive for wound. Negative for pallor and rash.   Neurological:  Positive for light-headedness. Negative for syncope, weakness and numbness.   Psychiatric/Behavioral:  Negative for agitation. The patient is not nervous/anxious.    All other systems reviewed and are negative.    Objective:      Physical Exam  Vitals reviewed.   Constitutional:       General: He is not in acute distress.     Appearance: Normal  appearance.   HENT:      Right Ear: Decreased hearing noted.      Left Ear: Decreased hearing noted.   Cardiovascular:      Rate and Rhythm: Normal rate and regular rhythm.   Pulmonary:      Effort: No respiratory distress.   Musculoskeletal:         General: No swelling.      Right lower leg: Edema present.      Left lower leg: Edema present.   Skin:     General: Skin is warm and dry.      Findings: Wound present. No erythema.          Neurological:      General: No focal deficit present.      Mental Status: He is alert and oriented to person, place, and time.   Psychiatric:         Mood and Affect: Mood normal.         Behavior: Behavior normal.         Thought Content: Thought content normal.         Judgment: Judgment normal.       Assessment:       1. Ulcer of left lower extremity with fat layer exposed    2. Edema of both lower extremities    3. PAD (peripheral artery disease)    4. Acute diastolic heart failure    5. Venous stasis dermatitis of both lower extremities    6. Hypertension, unspecified type           Altered Skin Integrity Left lower;medial Leg (Active)       Altered Skin Integrity Present on Admission:    Side: Left   Orientation: lower;medial   Location: Leg   Wound Number:    Is this injury device related?:    Primary Wound Type:    Description of Altered Skin Integrity:    Ankle-Brachial Index:    Pulses:    Removal Indication and Assessment:    Wound Outcome:    (Retired) Wound Length (cm):    (Retired) Wound Width (cm):    (Retired) Depth (cm):    Wound Description (Comments):    Removal Indications:    Wound Image   11/03/22 0948   Dressing Appearance Dry;Intact;Clean;Moist drainage 11/03/22 0948   Drainage Amount Small 11/03/22 0948   Drainage Characteristics/Odor Yellow 11/03/22 0948   Red (%), Wound Tissue Color 100 % 11/03/22 0948   Periwound Area Intact;Pink;Hemosiderin Staining 11/03/22 0948   Wound Length (cm) 3.5 cm 11/03/22 0948   Wound Width (cm) 1.6 cm 11/03/22 0948   Wound  Depth (cm) 0.1 cm 11/03/22 0948   Wound Volume (cm^3) 0.56 cm^3 11/03/22 0948   Wound Surface Area (cm^2) 5.6 cm^2 11/03/22 0948   Care Cleansed with:;Soap and water 11/03/22 0948   Dressing Applied;Island/border;Silicone 11/03/22 0948       Claude was seen in the clinic room and placed in the supine position on the treatment table.  The dressing was removed.The leg was cleansed with Easi-clense sponges and dried thoroughly.     Plan of Care: Mepilex border dressing to cover the wound.     Plan:     Instructed patient to keep follow up appointments with cardiology and neurology for pre-syncopal symptoms.     Left lower extremity dressed as detailed above.   Patient was instructed to not get the dressings wet and to use cast covers for showering.  Should the dressing become wet, he is to remove it, place another aquacel border dressing to the area. He should then notify this office as soon as possible to have a new dressing applied.    Discussed PAD with patient. Discussed toe pressure, venous ultrasound, and arterial ultrasound results with patient and patient's son. Unable to utilize therapeutic compression due to severe bilateral lower extremity arterial disease. Instructed patient to keep appointments with Dr. Kothari and Dr. Lewis.     Instructed patient to elevate legs whenever sedentary and follow a low sodium diet.       Discussed nutrition and the role of protein in wound healing with the patient. Instructed patient to continue protein regimen for wound healing.     Instructed patient to continue taking bumetanide as prescribed.     Written and verbal instructions given to patient.  RTC in 1 week    Aneta Olmstead PA-C

## 2022-11-03 NOTE — TELEPHONE ENCOUNTER
"Spoke with pt's son, Anton, who is our head of security here at [facility]. He reports his father has been having near weekly episodes of where he basically "goes out" for 20-30 seconds, puts his head down and then has trouble remembering anything for a few mins following the episodes.    Pt was seen yesterday in Cards by Dr. Lewis and cardiac work-up underway (CUS, Echo, Stress test). Dr. Lewis recommended being seen by Neuro as well for TIA, Sz consideration. Besides being hard of hearing and using a walker, pt is still quite active and independent along with his wife. Mr. Walton is concerned over these events.    Appt offered/accepted for 11/15/22 @ 9:00am with Dr. Burgos at [facility], [address] [facility] clinic tower. Pt expected to have nuclear camera test same morning at 7:40am. Will report to Neuro floor after that appt.   "

## 2022-11-03 NOTE — TELEPHONE ENCOUNTER
----- Message from Cem Salter sent at 11/2/2022  2:25 PM CDT -----  Regarding: appt-returning missed call to schedule  Contact: Anton (son) @ [phone]  Caller, Anton Cox (son) whom is the -Chief of Security and Emergency here with [facility] is returning a missed call from someone in the office to schedule pt for an appt. I am unsure who may have called him; no telephone message logged.    Caller is asking for a return call to schedule pt; caller states that he wants to have pt looked at for spells that he has been having. Thanks.

## 2022-11-09 ENCOUNTER — OFFICE VISIT (OUTPATIENT)
Dept: WOUND CARE | Facility: CLINIC | Age: 87
End: 2022-11-09
Payer: MEDICARE

## 2022-11-09 VITALS
DIASTOLIC BLOOD PRESSURE: 61 MMHG | WEIGHT: 177.69 LBS | BODY MASS INDEX: 27.89 KG/M2 | HEIGHT: 67 IN | TEMPERATURE: 99 F | SYSTOLIC BLOOD PRESSURE: 137 MMHG | HEART RATE: 56 BPM

## 2022-11-09 DIAGNOSIS — I87.2 VENOUS STASIS DERMATITIS OF BOTH LOWER EXTREMITIES: ICD-10-CM

## 2022-11-09 DIAGNOSIS — I10 HYPERTENSION, UNSPECIFIED TYPE: ICD-10-CM

## 2022-11-09 DIAGNOSIS — I50.31 ACUTE DIASTOLIC HEART FAILURE: ICD-10-CM

## 2022-11-09 DIAGNOSIS — L97.922 ULCER OF LEFT LOWER EXTREMITY WITH FAT LAYER EXPOSED: Primary | ICD-10-CM

## 2022-11-09 DIAGNOSIS — I73.9 PAD (PERIPHERAL ARTERY DISEASE): ICD-10-CM

## 2022-11-09 DIAGNOSIS — R60.0 EDEMA OF BOTH LOWER EXTREMITIES: ICD-10-CM

## 2022-11-09 PROCEDURE — 1101F PT FALLS ASSESS-DOCD LE1/YR: CPT | Mod: CPTII,S$GLB,,

## 2022-11-09 PROCEDURE — 3288F PR FALLS RISK ASSESSMENT DOCUMENTED: ICD-10-PCS | Mod: CPTII,S$GLB,,

## 2022-11-09 PROCEDURE — 1126F AMNT PAIN NOTED NONE PRSNT: CPT | Mod: CPTII,S$GLB,,

## 2022-11-09 PROCEDURE — 1159F MED LIST DOCD IN RCRD: CPT | Mod: CPTII,S$GLB,,

## 2022-11-09 PROCEDURE — 99999 PR PBB SHADOW E&M-EST. PATIENT-LVL III: ICD-10-PCS | Mod: PBBFAC,,,

## 2022-11-09 PROCEDURE — 3288F FALL RISK ASSESSMENT DOCD: CPT | Mod: CPTII,S$GLB,,

## 2022-11-09 PROCEDURE — 99499 RISK ADDL DX/OHS AUDIT: ICD-10-PCS | Mod: S$GLB,,,

## 2022-11-09 PROCEDURE — 99499 UNLISTED E&M SERVICE: CPT | Mod: S$GLB,,,

## 2022-11-09 PROCEDURE — 1101F PR PT FALLS ASSESS DOC 0-1 FALLS W/OUT INJ PAST YR: ICD-10-PCS | Mod: CPTII,S$GLB,,

## 2022-11-09 PROCEDURE — 99999 PR PBB SHADOW E&M-EST. PATIENT-LVL III: CPT | Mod: PBBFAC,,,

## 2022-11-09 PROCEDURE — 99214 OFFICE O/P EST MOD 30 MIN: CPT | Mod: S$GLB,,,

## 2022-11-09 PROCEDURE — 1126F PR PAIN SEVERITY QUANTIFIED, NO PAIN PRESENT: ICD-10-PCS | Mod: CPTII,S$GLB,,

## 2022-11-09 PROCEDURE — 99214 PR OFFICE/OUTPT VISIT, EST, LEVL IV, 30-39 MIN: ICD-10-PCS | Mod: S$GLB,,,

## 2022-11-09 PROCEDURE — 1159F PR MEDICATION LIST DOCUMENTED IN MEDICAL RECORD: ICD-10-PCS | Mod: CPTII,S$GLB,,

## 2022-11-09 NOTE — PROGRESS NOTES
Subjective:       Patient ID: Claude T Dupuis is a 89 y.o. male     Chief Complaint: Wound Check    Patient presents with his son for a reevaluation of a left lower extremity wound. Patient follows with Dr. Lewis for PAD. Patient followed by Dr. Kothari- left greater saphenous ablation scheduled for 12/22/22.No complaints at this time. Denies fever, chills, erythema, warmth, or purulent drainage.      9/7/22 CV Toe Pressures:   · Right TBI 0.35, suggestive of severe right lower extremity arterial disease.  · Left TBI 0.14, is suggestive of severe left lower extremity arterial disease.  · Digit waveforms are mildly dampened on the left.    9/7/22 Ultrasound Doppler Arterial:  · There is scattered atherosclerotic plaque throughout the bilateral lower extremity arteries.  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  · Right anterior tibial artery stenosis, greater than 75%.  · Right posterior tibial artery is occluded.  · Right peroneal artery not visualized, possibly occluded.  · Left tibio peroneal trunk artery stenosis, 50-75%.  · Left anterior tibial artery stenosis, greater than 75%.  · Left posterior tibial artery is occluded.    Review of Systems   Constitutional:  Negative for activity change, chills, diaphoresis, fatigue and fever.   Respiratory:  Negative for apnea, chest tightness and shortness of breath.    Cardiovascular:  Positive for leg swelling. Negative for chest pain and palpitations.   Musculoskeletal:  Negative for gait problem and joint swelling.   Skin:  Positive for wound. Negative for pallor and rash.   Neurological:  Positive for light-headedness. Negative for syncope, weakness and numbness.   Psychiatric/Behavioral:  Negative for agitation. The patient is not nervous/anxious.    All other systems reviewed and are negative.    Objective:      Physical Exam  Vitals reviewed.   Constitutional:       General: He is not in acute distress.     Appearance: Normal  appearance.   HENT:      Right Ear: Decreased hearing noted.      Left Ear: Decreased hearing noted.   Cardiovascular:      Rate and Rhythm: Normal rate and regular rhythm.   Pulmonary:      Effort: No respiratory distress.   Musculoskeletal:         General: No swelling.      Right lower leg: Edema present.      Left lower leg: Edema present.   Skin:     General: Skin is warm and dry.      Findings: Wound present. No erythema.          Neurological:      General: No focal deficit present.      Mental Status: He is alert and oriented to person, place, and time.   Psychiatric:         Mood and Affect: Mood normal.         Behavior: Behavior normal.         Thought Content: Thought content normal.         Judgment: Judgment normal.       Assessment:       1. Ulcer of left lower extremity with fat layer exposed    2. Edema of both lower extremities    3. PAD (peripheral artery disease)    4. Acute diastolic heart failure    5. Venous stasis dermatitis of both lower extremities    6. Hypertension, unspecified type           Altered Skin Integrity Left lower;medial Leg (Active)       Altered Skin Integrity Present on Admission:    Side: Left   Orientation: lower;medial   Location: Leg   Wound Number:    Is this injury device related?:    Primary Wound Type:    Description of Altered Skin Integrity:    Ankle-Brachial Index:    Pulses:    Removal Indication and Assessment:    Wound Outcome:    (Retired) Wound Length (cm):    (Retired) Wound Width (cm):    (Retired) Depth (cm):    Wound Description (Comments):    Removal Indications:    Wound Image   11/09/22 0845   Dressing Appearance Dry;Intact;Clean;Moist drainage 11/09/22 0845   Drainage Amount Small 11/09/22 0845   Drainage Characteristics/Odor [place];Yellow 11/09/22 0845   Red (%), Wound Tissue Color 100 % 11/09/22 0845   Periwound Area Intact;Edematous;Pink 11/09/22 0845   Wound Length (cm) 0.9 cm 11/09/22 0845   Wound Width (cm) 0.5 cm 11/09/22 0845   Wound Depth (cm)  0.1 cm 11/09/22 0845   Wound Volume (cm^3) 0.045 cm^3 11/09/22 0845   Wound Surface Area (cm^2) 0.45 cm^2 11/09/22 0845   Care Cleansed with:;Wound cleanser 11/09/22 0845   Dressing Applied;Silicone;Island/border 11/09/22 0845       Claude was seen in the clinic room and placed in the supine position on the treatment table.  The dressing was removed.The leg was cleansed with Easi-clense sponges and dried thoroughly.     Plan of Care: Mepilex border dressing to cover the wound.     Plan:     Instructed patient to keep follow up appointments with cardiology and neurology for pre-syncopal symptoms.     Left lower extremity dressed as detailed above.   Patient was instructed to not get the dressings wet and to use cast covers for showering.  Should the dressing become wet, he is to remove it, place another aquacel border dressing to the area. He should then notify this office as soon as possible to have a new dressing applied.    Discussed PAD with patient. Discussed toe pressure, venous ultrasound, and arterial ultrasound results with patient and patient's son. Unable to utilize therapeutic compression due to severe bilateral lower extremity arterial disease. Instructed patient to keep appointments with Dr. Kothari and Dr. Lewis.     Instructed patient to elevate legs whenever sedentary and follow a low sodium diet.       Discussed nutrition and the role of protein in wound healing with the patient. Instructed patient to continue protein regimen for wound healing.     Instructed patient to continue taking bumetanide as prescribed.     Written and verbal instructions given to patient.  RTC in 1 week    Aneta Olmstead PA-C

## 2022-11-11 ENCOUNTER — CLINICAL SUPPORT (OUTPATIENT)
Dept: CARDIOLOGY | Facility: HOSPITAL | Age: 87
End: 2022-11-11
Attending: INTERNAL MEDICINE
Payer: MEDICARE

## 2022-11-11 ENCOUNTER — HOSPITAL ENCOUNTER (OUTPATIENT)
Dept: CARDIOLOGY | Facility: HOSPITAL | Age: 87
Discharge: HOME | End: 2022-11-11
Attending: INTERNAL MEDICINE
Payer: MEDICARE

## 2022-11-11 VITALS
DIASTOLIC BLOOD PRESSURE: 61 MMHG | HEART RATE: 70 BPM | BODY MASS INDEX: 28.25 KG/M2 | SYSTOLIC BLOOD PRESSURE: 137 MMHG | WEIGHT: 180 LBS | HEIGHT: 67 IN

## 2022-11-11 DIAGNOSIS — M79.601 RIGHT ARM PAIN: ICD-10-CM

## 2022-11-11 DIAGNOSIS — G45.9 TRANSIENT CEREBRAL ISCHEMIA, UNSPECIFIED TYPE: ICD-10-CM

## 2022-11-11 DIAGNOSIS — Z95.1 HX OF CABG: ICD-10-CM

## 2022-11-11 DIAGNOSIS — I25.10 CORONARY ARTERY DISEASE INVOLVING NATIVE CORONARY ARTERY OF NATIVE HEART WITHOUT ANGINA PECTORIS: ICD-10-CM

## 2022-11-11 LAB
ASCENDING AORTA: 3.7 CM
AV INDEX (PROSTH): 0.8
AV MEAN GRADIENT: 3 MMHG
AV PEAK GRADIENT: 5 MMHG
AV VALVE AREA: 3 CM2
AV VELOCITY RATIO: 0.69
BSA FOR ECHO PROCEDURE: 1.96 M2
CV ECHO LV RWT: 0.39 CM
DOP CALC AO PEAK VEL: 1.15 M/S
DOP CALC AO VTI: 23.64 CM
DOP CALC LVOT AREA: 3.8 CM2
DOP CALC LVOT DIAMETER: 2.19 CM
DOP CALC LVOT PEAK VEL: 0.79 M/S
DOP CALC LVOT STROKE VOLUME: 71.01 CM3
DOP CALCLVOT PEAK VEL VTI: 18.86 CM
E WAVE DECELERATION TIME: 284.74 MSEC
E/A RATIO: 0.45
E/E' RATIO: 6.83 M/S
ECHO LV POSTERIOR WALL: 0.79 CM (ref 0.6–1.1)
EJECTION FRACTION: 65 %
FRACTIONAL SHORTENING: 25 % (ref 28–44)
INTERVENTRICULAR SEPTUM: 1.11 CM (ref 0.6–1.1)
IVRT: 95.15 MSEC
LA MAJOR: 5.43 CM
LA MINOR: 5.73 CM
LA WIDTH: 4.39 CM
LEFT ATRIUM SIZE: 3.44 CM
LEFT ATRIUM VOLUME INDEX MOD: 38.8 ML/M2
LEFT ATRIUM VOLUME INDEX: 37.1 ML/M2
LEFT ATRIUM VOLUME MOD: 74.91 CM3
LEFT ATRIUM VOLUME: 71.58 CM3
LEFT INTERNAL DIMENSION IN SYSTOLE: 3.06 CM (ref 2.1–4)
LEFT VENTRICLE DIASTOLIC VOLUME INDEX: 37.76 ML/M2
LEFT VENTRICLE DIASTOLIC VOLUME: 72.87 ML
LEFT VENTRICLE MASS INDEX: 63 G/M2
LEFT VENTRICLE SYSTOLIC VOLUME INDEX: 19.1 ML/M2
LEFT VENTRICLE SYSTOLIC VOLUME: 36.79 ML
LEFT VENTRICULAR INTERNAL DIMENSION IN DIASTOLE: 4.07 CM (ref 3.5–6)
LEFT VENTRICULAR MASS: 121.54 G
LV LATERAL E/E' RATIO: 5.13 M/S
LV SEPTAL E/E' RATIO: 10.25 M/S
MV A" WAVE DURATION": 22.45 MSEC
MV PEAK A VEL: 0.91 M/S
MV PEAK E VEL: 0.41 M/S
MV STENOSIS PRESSURE HALF TIME: 82.57 MS
MV VALVE AREA P 1/2 METHOD: 2.66 CM2
PISA TR MAX VEL: 2.27 M/S
PULM VEIN S/D RATIO: 1.54
PV PEAK D VEL: 0.26 M/S
PV PEAK S VEL: 0.4 M/S
RA MAJOR: 3.89 CM
RA WIDTH: 3.12 CM
RV TISSUE DOPPLER FREE WALL SYSTOLIC VELOCITY 1 (APICAL 4 CHAMBER VIEW): 6.57 CM/S
SINUS: 3.62 CM
STJ: 2.94 CM
TDI LATERAL: 0.08 M/S
TDI SEPTAL: 0.04 M/S
TDI: 0.06 M/S
TR MAX PG: 21 MMHG
TRICUSPID ANNULAR PLANE SYSTOLIC EXCURSION: 0.89 CM

## 2022-11-11 PROCEDURE — 93272 ECG/REVIEW INTERPRET ONLY: CPT | Mod: ,,, | Performed by: INTERNAL MEDICINE

## 2022-11-11 PROCEDURE — 93306 TTE W/DOPPLER COMPLETE: CPT | Mod: 26,,, | Performed by: INTERNAL MEDICINE

## 2022-11-11 PROCEDURE — 25500020 PHARM REV CODE 255: Performed by: INTERNAL MEDICINE

## 2022-11-11 PROCEDURE — C8929 TTE W OR WO FOL WCON,DOPPLER: HCPCS

## 2022-11-11 PROCEDURE — 93306 ECHO (CUPID ONLY): ICD-10-PCS | Mod: 26,,, | Performed by: INTERNAL MEDICINE

## 2022-11-11 PROCEDURE — 93272 CARDIAC EVENT MONITOR (CUPID ONLY): ICD-10-PCS | Mod: ,,, | Performed by: INTERNAL MEDICINE

## 2022-11-11 PROCEDURE — 93270 REMOTE 30 DAY ECG REV/REPORT: CPT

## 2022-11-11 RX ADMIN — HUMAN ALBUMIN MICROSPHERES AND PERFLUTREN 0.66 MG: 10; .22 INJECTION, SOLUTION INTRAVENOUS at 11:11

## 2022-11-11 NOTE — PROGRESS NOTES
Procedure explained. 22 g sl started in left forearm for optison use. Optison given ivp via sl for imaging by marie larsen [facility]. Tolerated well. Sl d/rosanna after. Pressure applied.

## 2022-11-15 ENCOUNTER — OFFICE VISIT (OUTPATIENT)
Dept: NEUROLOGY | Facility: CLINIC | Age: 87
End: 2022-11-15
Payer: MEDICARE

## 2022-11-15 ENCOUNTER — PATIENT MESSAGE (OUTPATIENT)
Dept: NEUROLOGY | Facility: CLINIC | Age: 87
End: 2022-11-15

## 2022-11-15 ENCOUNTER — OFFICE VISIT (OUTPATIENT)
Dept: WOUND CARE | Facility: CLINIC | Age: 87
End: 2022-11-15
Payer: MEDICARE

## 2022-11-15 ENCOUNTER — LAB VISIT (OUTPATIENT)
Dept: LAB | Facility: HOSPITAL | Age: 87
End: 2022-11-15
Attending: PSYCHIATRY & NEUROLOGY
Payer: MEDICARE

## 2022-11-15 ENCOUNTER — PATIENT MESSAGE (OUTPATIENT)
Dept: CARDIOLOGY | Facility: CLINIC | Age: 87
End: 2022-11-15
Payer: MEDICARE

## 2022-11-15 VITALS
HEART RATE: 49 BPM | BODY MASS INDEX: 28.19 KG/M2 | SYSTOLIC BLOOD PRESSURE: 139 MMHG | HEIGHT: 67 IN | DIASTOLIC BLOOD PRESSURE: 80 MMHG

## 2022-11-15 VITALS
HEART RATE: 51 BPM | SYSTOLIC BLOOD PRESSURE: 148 MMHG | TEMPERATURE: 98 F | HEIGHT: 67 IN | WEIGHT: 177.94 LBS | BODY MASS INDEX: 27.93 KG/M2 | DIASTOLIC BLOOD PRESSURE: 66 MMHG

## 2022-11-15 DIAGNOSIS — G60.3 IDIOPATHIC PROGRESSIVE NEUROPATHY: ICD-10-CM

## 2022-11-15 DIAGNOSIS — I10 HYPERTENSION, UNSPECIFIED TYPE: ICD-10-CM

## 2022-11-15 DIAGNOSIS — I87.2 VENOUS STASIS DERMATITIS OF BOTH LOWER EXTREMITIES: ICD-10-CM

## 2022-11-15 DIAGNOSIS — R56.9 SEIZURE: ICD-10-CM

## 2022-11-15 DIAGNOSIS — L97.922 ULCER OF LEFT LOWER EXTREMITY WITH FAT LAYER EXPOSED: Primary | ICD-10-CM

## 2022-11-15 DIAGNOSIS — R41.89 COGNITIVE DECLINE: ICD-10-CM

## 2022-11-15 DIAGNOSIS — R55 RECURRENT SYNCOPE: Primary | ICD-10-CM

## 2022-11-15 DIAGNOSIS — G62.9 POLYNEUROPATHY: ICD-10-CM

## 2022-11-15 DIAGNOSIS — G45.1 HEMISPHERIC CAROTID ARTERY SYNDROME: ICD-10-CM

## 2022-11-15 DIAGNOSIS — R60.0 EDEMA OF BOTH LOWER EXTREMITIES: ICD-10-CM

## 2022-11-15 DIAGNOSIS — R29.818 TRANSIENT NEUROLOGICAL SYMPTOMS: ICD-10-CM

## 2022-11-15 DIAGNOSIS — E13.9 DIABETES 1.5, MANAGED AS TYPE 2: ICD-10-CM

## 2022-11-15 DIAGNOSIS — I73.9 PAD (PERIPHERAL ARTERY DISEASE): ICD-10-CM

## 2022-11-15 DIAGNOSIS — R55 SYNCOPE AND COLLAPSE: ICD-10-CM

## 2022-11-15 DIAGNOSIS — I50.31 ACUTE DIASTOLIC HEART FAILURE: ICD-10-CM

## 2022-11-15 PROBLEM — E11.9 TYPE 2 DIABETES MELLITUS WITHOUT COMPLICATIONS: Status: ACTIVE | Noted: 2022-11-15

## 2022-11-15 LAB
ESTIMATED AVG GLUCOSE: 169 MG/DL (ref 68–131)
HBA1C MFR BLD: 7.5 % (ref 4–5.6)
T4 FREE SERPL-MCNC: 1.47 NG/DL (ref 0.71–1.51)
TSH SERPL DL<=0.005 MIU/L-ACNC: 0.07 UIU/ML (ref 0.4–4)
VIT B12 SERPL-MCNC: 366 PG/ML (ref 210–950)

## 2022-11-15 PROCEDURE — 1101F PR PT FALLS ASSESS DOC 0-1 FALLS W/OUT INJ PAST YR: ICD-10-PCS | Mod: CPTII,S$GLB,, | Performed by: PSYCHIATRY & NEUROLOGY

## 2022-11-15 PROCEDURE — 1159F MED LIST DOCD IN RCRD: CPT | Mod: CPTII,S$GLB,,

## 2022-11-15 PROCEDURE — 1101F PT FALLS ASSESS-DOCD LE1/YR: CPT | Mod: CPTII,S$GLB,, | Performed by: PSYCHIATRY & NEUROLOGY

## 2022-11-15 PROCEDURE — 99499 UNLISTED E&M SERVICE: CPT | Mod: S$GLB,,, | Performed by: PSYCHIATRY & NEUROLOGY

## 2022-11-15 PROCEDURE — 11042 DBRDMT SUBQ TIS 1ST 20SQCM/<: CPT | Mod: S$GLB,,,

## 2022-11-15 PROCEDURE — 3288F FALL RISK ASSESSMENT DOCD: CPT | Mod: CPTII,S$GLB,,

## 2022-11-15 PROCEDURE — 3288F FALL RISK ASSESSMENT DOCD: CPT | Mod: CPTII,S$GLB,, | Performed by: PSYCHIATRY & NEUROLOGY

## 2022-11-15 PROCEDURE — 1126F AMNT PAIN NOTED NONE PRSNT: CPT | Mod: CPTII,S$GLB,,

## 2022-11-15 PROCEDURE — 99499 UNLISTED E&M SERVICE: CPT | Mod: S$GLB,,,

## 2022-11-15 PROCEDURE — 83921 ORGANIC ACID SINGLE QUANT: CPT | Performed by: PSYCHIATRY & NEUROLOGY

## 2022-11-15 PROCEDURE — 99999 PR PBB SHADOW E&M-EST. PATIENT-LVL III: CPT | Mod: PBBFAC,,,

## 2022-11-15 PROCEDURE — 83036 HEMOGLOBIN GLYCOSYLATED A1C: CPT | Performed by: PSYCHIATRY & NEUROLOGY

## 2022-11-15 PROCEDURE — 1101F PR PT FALLS ASSESS DOC 0-1 FALLS W/OUT INJ PAST YR: ICD-10-PCS | Mod: CPTII,S$GLB,,

## 2022-11-15 PROCEDURE — 11042 DEBRIDEMENT: ICD-10-PCS | Mod: S$GLB,,,

## 2022-11-15 PROCEDURE — 3288F PR FALLS RISK ASSESSMENT DOCUMENTED: ICD-10-PCS | Mod: CPTII,S$GLB,, | Performed by: PSYCHIATRY & NEUROLOGY

## 2022-11-15 PROCEDURE — 99214 OFFICE O/P EST MOD 30 MIN: CPT | Mod: 25,S$GLB,,

## 2022-11-15 PROCEDURE — 99499 RISK ADDL DX/OHS AUDIT: ICD-10-PCS | Mod: S$GLB,,, | Performed by: PSYCHIATRY & NEUROLOGY

## 2022-11-15 PROCEDURE — 36415 COLL VENOUS BLD VENIPUNCTURE: CPT | Performed by: PSYCHIATRY & NEUROLOGY

## 2022-11-15 PROCEDURE — 99214 PR OFFICE/OUTPT VISIT, EST, LEVL IV, 30-39 MIN: ICD-10-PCS | Mod: 25,S$GLB,,

## 2022-11-15 PROCEDURE — 1159F PR MEDICATION LIST DOCUMENTED IN MEDICAL RECORD: ICD-10-PCS | Mod: CPTII,S$GLB,,

## 2022-11-15 PROCEDURE — 3288F PR FALLS RISK ASSESSMENT DOCUMENTED: ICD-10-PCS | Mod: CPTII,S$GLB,,

## 2022-11-15 PROCEDURE — 1101F PT FALLS ASSESS-DOCD LE1/YR: CPT | Mod: CPTII,S$GLB,,

## 2022-11-15 PROCEDURE — 99999 PR PBB SHADOW E&M-EST. PATIENT-LVL III: CPT | Mod: PBBFAC,,, | Performed by: PSYCHIATRY & NEUROLOGY

## 2022-11-15 PROCEDURE — 1126F AMNT PAIN NOTED NONE PRSNT: CPT | Mod: CPTII,S$GLB,, | Performed by: PSYCHIATRY & NEUROLOGY

## 2022-11-15 PROCEDURE — 1126F PR PAIN SEVERITY QUANTIFIED, NO PAIN PRESENT: ICD-10-PCS | Mod: CPTII,S$GLB,, | Performed by: PSYCHIATRY & NEUROLOGY

## 2022-11-15 PROCEDURE — 99205 PR OFFICE/OUTPT VISIT, NEW, LEVL V, 60-74 MIN: ICD-10-PCS | Mod: S$GLB,,, | Performed by: PSYCHIATRY & NEUROLOGY

## 2022-11-15 PROCEDURE — 84439 ASSAY OF FREE THYROXINE: CPT | Performed by: PSYCHIATRY & NEUROLOGY

## 2022-11-15 PROCEDURE — 99499 RISK ADDL DX/OHS AUDIT: ICD-10-PCS | Mod: S$GLB,,,

## 2022-11-15 PROCEDURE — 99999 PR PBB SHADOW E&M-EST. PATIENT-LVL III: ICD-10-PCS | Mod: PBBFAC,,,

## 2022-11-15 PROCEDURE — 84443 ASSAY THYROID STIM HORMONE: CPT | Performed by: PSYCHIATRY & NEUROLOGY

## 2022-11-15 PROCEDURE — 82607 VITAMIN B-12: CPT | Performed by: PSYCHIATRY & NEUROLOGY

## 2022-11-15 PROCEDURE — 99205 OFFICE O/P NEW HI 60 MIN: CPT | Mod: S$GLB,,, | Performed by: PSYCHIATRY & NEUROLOGY

## 2022-11-15 PROCEDURE — 1126F PR PAIN SEVERITY QUANTIFIED, NO PAIN PRESENT: ICD-10-PCS | Mod: CPTII,S$GLB,,

## 2022-11-15 PROCEDURE — 99999 PR PBB SHADOW E&M-EST. PATIENT-LVL III: ICD-10-PCS | Mod: PBBFAC,,, | Performed by: PSYCHIATRY & NEUROLOGY

## 2022-11-15 RX ORDER — ISOSORBIDE MONONITRATE 30 MG/1
30 TABLET, EXTENDED RELEASE ORAL DAILY
Qty: 30 TABLET | Refills: 3 | Status: SHIPPED | OUTPATIENT
Start: 2022-11-15 | End: 2022-12-28 | Stop reason: SDUPTHER

## 2022-11-15 RX ORDER — ISOSORBIDE MONONITRATE 30 MG/1
30 TABLET, EXTENDED RELEASE ORAL DAILY
Qty: 30 TABLET | Refills: 3 | Status: SHIPPED | OUTPATIENT
Start: 2022-11-15 | End: 2022-11-15 | Stop reason: SDUPTHER

## 2022-11-15 NOTE — PROGRESS NOTES
Outpatient Neurology Consultation    Requesting physician: Dr. Lewis    Impression:  Episodic pre-syncope:  DDx includes cardiogenic and seizures.  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  Will also check TSH and B12  Dementia  Diabetes - newly diagnosed    Plan:  EEG  MRI/MRA brain & MRA neck  F/U on cardiac event monitor result  Continue Xarelto (for SVT) then ASA  HgA1c, TSH, B12  Consider empiric trial of Keppra 500mg bid if event monitor is negative  Consider statin pending above  Consider donepezil at f/u  F/U 4 weeks     Problem List Items Addressed This Visit          Unprioritized    Seizure     Other Visit Diagnoses       Recurrent syncope    -  Primary    Relevant Orders    MRA Brain without contrast    MRA Neck without contrast    EEG,w/awake & asleep record    MRI Brain Without Contrast    Hemispheric carotid artery syndrome        Relevant Orders    MRA Brain without contrast    MRA Neck without contrast    Transient neurological symptoms        Relevant Orders    EEG,w/awake & asleep record    Syncope and collapse        Relevant Orders    MRI Brain Without Contrast    Cognitive decline        Relevant Orders    TSH    T4, FREE    Vitamin B12    Hemoglobin A1C    METHYLMALONIC ACID, SERUM    Polyneuropathy        Relevant Orders    TSH    T4, FREE    Vitamin B12    Hemoglobin A1C    METHYLMALONIC ACID, SERUM    Diabetes 1.5, managed as type 2        Relevant Orders    TSH    T4, FREE    Vitamin B12    Hemoglobin A1C    METHYLMALONIC ACID, SERUM    Idiopathic progressive neuropathy        Relevant Orders    Vitamin B12            CC:  possible seizures    HPI:  89 y/o M referred by Dr. Lewis for possible seizures.  He presents with his son.  Over at least a month, he has been having episodes of pre-syncope preceded by an unusual sensation in the RUE.  Spells are occurring 1-2 times  per week.  There is about 20mins of post-ictal confusion.  One episode was witnessed by his son who reports no abnormal motor activity.     Carotid U/S shows <50% bilateral athero.  Echo showed mild LAE.  Cardiac event monitor is in place; a single episode was caught.    Past Medical History:   Diagnosis Date    Cancer     Colon cancer     Coronary artery disease     Guillain-[place] syndrome     Hx of CABG 09/07/2022    Hypertension     PAD (peripheral artery disease) 09/28/2022      Past Surgical History:   Procedure Laterality Date    VEIN BYPASS SURGERY        Outpatient Medications Marked as Taking for the 11/15/22 encounter (Office Visit) with Andry Burgos MD   Medication Sig Dispense Refill    aspirin 325 MG tablet Take 325 mg by mouth once daily.      bumetanide (BUMEX) 0.5 MG Tab Take 1 tablet (0.5 mg total) by mouth 2 (two) times a day. (Patient taking differently: Take 0.5 mg by mouth once daily.) 180 tablet 3    isosorbide mononitrate (IMDUR) 30 MG 24 hr tablet Take 1 tablet (30 mg total) by mouth once daily. 30 tablet 3    levothyroxine (SYNTHROID) 100 MCG tablet Take 100 mcg by mouth once daily.      lisinopril (PRINIVIL,ZESTRIL) 20 MG tablet Take 20 mg by mouth once daily.      metoprolol tartrate (LOPRESSOR) 100 MG tablet Take 100 mg by mouth 2 (two) times daily.        Review of patient's allergies indicates:  No Known Allergies   Family History   Problem Relation Age of Onset    Diabetes Mother       Social History     Socioeconomic History    Marital status:    Tobacco Use    Smoking status: Never    Smokeless tobacco: Never   Substance and Sexual Activity    Alcohol use: No    Drug use: No    Sexual activity: Never     Review Of Systems:  General: Negative for fever   HENT: Negative for tinnitus, nose bleeds, neck stiffness   Cardiac Negative for palpitations   Vascular: Negative for easy bruising   Pulmonary: Negative for cough   Gastrointestinal: Negative for constipation  "  Urinary: Negative for incomplete bladder emptying   Musculoskeletal: Negative for muscle aches   Neurological: As above. Otherwise negative for abnormal movements   Psychiatric:  Negative for depression     /80   Pulse (!) 49   Ht 5' 7" (1.702 m)   BMI 28.19 kg/m²    Well developed, well nourished male  Extremities: no edema    Mental status:   Awake, alert and appropriately oriented   Normal remote memory; recalls 2/5 at 5 min   Normal attention and concentration   Normal speech and language   Normal fund of knowledge   No extinction  Cranial nerves:   Funduscopic - cannot visualize L   PERRLA   EOMF without nystagmus   VFF   Normal facial sensation   Facial movements - poss min tilt R inferiorly   Decreased hearing bilaterally   Palate elevates symmetrically   Normal SCM and trapezius strength   Tongue midline  Motor:   No pronator drift   Normal muscle tone and power except distal BUE weakness and 4/5 B IP and distal LEs   Decreased bulk, especially distally   No abnormal movements  Sensory   Intact to LT; temp gradient in LEs  DTRs   Arelfexia in LEs; trace UEs   Plantar responses are flexor bilaterally  Coordination   Intact to FNF  Gait   NT    Data Reviewed:    Dr. Lewis's note  U/S, echo     CMP  Sodium   Date Value Ref Range Status   10/26/2022 141 136 - 145 mmol/L Final     Potassium   Date Value Ref Range Status   10/26/2022 3.7 3.5 - 5.1 mmol/L Final     Chloride   Date Value Ref Range Status   10/26/2022 101 95 - 110 mmol/L Final     CO2   Date Value Ref Range Status   10/26/2022 28 23 - 29 mmol/L Final     Glucose   Date Value Ref Range Status   10/26/2022 156 (H) 70 - 110 mg/dL Final     BUN   Date Value Ref Range Status   10/26/2022 20 10 - 30 mg/dL Final     Creatinine   Date Value Ref Range Status   10/26/2022 1.1 0.5 - 1.4 mg/dL Final     Calcium   Date Value Ref Range Status   10/26/2022 9.5 8.7 - 10.5 mg/dL Final     Total Protein   Date Value Ref Range Status   10/26/2022 7.2 6.0 - " 8.4 g/dL Final     Albumin   Date Value Ref Range Status   10/26/2022 3.7 3.5 - 5.2 g/dL Final     Total Bilirubin   Date Value Ref Range Status   10/26/2022 1.1 (H) 0.1 - 1.0 mg/dL Final     Comment:     For infants and newborns, interpretation of results should be based  on gestational age, weight and in agreement with clinical  observations.    Premature Infant recommended reference ranges:  Up to 24 hours.............<8.0 mg/dL  Up to 48 hours............<12.0 mg/dL  3-5 days..................<15.0 mg/dL  6-29 days.................<15.0 mg/dL       Alkaline Phosphatase   Date Value Ref Range Status   10/26/2022 76 55 - 135 U/L Final     AST   Date Value Ref Range Status   10/26/2022 20 10 - 40 U/L Final     ALT   Date Value Ref Range Status   10/26/2022 17 10 - 44 U/L Final     Anion Gap   Date Value Ref Range Status   10/26/2022 12 8 - 16 mmol/L Final     eGFR   Date Value Ref Range Status   10/26/2022 >60.0 >60 mL/min/1.73 m^2 Final     No results found for: WBC, HGB, HCT, MCV, PLT    Lab Results   Component Value Date    LDLCALC 87.2 10/26/2022     72 mins chart review, face to face, documentation    Andry Burgos MD

## 2022-11-15 NOTE — PROGRESS NOTES
Subjective:       Patient ID: Claude T Dupuis is a 89 y.o. male     Chief Complaint: Wound Check    Patient presents with his son for a reevaluation of a left lower extremity wound. Patient follows with Dr. Lewis for PAD. Patient followed by Dr. Kothari- left greater saphenous ablation scheduled for 12/22/22.No complaints at this time. Denies fever, chills, erythema, warmth, or purulent drainage.      9/7/22 CV Toe Pressures:   · Right TBI 0.35, suggestive of severe right lower extremity arterial disease.  · Left TBI 0.14, is suggestive of severe left lower extremity arterial disease.  · Digit waveforms are mildly dampened on the left.    9/7/22 Ultrasound Doppler Arterial:  · There is scattered atherosclerotic plaque throughout the bilateral lower extremity arteries.  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  · Right anterior tibial artery stenosis, greater than 75%.  · Right posterior tibial artery is occluded.  · Right peroneal artery not visualized, possibly occluded.  · Left tibio peroneal trunk artery stenosis, 50-75%.  · Left anterior tibial artery stenosis, greater than 75%.  · Left posterior tibial artery is occluded.    Review of Systems   Constitutional:  Negative for activity change, chills, diaphoresis, fatigue and fever.   Respiratory:  Negative for apnea, chest tightness and shortness of breath.    Cardiovascular:  Positive for leg swelling. Negative for chest pain and palpitations.   Musculoskeletal:  Negative for gait problem and joint swelling.   Skin:  Positive for wound. Negative for pallor and rash.   Neurological:  Positive for light-headedness. Negative for syncope, weakness and numbness.   Psychiatric/Behavioral:  Negative for agitation. The patient is not nervous/anxious.    All other systems reviewed and are negative.    Objective:      Physical Exam  Vitals reviewed.   Constitutional:       General: He is not in acute distress.     Appearance: Normal  appearance.   HENT:      Right Ear: Decreased hearing noted.      Left Ear: Decreased hearing noted.   Cardiovascular:      Rate and Rhythm: Normal rate and regular rhythm.   Pulmonary:      Effort: No respiratory distress.   Musculoskeletal:         General: No swelling.      Right lower leg: Edema present.      Left lower leg: Edema present.   Skin:     General: Skin is warm and dry.      Findings: Wound present. No erythema.          Neurological:      General: No focal deficit present.      Mental Status: He is alert and oriented to person, place, and time.   Psychiatric:         Mood and Affect: Mood normal.         Behavior: Behavior normal.         Thought Content: Thought content normal.         Judgment: Judgment normal.       Assessment:       1. Ulcer of left lower extremity with fat layer exposed    2. Edema of both lower extremities    3. PAD (peripheral artery disease)    4. Acute diastolic heart failure    5. Venous stasis dermatitis of both lower extremities    6. Hypertension, unspecified type             Altered Skin Integrity Left lower;medial Leg (Active)       Altered Skin Integrity Present on Admission:    Side: Left   Orientation: lower;medial   Location: Leg   Wound Number:    Is this injury device related?:    Primary Wound Type:    Description of Altered Skin Integrity:    Ankle-Brachial Index:    Pulses:    Removal Indication and Assessment:    Wound Outcome:    (Retired) Wound Length (cm):    (Retired) Wound Width (cm):    (Retired) Depth (cm):    Wound Description (Comments):    Removal Indications:    Wound Image    11/15/22 1040   Dressing Appearance Dry;Intact;Clean;Moist drainage 11/15/22 1040   Drainage Amount Small 11/15/22 1040   Drainage Characteristics/Odor Serous 11/15/22 1040   Red (%), Wound Tissue Color 50 % 11/15/22 1040   Yellow (%), Wound Tissue Color 50 % 11/15/22 1040   Periwound Area Intact;Pink;Edematous 11/15/22 1040   Wound Length (cm) 0.8 cm 11/15/22 1040   Wound  Width (cm) 0.7 cm 11/15/22 1040   Wound Depth (cm) 0.1 cm 11/15/22 1040   Wound Volume (cm^3) 0.056 cm^3 11/15/22 1040   Wound Surface Area (cm^2) 0.56 cm^2 11/15/22 1040   Care Cleansed with:;Wound cleanser 11/15/22 1040   Dressing Applied;Silicone;Island/border 11/15/22 1040       Claude was seen in the clinic room and placed in the supine position on the treatment table.  The dressing was removed.The leg was cleansed with Easi-clense sponges and dried thoroughly.     Plan of Care: Mepilex border dressing to cover the wound.     Plan:     Instructed patient to keep follow up appointments with cardiology and neurology for pre-syncopal symptoms.     Left lower extremity dressed as detailed above.   Patient was instructed to not get the dressings wet and to use cast covers for showering.  Should the dressing become wet, he is to remove it, place another aquacel border dressing to the area. He should then notify this office as soon as possible to have a new dressing applied.    Discussed PAD with patient. Discussed toe pressure, venous ultrasound, and arterial ultrasound results with patient and patient's son. Unable to utilize therapeutic compression due to severe bilateral lower extremity arterial disease. Instructed patient to keep appointments with Dr. Kothari and Dr. Lewis.     Instructed patient to elevate legs whenever sedentary and follow a low sodium diet.       Discussed nutrition and the role of protein in wound healing with the patient. Instructed patient to continue protein regimen for wound healing.     Instructed patient to continue taking bumetanide as prescribed.     Written and verbal instructions given to patient.  RTC in 1 week    Aneta Olmstead PA-C

## 2022-11-15 NOTE — PROCEDURES
"Debridement    Date/Time: 11/15/2022 9:00 AM  Performed by: Aneta Olmstead PA-C  Authorized by: Aneta Olmstead PA-C     Time out: Immediately prior to procedure a "time out" was called to verify the correct patient, procedure, equipment, support staff and site/side marked as required.    Consent Done?:  Yes (Written)  Local anesthesia used?: Yes    Local anesthetic:  Topical anesthetic (Topical 4% Lidocaine Hydrochloride)    Wound Details:    Location:  Left leg    Type of Debridement:  Excisional       Length (cm):  0.8       Area (sq cm):  0.56       Width (cm):  0.7       Percent Debrided (%):  100       Depth (cm):  0.1       Total Area Debrided (sq cm):  0.56    Depth of debridement:  Subcutaneous tissue    Tissue debrided:  Subcutaneous    Devitalized tissue debrided:  Biofilm, Exudate, Fibrin and Slough    Instruments:  Curette    Bleeding:  Minimal  Hemostasis Achieved: Yes    Method Used:  Pressure  Patient tolerance:  Patient tolerated the procedure well with no immediate complications  "

## 2022-11-18 LAB — METHYLMALONATE SERPL-SCNC: 0.43 UMOL/L

## 2022-11-22 RX ORDER — LEVOTHYROXINE SODIUM 100 UG/1
100 TABLET ORAL DAILY
Qty: 90 TABLET | Refills: 0 | Status: SHIPPED | OUTPATIENT
Start: 2022-11-22 | End: 2022-12-16

## 2022-11-23 ENCOUNTER — OFFICE VISIT (OUTPATIENT)
Dept: WOUND CARE | Facility: CLINIC | Age: 87
End: 2022-11-23
Payer: MEDICARE

## 2022-11-23 VITALS
SYSTOLIC BLOOD PRESSURE: 136 MMHG | HEART RATE: 56 BPM | TEMPERATURE: 98 F | WEIGHT: 173.75 LBS | DIASTOLIC BLOOD PRESSURE: 56 MMHG | BODY MASS INDEX: 27.27 KG/M2 | HEIGHT: 67 IN

## 2022-11-23 DIAGNOSIS — R60.0 EDEMA OF BOTH LOWER EXTREMITIES: Primary | ICD-10-CM

## 2022-11-23 DIAGNOSIS — I73.9 PAD (PERIPHERAL ARTERY DISEASE): ICD-10-CM

## 2022-11-23 PROCEDURE — 3288F PR FALLS RISK ASSESSMENT DOCUMENTED: ICD-10-PCS | Mod: CPTII,S$GLB,,

## 2022-11-23 PROCEDURE — 1159F PR MEDICATION LIST DOCUMENTED IN MEDICAL RECORD: ICD-10-PCS | Mod: CPTII,S$GLB,,

## 2022-11-23 PROCEDURE — 99214 PR OFFICE/OUTPT VISIT, EST, LEVL IV, 30-39 MIN: ICD-10-PCS | Mod: S$GLB,,,

## 2022-11-23 PROCEDURE — 99499 UNLISTED E&M SERVICE: CPT | Mod: S$GLB,,,

## 2022-11-23 PROCEDURE — 1126F PR PAIN SEVERITY QUANTIFIED, NO PAIN PRESENT: ICD-10-PCS | Mod: CPTII,S$GLB,,

## 2022-11-23 PROCEDURE — 1159F MED LIST DOCD IN RCRD: CPT | Mod: CPTII,S$GLB,,

## 2022-11-23 PROCEDURE — 1101F PR PT FALLS ASSESS DOC 0-1 FALLS W/OUT INJ PAST YR: ICD-10-PCS | Mod: CPTII,S$GLB,,

## 2022-11-23 PROCEDURE — 99999 PR PBB SHADOW E&M-EST. PATIENT-LVL III: ICD-10-PCS | Mod: PBBFAC,,,

## 2022-11-23 PROCEDURE — 99999 PR PBB SHADOW E&M-EST. PATIENT-LVL III: CPT | Mod: PBBFAC,,,

## 2022-11-23 PROCEDURE — 3288F FALL RISK ASSESSMENT DOCD: CPT | Mod: CPTII,S$GLB,,

## 2022-11-23 PROCEDURE — 99214 OFFICE O/P EST MOD 30 MIN: CPT | Mod: S$GLB,,,

## 2022-11-23 PROCEDURE — 1101F PT FALLS ASSESS-DOCD LE1/YR: CPT | Mod: CPTII,S$GLB,,

## 2022-11-23 PROCEDURE — 99499 RISK ADDL DX/OHS AUDIT: ICD-10-PCS | Mod: S$GLB,,,

## 2022-11-23 PROCEDURE — 1126F AMNT PAIN NOTED NONE PRSNT: CPT | Mod: CPTII,S$GLB,,

## 2022-11-23 NOTE — PROGRESS NOTES
Subjective:       Patient ID: Claude T Dupuis is a 89 y.o. male     Chief Complaint: Wound Check    Patient presents with his son for a reevaluation of a left lower extremity wound. Patient follows with Dr. Lewis for PAD. Patient followed by Dr. Kothari- left greater saphenous ablation scheduled for 12/22/22. No complaints at this time. Denies fever, chills, erythema, warmth, or purulent drainage.      9/7/22 CV Toe Pressures:   · Right TBI 0.35, suggestive of severe right lower extremity arterial disease.  · Left TBI 0.14, is suggestive of severe left lower extremity arterial disease.  · Digit waveforms are mildly dampened on the left.    9/7/22 Ultrasound Doppler Arterial:  · There is scattered atherosclerotic plaque throughout the bilateral lower extremity arteries.  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  · Right anterior tibial artery stenosis, greater than 75%.  · Right posterior tibial artery is occluded.  · Right peroneal artery not visualized, possibly occluded.  · Left tibio peroneal trunk artery stenosis, 50-75%.  · Left anterior tibial artery stenosis, greater than 75%.  · Left posterior tibial artery is occluded.    Review of Systems   Constitutional:  Negative for activity change, chills, diaphoresis, fatigue and fever.   Respiratory:  Negative for apnea, chest tightness and shortness of breath.    Cardiovascular:  Positive for leg swelling. Negative for chest pain and palpitations.   Musculoskeletal:  Negative for gait problem and joint swelling.   Skin:  Positive for wound. Negative for pallor and rash.   Neurological:  Positive for light-headedness. Negative for syncope, weakness and numbness.   Psychiatric/Behavioral:  Negative for agitation. The patient is not nervous/anxious.    All other systems reviewed and are negative.    Objective:      Physical Exam  Vitals reviewed.   Constitutional:       General: He is not in acute distress.     Appearance: Normal  appearance.   HENT:      Right Ear: Decreased hearing noted.      Left Ear: Decreased hearing noted.   Cardiovascular:      Rate and Rhythm: Normal rate and regular rhythm.   Pulmonary:      Effort: No respiratory distress.   Musculoskeletal:         General: No swelling.      Right lower leg: Edema present.      Left lower leg: Edema present.   Skin:     General: Skin is warm and dry.      Findings: Wound present. No erythema.          Neurological:      General: No focal deficit present.      Mental Status: He is alert and oriented to person, place, and time.   Psychiatric:         Mood and Affect: Mood normal.         Behavior: Behavior normal.         Thought Content: Thought content normal.         Judgment: Judgment normal.       Assessment:       1. Edema of both lower extremities    2. PAD (peripheral artery disease)             Altered Skin Integrity Left lower;medial Leg (Active)       Altered Skin Integrity Present on Admission:    Side: Left   Orientation: lower;medial   Location: Leg   Wound Number:    Is this injury device related?:    Primary Wound Type:    Description of Altered Skin Integrity:    Ankle-Brachial Index:    Pulses:    Removal Indication and Assessment:    Wound Outcome:    (Retired) Wound Length (cm):    (Retired) Wound Width (cm):    (Retired) Depth (cm):    Wound Description (Comments):    Removal Indications:    Wound Image   11/23/22 0920   Dressing Appearance Dry;Intact;Clean 11/23/22 0920   Drainage Amount None 11/23/22 0920   Care Cleansed with:;Wound cleanser 11/23/22 0920       Claude was seen in the clinic room and placed in the supine position on the treatment table.  The dressing was removed. The leg was cleansed with Easi-clense sponges and dried thoroughly. No open wounds noted      Plan:     Instructed patient to keep follow up appointments with cardiology and neurology for pre-syncopal symptoms.     No open wounds noted.  Precautions given to prevent wounds from  re-opening.    Discussed PAD with patient. Discussed toe pressure, venous ultrasound, and arterial ultrasound results with patient and patient's son. Unable to utilize therapeutic compression due to severe bilateral lower extremity arterial disease. Instructed patient to keep appointments with Dr. Kothari and Dr. Lewis.     Instructed patient to elevate legs whenever sedentary, follow a low sodium diet, and to continue taking bumetanide as prescribed.     Written and verbal instructions given to patient.  RTC PRYOLY Olmstead PA-C

## 2022-12-04 ENCOUNTER — PATIENT MESSAGE (OUTPATIENT)
Dept: CARDIOLOGY | Facility: HOSPITAL | Age: 87
End: 2022-12-04
Payer: MEDICARE

## 2022-12-16 ENCOUNTER — OFFICE VISIT (OUTPATIENT)
Dept: INTERNAL MEDICINE | Facility: CLINIC | Age: 87
End: 2022-12-16
Payer: MEDICARE

## 2022-12-16 VITALS
TEMPERATURE: 97 F | WEIGHT: 175.06 LBS | HEART RATE: 68 BPM | HEIGHT: 67 IN | SYSTOLIC BLOOD PRESSURE: 108 MMHG | BODY MASS INDEX: 27.48 KG/M2 | DIASTOLIC BLOOD PRESSURE: 64 MMHG | OXYGEN SATURATION: 100 %

## 2022-12-16 DIAGNOSIS — E11.9 TYPE 2 DIABETES MELLITUS WITHOUT COMPLICATION, WITHOUT LONG-TERM CURRENT USE OF INSULIN: ICD-10-CM

## 2022-12-16 DIAGNOSIS — E11.59 HYPERTENSION ASSOCIATED WITH DIABETES: Chronic | ICD-10-CM

## 2022-12-16 DIAGNOSIS — Z85.038 HISTORY OF COLON CANCER: Chronic | ICD-10-CM

## 2022-12-16 DIAGNOSIS — E03.8 OTHER SPECIFIED HYPOTHYROIDISM: Chronic | ICD-10-CM

## 2022-12-16 DIAGNOSIS — I82.5Y9 CHRONIC DEEP VEIN THROMBOSIS (DVT) OF PROXIMAL VEIN OF LOWER EXTREMITY, UNSPECIFIED LATERALITY: Chronic | ICD-10-CM

## 2022-12-16 DIAGNOSIS — I15.2 HYPERTENSION ASSOCIATED WITH DIABETES: Chronic | ICD-10-CM

## 2022-12-16 DIAGNOSIS — Z00.00 ANNUAL PHYSICAL EXAM: Primary | ICD-10-CM

## 2022-12-16 DIAGNOSIS — I73.9 PAD (PERIPHERAL ARTERY DISEASE): Chronic | ICD-10-CM

## 2022-12-16 PROBLEM — I10 HYPERTENSION: Chronic | Status: ACTIVE | Noted: 2022-02-21

## 2022-12-16 PROCEDURE — 1160F PR REVIEW ALL MEDS BY PRESCRIBER/CLIN PHARMACIST DOCUMENTED: ICD-10-PCS | Mod: CPTII,S$GLB,, | Performed by: INTERNAL MEDICINE

## 2022-12-16 PROCEDURE — 1159F PR MEDICATION LIST DOCUMENTED IN MEDICAL RECORD: ICD-10-PCS | Mod: CPTII,S$GLB,, | Performed by: INTERNAL MEDICINE

## 2022-12-16 PROCEDURE — 3288F FALL RISK ASSESSMENT DOCD: CPT | Mod: CPTII,S$GLB,, | Performed by: INTERNAL MEDICINE

## 2022-12-16 PROCEDURE — 99999 PR PBB SHADOW E&M-EST. PATIENT-LVL IV: ICD-10-PCS | Mod: PBBFAC,,, | Performed by: INTERNAL MEDICINE

## 2022-12-16 PROCEDURE — 1101F PT FALLS ASSESS-DOCD LE1/YR: CPT | Mod: CPTII,S$GLB,, | Performed by: INTERNAL MEDICINE

## 2022-12-16 PROCEDURE — 90677 PNEUMOCOCCAL CONJUGATE VACCINE 20-VALENT: ICD-10-PCS | Mod: S$GLB,,, | Performed by: INTERNAL MEDICINE

## 2022-12-16 PROCEDURE — 1101F PR PT FALLS ASSESS DOC 0-1 FALLS W/OUT INJ PAST YR: ICD-10-PCS | Mod: CPTII,S$GLB,, | Performed by: INTERNAL MEDICINE

## 2022-12-16 PROCEDURE — 1159F MED LIST DOCD IN RCRD: CPT | Mod: CPTII,S$GLB,, | Performed by: INTERNAL MEDICINE

## 2022-12-16 PROCEDURE — 3288F PR FALLS RISK ASSESSMENT DOCUMENTED: ICD-10-PCS | Mod: CPTII,S$GLB,, | Performed by: INTERNAL MEDICINE

## 2022-12-16 PROCEDURE — G0009 ADMIN PNEUMOCOCCAL VACCINE: HCPCS | Mod: S$GLB,,, | Performed by: INTERNAL MEDICINE

## 2022-12-16 PROCEDURE — 99499 UNLISTED E&M SERVICE: CPT | Mod: S$GLB,,, | Performed by: INTERNAL MEDICINE

## 2022-12-16 PROCEDURE — 99999 PR PBB SHADOW E&M-EST. PATIENT-LVL IV: CPT | Mod: PBBFAC,,, | Performed by: INTERNAL MEDICINE

## 2022-12-16 PROCEDURE — 1160F RVW MEDS BY RX/DR IN RCRD: CPT | Mod: CPTII,S$GLB,, | Performed by: INTERNAL MEDICINE

## 2022-12-16 PROCEDURE — 90677 PCV20 VACCINE IM: CPT | Mod: S$GLB,,, | Performed by: INTERNAL MEDICINE

## 2022-12-16 PROCEDURE — 99387 INIT PM E/M NEW PAT 65+ YRS: CPT | Mod: GZ,S$GLB,, | Performed by: INTERNAL MEDICINE

## 2022-12-16 PROCEDURE — G0009 PNEUMOCOCCAL CONJUGATE VACCINE 20-VALENT: ICD-10-PCS | Mod: S$GLB,,, | Performed by: INTERNAL MEDICINE

## 2022-12-16 PROCEDURE — 99387 PR PREVENTIVE VISIT,NEW,65 & OVER: ICD-10-PCS | Mod: GZ,S$GLB,, | Performed by: INTERNAL MEDICINE

## 2022-12-16 PROCEDURE — 99499 RISK ADDL DX/OHS AUDIT: ICD-10-PCS | Mod: S$GLB,,, | Performed by: INTERNAL MEDICINE

## 2022-12-16 RX ORDER — LEVOTHYROXINE SODIUM 88 UG/1
88 TABLET ORAL
Qty: 90 TABLET | Refills: 3 | Status: SHIPPED | OUTPATIENT
Start: 2022-12-16 | End: 2024-02-21

## 2022-12-16 NOTE — PROGRESS NOTES
Subjective:       Patient ID: Claude T Dupuis is a 89 y.o. male.    Chief Complaint: Annual Exam    HPI    89 y.o. male here for annual exam accompanied by daughter.    Cholesterol: WNL  Vaccines: Influenza - cannot have; Tetanus - 2016; Prevnar 20 - needs; Zoster - ; COVID -   Eye exam: done several years ago  Prostate: no longer indicated (patient agrees)  Colonoscopy: no longer indicated (patient agrees)  A1c: 7.5    Exercise: doing some arm weights now.  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    Hypertension     PAD (peripheral artery disease) 09/28/2022    Type 2 diabetes mellitus without complications      Past Surgical History:   Procedure Laterality Date    COLON SURGERY  2005    CORONARY ARTERY BYPASS GRAFT  2007    EYE SURGERY      VEIN BYPASS SURGERY       Social History     Socioeconomic History    Marital status:    Tobacco Use    Smoking status: Never    Smokeless tobacco: Never   Substance and Sexual Activity    Alcohol use: No    Drug use: No    Sexual activity: Not Currently     Partners: Female     Review of patient's allergies indicates:   Allergen Reactions    Fluzone high-dose 0513-1930 [influenza vaccine tr-s 09 (pf)]      Had guillane barre Claude T. Dupuis had no medications administered during this visit.    Review of Systems      Objective:      Physical Exam  Vitals reviewed.   Constitutional:       Appearance: He is well-developed.   HENT:      Head: Normocephalic and atraumatic.      Mouth/Throat:      Pharynx: No oropharyngeal exudate.   Eyes:      General: No scleral icterus.        Right eye: No discharge.         Left eye: No discharge.      Pupils: Pupils are equal, round, and reactive to light.   Neck:      Thyroid: No thyromegaly.      Trachea: No tracheal deviation.   Cardiovascular:      Rate and Rhythm: Normal rate and regular rhythm.       Heart sounds: Normal heart sounds. No murmur heard.    No friction rub. No gallop.   Pulmonary:      Effort: Pulmonary effort is normal. No respiratory distress.      Breath sounds: Normal breath sounds. No wheezing or rales.   Chest:      Chest wall: No tenderness.   Abdominal:      General: Bowel sounds are normal. There is no distension.      Palpations: Abdomen is soft. There is no mass.      Tenderness: There is no abdominal tenderness. There is no guarding or rebound.   Musculoskeletal:         General: Swelling (1+ bilateral LE) present. No tenderness. Normal range of motion.      Cervical back: Normal range of motion and neck supple.   Skin:     General: Skin is warm and dry.      Coloration: Skin is not pale.      Findings: No erythema or rash.   Neurological:      Mental Status: He is alert and oriented to person, place, and time.   Psychiatric:         Behavior: Behavior normal.       Assessment:       1. Annual physical exam    2. Type 2 diabetes mellitus without complication, without long-term current use of insulin    3. Hypertension associated with diabetes    4. PAD (peripheral artery disease)    5. Other specified hypothyroidism  - TSH; Future    6. Chronic deep vein thrombosis (DVT) of proximal vein of lower extremity, unspecified laterality    7. History of colon cancer      Plan:       1. Reviewed lab work with patient.  Up-to-date on vaccines.  Discussed diet and exercise.  2. Monitor.  Not requiring medication.    3. Lisinopril 20 mg, Lopressor 100 mg b.i.d..  4/6.  Continue aspirin 325 mg.  5.  Decrease Synthroid to 88 mcg daily.  Check TSH in 3 months.    7. Monitor

## 2023-02-06 PROBLEM — G45.9 TIA (TRANSIENT ISCHEMIC ATTACK): Status: RESOLVED | Noted: 2022-11-02 | Resolved: 2023-02-06

## 2023-04-05 ENCOUNTER — OFFICE VISIT (OUTPATIENT)
Dept: CARDIOLOGY | Facility: CLINIC | Age: 88
End: 2023-04-05
Payer: MEDICARE

## 2023-04-05 VITALS
DIASTOLIC BLOOD PRESSURE: 88 MMHG | BODY MASS INDEX: 27.57 KG/M2 | SYSTOLIC BLOOD PRESSURE: 158 MMHG | WEIGHT: 175.69 LBS | HEART RATE: 60 BPM | HEIGHT: 67 IN

## 2023-04-05 DIAGNOSIS — R60.0 EDEMA OF BOTH LOWER EXTREMITIES: ICD-10-CM

## 2023-04-05 DIAGNOSIS — R41.0 CONFUSION: ICD-10-CM

## 2023-04-05 DIAGNOSIS — I50.31 ACUTE DIASTOLIC HEART FAILURE: ICD-10-CM

## 2023-04-05 DIAGNOSIS — I25.10 CORONARY ARTERY DISEASE INVOLVING NATIVE CORONARY ARTERY OF NATIVE HEART WITHOUT ANGINA PECTORIS: ICD-10-CM

## 2023-04-05 DIAGNOSIS — G45.9 TIA (TRANSIENT ISCHEMIC ATTACK): ICD-10-CM

## 2023-04-05 DIAGNOSIS — L97.922 ULCER OF LEFT LOWER EXTREMITY WITH FAT LAYER EXPOSED: Primary | ICD-10-CM

## 2023-04-05 DIAGNOSIS — R56.9 SEIZURE: ICD-10-CM

## 2023-04-05 DIAGNOSIS — I87.2 VENOUS INSUFFICIENCY OF LEFT LOWER EXTREMITY: ICD-10-CM

## 2023-04-05 DIAGNOSIS — E11.59 HYPERTENSION ASSOCIATED WITH DIABETES: Chronic | ICD-10-CM

## 2023-04-05 DIAGNOSIS — I73.9 PAD (PERIPHERAL ARTERY DISEASE): ICD-10-CM

## 2023-04-05 DIAGNOSIS — I15.2 HYPERTENSION ASSOCIATED WITH DIABETES: Chronic | ICD-10-CM

## 2023-04-05 DIAGNOSIS — I20.9 ANGINA PECTORIS: ICD-10-CM

## 2023-04-05 DIAGNOSIS — M79.605 LEFT LEG PAIN: ICD-10-CM

## 2023-04-05 DIAGNOSIS — Z95.1 HX OF CABG: ICD-10-CM

## 2023-04-05 DIAGNOSIS — E11.9 TYPE 2 DIABETES MELLITUS WITHOUT COMPLICATION, WITHOUT LONG-TERM CURRENT USE OF INSULIN: Chronic | ICD-10-CM

## 2023-04-05 PROCEDURE — 1126F PR PAIN SEVERITY QUANTIFIED, NO PAIN PRESENT: ICD-10-PCS | Mod: CPTII,S$GLB,, | Performed by: INTERNAL MEDICINE

## 2023-04-05 PROCEDURE — 1159F MED LIST DOCD IN RCRD: CPT | Mod: CPTII,S$GLB,, | Performed by: INTERNAL MEDICINE

## 2023-04-05 PROCEDURE — 99215 OFFICE O/P EST HI 40 MIN: CPT | Mod: S$GLB,,, | Performed by: INTERNAL MEDICINE

## 2023-04-05 PROCEDURE — 1126F AMNT PAIN NOTED NONE PRSNT: CPT | Mod: CPTII,S$GLB,, | Performed by: INTERNAL MEDICINE

## 2023-04-05 PROCEDURE — 99999 PR PBB SHADOW E&M-EST. PATIENT-LVL III: CPT | Mod: PBBFAC,,, | Performed by: INTERNAL MEDICINE

## 2023-04-05 PROCEDURE — 3288F FALL RISK ASSESSMENT DOCD: CPT | Mod: CPTII,S$GLB,, | Performed by: INTERNAL MEDICINE

## 2023-04-05 PROCEDURE — 1101F PR PT FALLS ASSESS DOC 0-1 FALLS W/OUT INJ PAST YR: ICD-10-PCS | Mod: CPTII,S$GLB,, | Performed by: INTERNAL MEDICINE

## 2023-04-05 PROCEDURE — 99999 PR PBB SHADOW E&M-EST. PATIENT-LVL III: ICD-10-PCS | Mod: PBBFAC,,, | Performed by: INTERNAL MEDICINE

## 2023-04-05 PROCEDURE — 99215 PR OFFICE/OUTPT VISIT, EST, LEVL V, 40-54 MIN: ICD-10-PCS | Mod: S$GLB,,, | Performed by: INTERNAL MEDICINE

## 2023-04-05 PROCEDURE — 3288F PR FALLS RISK ASSESSMENT DOCUMENTED: ICD-10-PCS | Mod: CPTII,S$GLB,, | Performed by: INTERNAL MEDICINE

## 2023-04-05 PROCEDURE — 1159F PR MEDICATION LIST DOCUMENTED IN MEDICAL RECORD: ICD-10-PCS | Mod: CPTII,S$GLB,, | Performed by: INTERNAL MEDICINE

## 2023-04-05 PROCEDURE — 1101F PT FALLS ASSESS-DOCD LE1/YR: CPT | Mod: CPTII,S$GLB,, | Performed by: INTERNAL MEDICINE

## 2023-04-05 RX ORDER — ATORVASTATIN CALCIUM 40 MG/1
40 TABLET, FILM COATED ORAL DAILY
Qty: 90 TABLET | Refills: 3 | Status: SHIPPED | OUTPATIENT
Start: 2023-04-05 | End: 2024-04-04

## 2023-04-05 NOTE — PROGRESS NOTES
"[facility]      Chief Complaint   Patient presents with    Peripheral Arterial Disease    Coronary Artery Disease       Patient ID: Claude T Dupuis is a 89 y.o. male with PAD, CAD s/p CABG (2007), HTN, colon cancer s/p cancer, Guillain [place] Syndrome, who presents for a follow up appointment.  Pertinent history/events are as follows:     -Pt presents for evaluation of left leg wound/ulcer.      -At our initial clinic visit on 9/7/2022: Mr. Cox is accompanied by his son.  He reports 6 weeks ago he "burned his left leg with hot water while getting into the shower.  States a large blister formed at the left lower leg.  He notes pain and burning sensation at the left lower leg.  States he went to the ED at [facility] 10 days after the injury and they "popped the blister and gave me antibiotics".   Plan:   LLE Wound/Ulcer- Likely due to burn with possible underlying venous insufficiency.  Check BLE venous reflux study, toe pressure study and arterial ultrasound.  Refer to wound care.  Treat with doxycycline 100 mg bid for 14 days.  Start bumex 0.5 mg bid.  Check cmp today and in 1 week.  Elevate legs when resting.  Pt to use Tylenol for pain.   CAD s/p CABD- No angina currently.  Continue current medications.  HTN- Controlled.  Continue current medications.     -At follow up clinic visit on 9/28/2022, Mr. Cox reports feeling much better.  He has no chest pain or SOB.  Review of wound care images shows the LLE wound is healing slowly.  Toe Pressure Study on 9/16/2022 demonstrated Left Toe Pressure 23 mmHg; Right Toe Pressure 57 mmHg.  BLE Arterial Ultrasound on 9/16/2022 revealed scattered atherosclerotic plaque throughout the bilateral lower extremity arteries.  Right tibio peroneal trunk artery is occluded in the distal segment.  Right anterior tibial artery stenosis, greater than 75%.  Right posterior tibial artery is occluded.  Left tibio peroneal trunk artery stenosis, 50-75%.  Left anterior " "tibial artery stenosis, greater than 75%. Left posterior tibial artery is occluded.  BLE Venous Reflux Study on 9/19/2022 revealed bilateral GSV reflux with no DVT.  The right smaller saphenous vein is noncompressible, consistent with superficial venous thrombosis (SVT)  Plan:   PAD with LLE Wound/Ulcer- Likely due to burn with possible underlying venous insufficiency.  Toe Pressure Study on 9/16/2022 demonstrated Left Toe Pressure 23 mmHg; Right Toe Pressure 57 mmHg.  BLE Arterial Ultrasound on 9/16/2022 revealed scattered atherosclerotic plaque throughout the bilateral lower extremity arteries.  Right tibio peroneal trunk artery is occluded in the distal segment.  Right anterior tibial artery stenosis, greater than 75%.  Right posterior tibial artery is occluded.  Left tibio peroneal trunk artery stenosis, 50-75%.  Left anterior tibial artery stenosis, greater than 75%. Left posterior tibial artery is occluded.  Continue wound care.  Treat with doxycycline 100 mg bid for 14 days.  Continue bumex 0.5 mg bid.  Elevate legs when resting.  Continue to use Tylenol for pain.  Given significant LLE PAD, will refer for evaluation for possible LLE angio/intervention with Dr. Kothari (in the event the wound does not continue to heal appropriately).  Continue to monitor.    RLE Superficial Venous Thrombosis- BLE Venous Reflux Study on 9/19/2022 revealed bilateral GSV reflux with no DVT.  The right smaller saphenous vein is noncompressible, consistent with superficial venous thrombosis (SVT).  Treat with Xarelto 10 mg daily for 45 days.    CAD s/p CABD- No angina currently.  Continue current medications.  HTN- Controlled.  Continue current medications.      -11/2/2022 clinic visit: Mr. Cox reports episodes where he experiences sudden onset of discomfort in his left arm, follow by a period where he "feels like I'm going to pass out".  An episode was witnessed by his son yesterday.  He did not lose consciousness during the " episode.  Episodes occur once every 2 months.  He reports no chest pain or chest discomfort.  LLE wound/ulcer is healing.  Pt evaluated by Dr. Kothari on 10/31/2022, who plans to perform left greater saphenous ablation in an attempt to expedite healing of the wound and keep him in remission without the cycle of recurrent ulceration.  Plan:   PAD with LLE Wound/Ulcer- Likely due to burn with possible underlying venous insufficiency.  Toe Pressure Study on 9/16/2022 demonstrated Left Toe Pressure 23 mmHg; Right Toe Pressure 57 mmHg.  BLE Arterial Ultrasound on 9/16/2022 revealed scattered atherosclerotic plaque throughout the bilateral lower extremity arteries.  Right tibio peroneal trunk artery is occluded in the distal segment.  Right anterior tibial artery stenosis, greater than 75%.  Right posterior tibial artery is occluded.  Left tibio peroneal trunk artery stenosis, 50-75%.  Left anterior tibial artery stenosis, greater than 75%. Left posterior tibial artery is occluded.  Continue wound care.  Treat with doxycycline 100 mg bid for 14 days.  Continue bumex 0.5 mg bid.  Elevate legs when resting.  Pt evaluated by Dr. Kothari on 10/31/2022, who plans to perform left greater saphenous ablation in an attempt to expedite healing of the wound and keep him in remission without the cycle of recurrent ulceration.  Continue to monitor.   Episodes of Right Arm Pain/Confusion- Etiology unclear.  Concerning for seizure.  Must also consider myocardial ischemia, arrhythmia, and TIA.  Refer to Neurology for evaluation of possible seizure.  Check PET stress test, echo, carotid ultrasound, and 30 day event monitor.  Continue current medications.    RLE Superficial Venous Thrombosis- BLE Venous Reflux Study on 9/19/2022 revealed bilateral GSV reflux with no DVT.  The right smaller saphenous vein is noncompressible, consistent with superficial venous thrombosis (SVT).  Treat with Xarelto 10 mg daily for 45 days.    CAD s/p CABD- No  "angina currently.  Pt with episodes of Right Arm Pain/Confusion- Etiology unclear.  Concerning for seizure.   Must also consider myocardial ischemia, arrhythmia, and TIA.  Check PET stress test, echo, carotid ultrasound and 30 day event monitor.  Continue ASA, beta blocker ACEI, imdur.  Start atorvastatin 40 mg daily.    HTN- Controlled.  Continue current medications.        HPI:  Mr. Cox reports no further episodes where he experiences sudden onset of discomfort in his left arm, follow by a period where he "feels like I'm going to pass out".  Leg wounds have completely healed.  Cardiac Event Monitor on 11/11/2022 revealed sinus rhythm/sinus bradycardia with rare PVCs.  Echo on 11/11/2022: demonstrated EF 65%; there are segmental left ventricular wall motion abnormalities; normal right ventricular size with normal right ventricular systolic function; indeterminate left ventricular diastolic function; mild left atrial enlargement.  Carotid Ultrasound on 11/3/2022 demonstrated no evidence of significant ICA stenosis (less than 50%) bilaterally.  Vertebral flow is antegrade bilaterally.    Past Medical History:   Diagnosis Date    Cancer     Colon cancer     Coronary artery disease     Guillain-[place] syndrome     Hx of CABG 09/07/2022    Hypertension     PAD (peripheral artery disease) 09/28/2022    Type 2 diabetes mellitus without complications      Past Surgical History:   Procedure Laterality Date    COLON SURGERY  2005    CORONARY ARTERY BYPASS GRAFT  2007    EYE SURGERY      VEIN BYPASS SURGERY       Social History     Socioeconomic History    Marital status:    Tobacco Use    Smoking status: Never    Smokeless tobacco: Never   Substance and Sexual Activity    Alcohol use: No    Drug use: No    Sexual activity: Not Currently     Partners: Female     Family History   Problem Relation Age of Onset    Diabetes Mother        Review of patient's allergies indicates:   Allergen Reactions    Fluzone high-dose " "[id number] [influenza vaccine tr-s 09 (pf)]      Had guillane barre       Medication List with Changes/Refills   Current Medications    ASPIRIN 325 MG TABLET    Take 325 mg by mouth once daily.    BUMETANIDE (BUMEX) 0.5 MG TAB    Take 1 tablet (0.5 mg total) by mouth 2 (two) times a day.    ISOSORBIDE MONONITRATE (IMDUR) 30 MG 24 HR TABLET    TAKE 1 TABLET(30 MG) BY MOUTH EVERY DAY    LEVOTHYROXINE (SYNTHROID) 88 MCG TABLET    Take 1 tablet (88 mcg total) by mouth before breakfast.    LISINOPRIL (PRINIVIL,ZESTRIL) 20 MG TABLET    Take 20 mg by mouth once daily.    METOPROLOL TARTRATE (LOPRESSOR) 100 MG TABLET    Take 100 mg by mouth 2 (two) times daily.       Review of Systems  Constitution: Denies chills, fever, and sweats.  HENT: Denies headaches or blurry vision.  Cardiovascular: Denies chest pain or irregular heart beat.  Respiratory: Denies cough or shortness of breath.  Gastrointestinal: Denies abdominal pain, nausea, or vomiting.  Musculoskeletal: Denies muscle cramps.  Neurological: Positive for left leg wound with pain and swelling.  Psychiatric/Behavioral: Normal mental status.  Hematologic/Lymphatic: Denies bleeding problem or easy bruising/bleeding.  Skin: Denies rash or suspicious lesions    Physical Examination  BP (!) 158/88   Pulse 60   Ht 5' 7" (1.702 m)   Wt 79.7 kg (175 lb 11.3 oz)   BMI 27.52 kg/m²     Constitutional: No acute distress, conversant  HEENT: Sclera anicteric, Pupils equal, round and reactive to light, extraocular motions intact, Oropharynx clear  Neck: No JVD, no carotid bruits  Cardiovascular: regular rate and rhythm, no murmur, rubs or gallops, normal S1/S2  Pulmonary: Clear to auscultation bilaterally  Abdominal: Abdomen soft, nontender, nondistended, positive bowel sounds  Extremities: BLE's with 1 pitting edema and venous stasis dermatitis  LLE with 4 x 5.5 cm ulcer/wound at medial aspect near ankle   Pulses:  Carotid pulses are 2+ on the right side, and 2+ on the left " side.  Radial pulses are 2+ on the right side, and 2+ on the left side.   Femoral pulses are 2+ on the right side, and 2+ on the left side.  Popliteal pulses are 2+ on the right side, and 2+ on the left side.   Dorsalis pedis pulses are 2+ on the right side, and 2+ on the left side.   Posterior tibial pulses are 2+ on the right side, and 2+ on the left side.    Skin: No ecchymosis, erythema, or ulcers  Psych: Alert and oriented x 3, appropriate affect  Neuro: CNII-XII intact, no focal deficits    Labs:  Most Recent Data  CBC: No results found for: WBC, HGB, HCT, PLT, MCV, RDW  BMP:   Lab Results   Component Value Date     10/26/2022    K 3.7 10/26/2022     10/26/2022    CO2 28 10/26/2022    BUN 20 10/26/2022    CREATININE 1.1 10/26/2022     (H) 10/26/2022    CALCIUM 9.5 10/26/2022     LFTS;   Lab Results   Component Value Date    PROT 7.2 10/26/2022    ALBUMIN 3.7 10/26/2022    BILITOT 1.1 (H) 10/26/2022    AST 20 10/26/2022    ALKPHOS 76 10/26/2022    ALT 17 10/26/2022     COAGS: No results found for: INR, PROTIME, PTT  FLP:   Lab Results   Component Value Date    CHOL 147 10/26/2022    HDL 46 10/26/2022    LDLCALC 87.2 10/26/2022    TRIG 69 10/26/2022    CHOLHDL 31.3 10/26/2022     CARDIAC: No results found for: TROPONINI, CKMB, BNP    Cardiac Event Monitor 11/11/2022:  At baseline, in the absence of symptoms, the rhythm was sinus with PVCs at 78 beats per minute.  There was 1 activation for symptoms of dizziness and lightheadedness on 11/15 at 10:12 a.m..  This showed sinus bradycardia at 50 beats per minute.  Other activations during which time no symptom was reported all showed sinus rhythm with rates ranging between 56 and 78 beats per minute.     Impression:  Sinus rhythm/sinus bradycardia.  Rare PVCs.    Echo 11/11/2022:  The left ventricle is normal in size with normal systolic function. The estimated ejection fraction is 65%.  There are segmental left ventricular wall motion  abnormalities.  Normal right ventricular size with normal right ventricular systolic function.  Indeterminate left ventricular diastolic function.  Mild left atrial enlargement.    Carotid Ultrasound 11/3/2022:  Bilateral carotid atherosclerosis is present.  No evidence of significant ICA stenosis (less than 50%) bilaterally.  Vertebral flow is antegrade bilaterally.    Toe Pressure Study 9/16/2022:  Right TBI 0.35, suggestive of severe right lower extremity arterial disease.  Left TBI 0.14, is suggestive of severe left lower extremity arterial disease.  Digit waveforms are mildly dampened on the left.  Left Toe Pressure 23 mmHg; Right Toe Pressure 57 mmHg    BLE Arterial Ultrasound 9/16/2022:  There is scattered atherosclerotic plaque throughout the bilateral lower extremity arteries.  Right tibio peroneal trunk artery is occluded in the distal segment.  Right anterior tibial artery stenosis, greater than 75%.  Right posterior tibial artery is occluded.  Right peroneal artery not visualized, possibly occluded.  Left tibio peroneal trunk artery stenosis, 50-75%.  Left anterior tibial artery stenosis, greater than 75%.  Left posterior tibial artery is occluded.    BLE Venous Reflux Study 9/19/2022  There is no evidence of a right lower extremity DVT.  The right smaller saphenous vein is noncompressible, consistent with superficial venous thrombosis (SVT).  The right greater saphenous vein has reflux.  There is no evidence of a left lower extremity DVT.  The left greater saphenous vein has reflux.    Assessment/Plan:  Claude T Dupuis is a 89 y.o. male with PAD, CAD s/p CABG (2007), HTN, colon cancer s/p cancer, Guillain [place] Syndrome, who presents for a follow up appointment.    1. PAD with LLE Wound/Ulcer- Likely due to burn with possible underlying venous insufficiency.  Leg wounds have completely healed.  Start atorvastatin 40 mg daily.  Continue ASA, 81 mg daily and bumex 0.5 mg bid.  Elevate legs when resting.   Continue to monitor. Start graduated compression hose.      2. Episodes of Right Arm Pain/Confusion- Etiology unclear.  Now resolved.  Concerning for seizure.  Must also consider myocardial ischemia, arrhythmia, and TIA.  Cardiac Event Monitor on 11/11/2022 revealed sinus rhythm/sinus bradycardia with rare PVCs.  Echo on 11/11/2022: demonstrated EF 65%; there are segmental left ventricular wall motion abnormalities; normal right ventricular size with normal right ventricular systolic function; indeterminate left ventricular diastolic function; mild left atrial enlargement.  Carotid Ultrasound on 11/3/2022 demonstrated no evidence of significant ICA stenosis (less than 50%) bilaterally.  Vertebral flow is antegrade bilaterally.  Pt states he does not want to have stress test done.  Pt referred to Neurology for evaluation of possible seizure.  Continue current medications.      2. RLE Superficial Venous Thrombosis- BLE Venous Reflux Study on 9/19/2022 revealed bilateral GSV reflux with no DVT.  The right smaller saphenous vein is noncompressible, consistent with superficial venous thrombosis (SVT).  Pt treated with Xarelto 10 mg daily for 45 days.      3. CAD s/p CABD- No angina currently.  Pt with episodes of Right Arm Pain/Confusion- Etiology unclear.  Concerning for seizure.   Must also consider myocardial ischemia, arrhythmia, and TIA.  Check PET stress test, echo, carotid ultrasound and 30 day event monitor.  Continue ASA, beta blocker ACEI, imdur.  Start atorvastatin 40 mg daily.      4. HTN- Controlled.  Continue current medications.      Follow up in 6 months     Total duration of face to face visit time 30 minutes.  Total time spent counseling greater than fifty percent of total visit time.  Counseling included discussion regarding imaging findings, diagnosis, possibilities, treatment options, risks and benefits.  The patient had many questions regarding the options and long-term effects.    Jose Antonio RUEDA  MD Joshua, PhD  Interventional Cardiology

## 2023-04-05 NOTE — PATIENT INSTRUCTIONS
Assessment/Plan:  Claude T Dupuis is a 89 y.o. male with PAD, CAD s/p CABG (2007), HTN, colon cancer s/p cancer, Guillain [place] Syndrome, who presents for a follow up appointment.    1. PAD with LLE Wound/Ulcer- Likely due to burn with possible underlying venous insufficiency.  Leg wounds have completely healed.  Start atorvastatin 40 mg daily.  Continue ASA, 81 mg daily and bumex 0.5 mg bid.  Elevate legs when resting.  Continue to monitor. Start graduated compression hose.      2. Episodes of Right Arm Pain/Confusion- Etiology unclear.  Now resolved.  Concerning for seizure.  Must also consider myocardial ischemia, arrhythmia, and TIA.  Cardiac Event Monitor on 11/11/2022 revealed sinus rhythm/sinus bradycardia with rare PVCs.  Echo on 11/11/2022: demonstrated EF 65%; there are segmental left ventricular wall motion abnormalities; normal right ventricular size with normal right ventricular systolic function; indeterminate left ventricular diastolic function; mild left atrial enlargement.  Carotid Ultrasound on 11/3/2022 demonstrated no evidence of significant ICA stenosis (less than 50%) bilaterally.  Vertebral flow is antegrade bilaterally.  Pt states he does not want to have stress test done.  Pt referred to Neurology for evaluation of possible seizure.  Continue current medications.      2. RLE Superficial Venous Thrombosis- BLE Venous Reflux Study on 9/19/2022 revealed bilateral GSV reflux with no DVT.  The right smaller saphenous vein is noncompressible, consistent with superficial venous thrombosis (SVT).  Pt treated with Xarelto 10 mg daily for 45 days.      3. CAD s/p CABD- No angina currently.  Pt with episodes of Right Arm Pain/Confusion- Etiology unclear.  Concerning for seizure.   Must also consider myocardial ischemia, arrhythmia, and TIA.  Check PET stress test, echo, carotid ultrasound and 30 day event monitor.  Continue ASA, beta blocker ACEI, imdur.  Start atorvastatin 40 mg daily.      4. HTN-  Controlled.  Continue current medications.      Follow up in 6 months

## 2023-04-19 ENCOUNTER — TELEPHONE (OUTPATIENT)
Dept: INTERNAL MEDICINE | Facility: CLINIC | Age: 88
End: 2023-04-19
Payer: MEDICARE

## 2023-04-19 NOTE — TELEPHONE ENCOUNTER
Vm left for patient to return call to schedule appt to complete [employer] paperwork in clinic and discuss correct answers

## 2023-05-10 ENCOUNTER — PES CALL (OUTPATIENT)
Dept: ADMINISTRATIVE | Facility: CLINIC | Age: 88
End: 2023-05-10
Payer: MEDICARE

## 2023-07-07 ENCOUNTER — LAB VISIT (OUTPATIENT)
Dept: LAB | Facility: HOSPITAL | Age: 88
End: 2023-07-07
Attending: INTERNAL MEDICINE
Payer: MEDICARE

## 2023-07-07 ENCOUNTER — OFFICE VISIT (OUTPATIENT)
Dept: INTERNAL MEDICINE | Facility: CLINIC | Age: 88
End: 2023-07-07
Payer: MEDICARE

## 2023-07-07 VITALS
BODY MASS INDEX: 28.27 KG/M2 | DIASTOLIC BLOOD PRESSURE: 80 MMHG | SYSTOLIC BLOOD PRESSURE: 130 MMHG | WEIGHT: 180.13 LBS | HEIGHT: 67 IN | OXYGEN SATURATION: 95 % | TEMPERATURE: 97 F | HEART RATE: 63 BPM

## 2023-07-07 DIAGNOSIS — I82.5Y9 CHRONIC DEEP VEIN THROMBOSIS (DVT) OF PROXIMAL VEIN OF LOWER EXTREMITY, UNSPECIFIED LATERALITY: Chronic | ICD-10-CM

## 2023-07-07 DIAGNOSIS — E11.9 TYPE 2 DIABETES MELLITUS WITHOUT COMPLICATION, WITHOUT LONG-TERM CURRENT USE OF INSULIN: Primary | Chronic | ICD-10-CM

## 2023-07-07 DIAGNOSIS — I10 PRIMARY HYPERTENSION: Chronic | ICD-10-CM

## 2023-07-07 DIAGNOSIS — I73.9 PAD (PERIPHERAL ARTERY DISEASE): Chronic | ICD-10-CM

## 2023-07-07 DIAGNOSIS — L89.151 PRESSURE INJURY OF SACRAL REGION, STAGE 1: ICD-10-CM

## 2023-07-07 DIAGNOSIS — E11.9 TYPE 2 DIABETES MELLITUS WITHOUT COMPLICATION, WITHOUT LONG-TERM CURRENT USE OF INSULIN: Chronic | ICD-10-CM

## 2023-07-07 LAB
ALBUMIN SERPL BCP-MCNC: 3.6 G/DL (ref 3.5–5.2)
ALP SERPL-CCNC: 96 U/L (ref 55–135)
ALT SERPL W/O P-5'-P-CCNC: 13 U/L (ref 10–44)
ANION GAP SERPL CALC-SCNC: 8 MMOL/L (ref 8–16)
AST SERPL-CCNC: 22 U/L (ref 10–40)
BILIRUB SERPL-MCNC: 1 MG/DL (ref 0.1–1)
BUN SERPL-MCNC: 20 MG/DL (ref 10–30)
CALCIUM SERPL-MCNC: 9.8 MG/DL (ref 8.7–10.5)
CHLORIDE SERPL-SCNC: 101 MMOL/L (ref 95–110)
CO2 SERPL-SCNC: 28 MMOL/L (ref 23–29)
CREAT SERPL-MCNC: 0.9 MG/DL (ref 0.5–1.4)
EST. GFR  (NO RACE VARIABLE): >60 ML/MIN/1.73 M^2
ESTIMATED AVG GLUCOSE: 148 MG/DL (ref 68–131)
GLUCOSE SERPL-MCNC: 110 MG/DL (ref 70–110)
HBA1C MFR BLD: 6.8 % (ref 4–5.6)
POTASSIUM SERPL-SCNC: 4.4 MMOL/L (ref 3.5–5.1)
PROT SERPL-MCNC: 7.3 G/DL (ref 6–8.4)
SODIUM SERPL-SCNC: 137 MMOL/L (ref 136–145)

## 2023-07-07 PROCEDURE — 1160F PR REVIEW ALL MEDS BY PRESCRIBER/CLIN PHARMACIST DOCUMENTED: ICD-10-PCS | Mod: CPTII,S$GLB,, | Performed by: INTERNAL MEDICINE

## 2023-07-07 PROCEDURE — 1126F PR PAIN SEVERITY QUANTIFIED, NO PAIN PRESENT: ICD-10-PCS | Mod: CPTII,S$GLB,, | Performed by: INTERNAL MEDICINE

## 2023-07-07 PROCEDURE — 99214 OFFICE O/P EST MOD 30 MIN: CPT | Mod: S$GLB,,, | Performed by: INTERNAL MEDICINE

## 2023-07-07 PROCEDURE — 80053 COMPREHEN METABOLIC PANEL: CPT | Performed by: INTERNAL MEDICINE

## 2023-07-07 PROCEDURE — 1100F PTFALLS ASSESS-DOCD GE2>/YR: CPT | Mod: CPTII,S$GLB,, | Performed by: INTERNAL MEDICINE

## 2023-07-07 PROCEDURE — 99999 PR PBB SHADOW E&M-EST. PATIENT-LVL IV: ICD-10-PCS | Mod: PBBFAC,,, | Performed by: INTERNAL MEDICINE

## 2023-07-07 PROCEDURE — 3288F FALL RISK ASSESSMENT DOCD: CPT | Mod: CPTII,S$GLB,, | Performed by: INTERNAL MEDICINE

## 2023-07-07 PROCEDURE — 1100F PR PT FALLS ASSESS DOC 2+ FALLS/FALL W/INJURY/YR: ICD-10-PCS | Mod: CPTII,S$GLB,, | Performed by: INTERNAL MEDICINE

## 2023-07-07 PROCEDURE — 1159F MED LIST DOCD IN RCRD: CPT | Mod: CPTII,S$GLB,, | Performed by: INTERNAL MEDICINE

## 2023-07-07 PROCEDURE — 1160F RVW MEDS BY RX/DR IN RCRD: CPT | Mod: CPTII,S$GLB,, | Performed by: INTERNAL MEDICINE

## 2023-07-07 PROCEDURE — 99999 PR PBB SHADOW E&M-EST. PATIENT-LVL IV: CPT | Mod: PBBFAC,,, | Performed by: INTERNAL MEDICINE

## 2023-07-07 PROCEDURE — 36415 COLL VENOUS BLD VENIPUNCTURE: CPT | Mod: PO | Performed by: INTERNAL MEDICINE

## 2023-07-07 PROCEDURE — 1126F AMNT PAIN NOTED NONE PRSNT: CPT | Mod: CPTII,S$GLB,, | Performed by: INTERNAL MEDICINE

## 2023-07-07 PROCEDURE — 83036 HEMOGLOBIN GLYCOSYLATED A1C: CPT | Performed by: INTERNAL MEDICINE

## 2023-07-07 PROCEDURE — 99214 PR OFFICE/OUTPT VISIT, EST, LEVL IV, 30-39 MIN: ICD-10-PCS | Mod: S$GLB,,, | Performed by: INTERNAL MEDICINE

## 2023-07-07 PROCEDURE — 1159F PR MEDICATION LIST DOCUMENTED IN MEDICAL RECORD: ICD-10-PCS | Mod: CPTII,S$GLB,, | Performed by: INTERNAL MEDICINE

## 2023-07-07 PROCEDURE — 3288F PR FALLS RISK ASSESSMENT DOCUMENTED: ICD-10-PCS | Mod: CPTII,S$GLB,, | Performed by: INTERNAL MEDICINE

## 2023-07-07 RX ORDER — TAMSULOSIN HYDROCHLORIDE 0.4 MG/1
2 CAPSULE ORAL
COMMUNITY
Start: 2023-04-15

## 2023-07-07 RX ORDER — METOPROLOL TARTRATE 50 MG/1
50 TABLET ORAL 2 TIMES DAILY
COMMUNITY
Start: 2023-05-10

## 2023-07-07 NOTE — PROGRESS NOTES
Subjective:       Patient ID: Claude T Dupuis is a 89 y.o. male.    Chief Complaint: Follow-up    HPI    89-year-old male here for follow-up.    Diabetes-diet and exercise control.  He does not check his BG at home.  Lab Results   Component Value Date    HGBA1C 7.5 (H) 11/15/2022     Lab Results   Component Value Date    LDLCALC 87.2 10/26/2022    CREATININE 1.1 10/26/2022     HTN -  Patient's co morbidities include:  Diabetes. Patient is currently on lisinopril 20 mg, Lopressor 100 mg b.i.d.. He does occasionally check his BP at home, but not lately. Side effects of medications note: none. Denies headaches, blurred vision, chest pain, shortness of breath, nausea.    Patient has peripheral arterial disease and is on Lipitor 40 mg.    Patient has chronic DVT and is on no current anticoagulation.    He noticed the sore on his butt at least a year ago. He has been treating this with aquafor.  This is staying the same and hurts when he sits down.  There is some blood that marks his underwear.    Review of Systems      Objective:      Physical Exam  Vitals reviewed.   Constitutional:       Appearance: He is well-developed.   HENT:      Head: Normocephalic and atraumatic.      Mouth/Throat:      Pharynx: No oropharyngeal exudate.   Eyes:      General: No scleral icterus.        Right eye: No discharge.         Left eye: No discharge.      Pupils: Pupils are equal, round, and reactive to light.   Neck:      Thyroid: No thyromegaly.      Trachea: No tracheal deviation.   Cardiovascular:      Rate and Rhythm: Normal rate and regular rhythm.      Heart sounds: Normal heart sounds. No murmur heard.    No friction rub. No gallop.   Pulmonary:      Effort: Pulmonary effort is normal. No respiratory distress.      Breath sounds: Normal breath sounds. No wheezing or rales.   Chest:      Chest wall: No tenderness.   Abdominal:      General: Bowel sounds are normal. There is no distension.      Palpations: Abdomen is soft. There is  no mass.      Tenderness: There is no abdominal tenderness. There is no guarding or rebound.   Musculoskeletal:         General: No tenderness. Normal range of motion.      Cervical back: Normal range of motion and neck supple.        Legs:    Skin:     General: Skin is warm and dry.      Coloration: Skin is not pale.      Findings: No erythema or rash.   Neurological:      Mental Status: He is alert and oriented to person, place, and time.   Psychiatric:         Behavior: Behavior normal.       Assessment:       1. Type 2 diabetes mellitus without complication, without long-term current use of insulin  - Hemoglobin A1C; Future  - Comprehensive Metabolic Panel; Future    2. Primary hypertension    3. PAD (peripheral artery disease)    4. Chronic deep vein thrombosis (DVT) of proximal vein of lower extremity, unspecified laterality    5. Pressure injury of sacral region, stage 1      Plan:       1. Check A1c, CMP.  Diet and exercise controlled.  2.  Continue Lopressor 100 mg b.i.d., lisinopril 20 mg daily.    3.  Continue Lipitor 40 mg p.o.   4.  Monitor.    5. Recommend patient increase physical activity, use pressure bandage, and Calmoseptine

## 2023-07-28 ENCOUNTER — PATIENT MESSAGE (OUTPATIENT)
Dept: CARDIOLOGY | Facility: CLINIC | Age: 88
End: 2023-07-28
Payer: MEDICARE

## 2023-09-27 ENCOUNTER — OFFICE VISIT (OUTPATIENT)
Dept: CARDIOLOGY | Facility: CLINIC | Age: 88
End: 2023-09-27
Payer: MEDICARE

## 2023-09-27 VITALS
HEART RATE: 69 BPM | WEIGHT: 184.5 LBS | SYSTOLIC BLOOD PRESSURE: 188 MMHG | DIASTOLIC BLOOD PRESSURE: 83 MMHG | BODY MASS INDEX: 28.96 KG/M2 | HEIGHT: 67 IN

## 2023-09-27 DIAGNOSIS — R60.0 LOCALIZED EDEMA: ICD-10-CM

## 2023-09-27 DIAGNOSIS — I83.029 VENOUS STASIS ULCERS OF BOTH LOWER EXTREMITIES: Primary | ICD-10-CM

## 2023-09-27 DIAGNOSIS — I82.5Y1 CHRONIC DEEP VEIN THROMBOSIS (DVT) OF PROXIMAL VEIN OF RIGHT LOWER EXTREMITY: Chronic | ICD-10-CM

## 2023-09-27 DIAGNOSIS — E11.9 TYPE 2 DIABETES MELLITUS WITHOUT COMPLICATION, WITHOUT LONG-TERM CURRENT USE OF INSULIN: Chronic | ICD-10-CM

## 2023-09-27 DIAGNOSIS — L97.919 VENOUS STASIS ULCERS OF BOTH LOWER EXTREMITIES: Primary | ICD-10-CM

## 2023-09-27 DIAGNOSIS — I25.10 CORONARY ARTERY DISEASE INVOLVING NATIVE CORONARY ARTERY OF NATIVE HEART WITHOUT ANGINA PECTORIS: ICD-10-CM

## 2023-09-27 DIAGNOSIS — I70.248 ATHEROSCLEROSIS OF NATIVE ARTERIES OF LEFT LEG WITH ULCERATION OF OTHER PART OF LOWER LEG: ICD-10-CM

## 2023-09-27 DIAGNOSIS — Z95.1 HX OF CABG: ICD-10-CM

## 2023-09-27 DIAGNOSIS — L97.929 VENOUS STASIS ULCERS OF BOTH LOWER EXTREMITIES: Primary | ICD-10-CM

## 2023-09-27 DIAGNOSIS — I47.10 SUPRAVENTRICULAR TACHYCARDIA: ICD-10-CM

## 2023-09-27 DIAGNOSIS — I73.9 PAD (PERIPHERAL ARTERY DISEASE): Chronic | ICD-10-CM

## 2023-09-27 DIAGNOSIS — I83.019 VENOUS STASIS ULCERS OF BOTH LOWER EXTREMITIES: Primary | ICD-10-CM

## 2023-09-27 DIAGNOSIS — I87.2 VENOUS INSUFFICIENCY OF LEFT LOWER EXTREMITY: ICD-10-CM

## 2023-09-27 DIAGNOSIS — I10 PRIMARY HYPERTENSION: Chronic | ICD-10-CM

## 2023-09-27 DIAGNOSIS — R60.0 EDEMA OF BOTH LOWER EXTREMITIES: ICD-10-CM

## 2023-09-27 PROCEDURE — 3288F FALL RISK ASSESSMENT DOCD: CPT | Mod: CPTII,S$GLB,, | Performed by: INTERNAL MEDICINE

## 2023-09-27 PROCEDURE — 1101F PT FALLS ASSESS-DOCD LE1/YR: CPT | Mod: CPTII,S$GLB,, | Performed by: INTERNAL MEDICINE

## 2023-09-27 PROCEDURE — 99215 OFFICE O/P EST HI 40 MIN: CPT | Mod: S$GLB,,, | Performed by: INTERNAL MEDICINE

## 2023-09-27 PROCEDURE — 1125F AMNT PAIN NOTED PAIN PRSNT: CPT | Mod: CPTII,S$GLB,, | Performed by: INTERNAL MEDICINE

## 2023-09-27 PROCEDURE — 1159F MED LIST DOCD IN RCRD: CPT | Mod: CPTII,S$GLB,, | Performed by: INTERNAL MEDICINE

## 2023-09-27 PROCEDURE — 1159F PR MEDICATION LIST DOCUMENTED IN MEDICAL RECORD: ICD-10-PCS | Mod: CPTII,S$GLB,, | Performed by: INTERNAL MEDICINE

## 2023-09-27 PROCEDURE — 99215 PR OFFICE/OUTPT VISIT, EST, LEVL V, 40-54 MIN: ICD-10-PCS | Mod: S$GLB,,, | Performed by: INTERNAL MEDICINE

## 2023-09-27 PROCEDURE — 99999 PR PBB SHADOW E&M-EST. PATIENT-LVL III: ICD-10-PCS | Mod: PBBFAC,,, | Performed by: INTERNAL MEDICINE

## 2023-09-27 PROCEDURE — 99999 PR PBB SHADOW E&M-EST. PATIENT-LVL III: CPT | Mod: PBBFAC,,, | Performed by: INTERNAL MEDICINE

## 2023-09-27 PROCEDURE — 1125F PR PAIN SEVERITY QUANTIFIED, PAIN PRESENT: ICD-10-PCS | Mod: CPTII,S$GLB,, | Performed by: INTERNAL MEDICINE

## 2023-09-27 PROCEDURE — 3288F PR FALLS RISK ASSESSMENT DOCUMENTED: ICD-10-PCS | Mod: CPTII,S$GLB,, | Performed by: INTERNAL MEDICINE

## 2023-09-27 PROCEDURE — 1101F PR PT FALLS ASSESS DOC 0-1 FALLS W/OUT INJ PAST YR: ICD-10-PCS | Mod: CPTII,S$GLB,, | Performed by: INTERNAL MEDICINE

## 2023-09-27 RX ORDER — BUMETANIDE 0.5 MG/1
0.5 TABLET ORAL 2 TIMES DAILY
Qty: 180 TABLET | Refills: 3 | Status: SHIPPED | OUTPATIENT
Start: 2023-09-27 | End: 2024-09-26

## 2023-09-27 RX ORDER — LATANOPROST 50 UG/ML
1 SOLUTION/ DROPS OPHTHALMIC NIGHTLY
COMMUNITY
Start: 2023-07-12

## 2023-09-27 NOTE — PATIENT INSTRUCTIONS
Assessment/Plan:  Claude T Dupuis is a 89 y.o. male with PAD, CAD s/p CABG (2007), HTN, colon cancer s/p cancer, Guillain [place] Syndrome, who presents for a follow up appointment.    1. PAD with LLE Wound/Ulcer- Likely due to burn with possible underlying venous insufficiency.  Now with new BLE wounds.  Check BLE venous ultrasound.  Refer to wound care.  Start bumex 0.5 mg bid.  Check cmp in 1 week.  Continue atorvastatin 40 mg daily, ASA, 81 mg daily and bumex 0.5 mg bid.  Elevate legs when resting.  Continue to monitor. Start graduated compression hose after wounds have healed.        2. CAD s/p CABD- No angina currently.  Pt with episodes of Right Arm Pain/Confusion- Etiology unclear.  Concerning for seizure.   Must also consider myocardial ischemia, arrhythmia, and TIA.  Check PET stress test, echo, carotid ultrasound and 30 day event monitor.  Continue ASA, beta blocker ACEI, imdur.  Start atorvastatin 40 mg daily.      4. HTN- Controlled.  Continue current medications.      Follow up in 2 months

## 2023-09-27 NOTE — PROGRESS NOTES
"[facility]      Chief Complaint   Patient presents with    Coronary Artery Disease       Patient ID: Claude T Dupuis is a 89 y.o. male with PAD, CAD s/p CABG (2007), HTN, colon cancer s/p cancer, Guillain [place] Syndrome, who presents for a follow up appointment.  Pertinent history/events are as follows:     -Pt presents for evaluation of left leg wound/ulcer.      -At our initial clinic visit on 9/7/2022: Mr. Cox is accompanied by his son.  He reports 6 weeks ago he "burned his left leg with hot water while getting into the shower.  States a large blister formed at the left lower leg.  He notes pain and burning sensation at the left lower leg.  States he went to the ED at [facility] 10 days after the injury and they "popped the blister and gave me antibiotics".   Plan:   LLE Wound/Ulcer- Likely due to burn with possible underlying venous insufficiency.  Check BLE venous reflux study, toe pressure study and arterial ultrasound.  Refer to wound care.  Treat with doxycycline 100 mg bid for 14 days.  Start bumex 0.5 mg bid.  Check cmp today and in 1 week.  Elevate legs when resting.  Pt to use Tylenol for pain.   CAD s/p CABD- No angina currently.  Continue current medications.  HTN- Controlled.  Continue current medications.     -At follow up clinic visit on 9/28/2022, Mr. Cox reports feeling much better.  He has no chest pain or SOB.  Review of wound care images shows the LLE wound is healing slowly.  Toe Pressure Study on 9/16/2022 demonstrated Left Toe Pressure 23 mmHg; Right Toe Pressure 57 mmHg.  BLE Arterial Ultrasound on 9/16/2022 revealed scattered atherosclerotic plaque throughout the bilateral lower extremity arteries.  Right tibio peroneal trunk artery is occluded in the distal segment.  Right anterior tibial artery stenosis, greater than 75%.  Right posterior tibial artery is occluded.  Left tibio peroneal trunk artery stenosis, 50-75%.  Left anterior tibial artery stenosis, greater " "than 75%. Left posterior tibial artery is occluded.  BLE Venous Reflux Study on 9/19/2022 revealed bilateral GSV reflux with no DVT.  The right smaller saphenous vein is noncompressible, consistent with superficial venous thrombosis (SVT)  Plan:   PAD with LLE Wound/Ulcer- Likely due to burn with possible underlying venous insufficiency.  Toe Pressure Study on 9/16/2022 demonstrated Left Toe Pressure 23 mmHg; Right Toe Pressure 57 mmHg.  BLE Arterial Ultrasound on 9/16/2022 revealed scattered atherosclerotic plaque throughout the bilateral lower extremity arteries.  Right tibio peroneal trunk artery is occluded in the distal segment.  Right anterior tibial artery stenosis, greater than 75%.  Right posterior tibial artery is occluded.  Left tibio peroneal trunk artery stenosis, 50-75%.  Left anterior tibial artery stenosis, greater than 75%. Left posterior tibial artery is occluded.  Continue wound care.  Treat with doxycycline 100 mg bid for 14 days.  Continue bumex 0.5 mg bid.  Elevate legs when resting.  Continue to use Tylenol for pain.  Given significant LLE PAD, will refer for evaluation for possible LLE angio/intervention with Dr. Kothari (in the event the wound does not continue to heal appropriately).  Continue to monitor.    RLE Superficial Venous Thrombosis- BLE Venous Reflux Study on 9/19/2022 revealed bilateral GSV reflux with no DVT.  The right smaller saphenous vein is noncompressible, consistent with superficial venous thrombosis (SVT).  Treat with Xarelto 10 mg daily for 45 days.    CAD s/p CABD- No angina currently.  Continue current medications.  HTN- Controlled.  Continue current medications.      -11/2/2022 clinic visit: Mr. Cox reports episodes where he experiences sudden onset of discomfort in his left arm, follow by a period where he "feels like I'm going to pass out".  An episode was witnessed by his son yesterday.  He did not lose consciousness during the episode.  Episodes occur once " every 2 months.  He reports no chest pain or chest discomfort.  LLE wound/ulcer is healing.  Pt evaluated by Dr. Kothari on 10/31/2022, who plans to perform left greater saphenous ablation in an attempt to expedite healing of the wound and keep him in remission without the cycle of recurrent ulceration.  Plan:   PAD with LLE Wound/Ulcer- Likely due to burn with possible underlying venous insufficiency.  Toe Pressure Study on 9/16/2022 demonstrated Left Toe Pressure 23 mmHg; Right Toe Pressure 57 mmHg.  BLE Arterial Ultrasound on 9/16/2022 revealed scattered atherosclerotic plaque throughout the bilateral lower extremity arteries.  Right tibio peroneal trunk artery is occluded in the distal segment.  Right anterior tibial artery stenosis, greater than 75%.  Right posterior tibial artery is occluded.  Left tibio peroneal trunk artery stenosis, 50-75%.  Left anterior tibial artery stenosis, greater than 75%. Left posterior tibial artery is occluded.  Continue wound care.  Treat with doxycycline 100 mg bid for 14 days.  Continue bumex 0.5 mg bid.  Elevate legs when resting.  Pt evaluated by Dr. Kothari on 10/31/2022, who plans to perform left greater saphenous ablation in an attempt to expedite healing of the wound and keep him in remission without the cycle of recurrent ulceration.  Continue to monitor.   Episodes of Right Arm Pain/Confusion- Etiology unclear.  Concerning for seizure.  Must also consider myocardial ischemia, arrhythmia, and TIA.  Refer to Neurology for evaluation of possible seizure.  Check PET stress test, echo, carotid ultrasound, and 30 day event monitor.  Continue current medications.    RLE Superficial Venous Thrombosis- BLE Venous Reflux Study on 9/19/2022 revealed bilateral GSV reflux with no DVT.  The right smaller saphenous vein is noncompressible, consistent with superficial venous thrombosis (SVT).  Treat with Xarelto 10 mg daily for 45 days.    CAD s/p CABD- No angina currently.  Pt with  "episodes of Right Arm Pain/Confusion- Etiology unclear.  Concerning for seizure.   Must also consider myocardial ischemia, arrhythmia, and TIA.  Check PET stress test, echo, carotid ultrasound and 30 day event monitor.  Continue ASA, beta blocker ACEI, imdur.  Start atorvastatin 40 mg daily.    HTN- Controlled.  Continue current medications.      -4/5/2023 clinic visit: Mr. Cox reports no further episodes where he experiences sudden onset of discomfort in his left arm, follow by a period where he "feels like I'm going to pass out".  Leg wounds have completely healed.  Cardiac Event Monitor on 11/11/2022 revealed sinus rhythm/sinus bradycardia with rare PVCs.  Echo on 11/11/2022: demonstrated EF 65%; there are segmental left ventricular wall motion abnormalities; normal right ventricular size with normal right ventricular systolic function; indeterminate left ventricular diastolic function; mild left atrial enlargement.  Carotid Ultrasound on 11/3/2022 demonstrated no evidence of significant ICA stenosis (less than 50%) bilaterally.  Vertebral flow is antegrade bilaterally.  Plan:   PAD with LLE Wound/Ulcer- Likely due to burn with possible underlying venous insufficiency.  Leg wounds have completely healed.  Start atorvastatin 40 mg daily.  Continue ASA, 81 mg daily and bumex 0.5 mg bid.  Elevate legs when resting.  Continue to monitor. Start graduated compression hose.    Episodes of Right Arm Pain/Confusion- Etiology unclear.  Now resolved.  Concerning for seizure.  Must also consider myocardial ischemia, arrhythmia, and TIA.  Cardiac Event Monitor on 11/11/2022 revealed sinus rhythm/sinus bradycardia with rare PVCs.  Echo on 11/11/2022: demonstrated EF 65%; there are segmental left ventricular wall motion abnormalities; normal right ventricular size with normal right ventricular systolic function; indeterminate left ventricular diastolic function; mild left atrial enlargement.  Carotid Ultrasound on 11/3/2022 " demonstrated no evidence of significant ICA stenosis (less than 50%) bilaterally.  Vertebral flow is antegrade bilaterally.  Pt states he does not want to have stress test done.  Pt referred to Neurology for evaluation of possible seizure.  Continue current medications.    RLE Superficial Venous Thrombosis- BLE Venous Reflux Study on 9/19/2022 revealed bilateral GSV reflux with no DVT.  The right smaller saphenous vein is noncompressible, consistent with superficial venous thrombosis (SVT).  Pt treated with Xarelto 10 mg daily for 45 days.    CAD s/p CABD- No angina currently.  Pt with episodes of Right Arm Pain/Confusion- Etiology unclear.  Concerning for seizure.   Must also consider myocardial ischemia, arrhythmia, and TIA.  Check PET stress test, echo, carotid ultrasound and 30 day event monitor.  Continue ASA, beta blocker ACEI, imdur.  Start atorvastatin 40 mg daily.    HTN- Controlled.  Continue current medications.      HPI:  Mr. Cox reports BLE wounds/ulcers which started 2 months ago.  He does not ambulate much and has no claudication symptoms.      Past Medical History:   Diagnosis Date    Cancer     Colon cancer     Coronary artery disease     Guillain-[place] syndrome     Hx of CABG 09/07/2022    Hypertension     PAD (peripheral artery disease) 09/28/2022    Type 2 diabetes mellitus without complications      Past Surgical History:   Procedure Laterality Date    COLON SURGERY  2005    CORONARY ARTERY BYPASS GRAFT  2007    EYE SURGERY      VEIN BYPASS SURGERY       Social History     Socioeconomic History    Marital status:    Tobacco Use    Smoking status: Never     Passive exposure: Never    Smokeless tobacco: Never   Substance and Sexual Activity    Alcohol use: No    Drug use: No    Sexual activity: Not Currently     Partners: Female     Family History   Problem Relation Age of Onset    Diabetes Mother        Review of patient's allergies indicates:   Allergen Reactions    Fluzone high-dose  "[id number] [influenza vaccine tr-s 09 (pf)]      Had tomasa ocampo       Medication List with Changes/Refills   Current Medications    ASPIRIN 325 MG TABLET    Take 325 mg by mouth once daily.    ATORVASTATIN (LIPITOR) 40 MG TABLET    Take 1 tablet (40 mg total) by mouth once daily.    BUMETANIDE (BUMEX) 0.5 MG TAB    Take 1 tablet (0.5 mg total) by mouth 2 (two) times a day.    ISOSORBIDE MONONITRATE (IMDUR) 30 MG 24 HR TABLET    TAKE 1 TABLET(30 MG) BY MOUTH EVERY DAY    LATANOPROST 0.005 % OPHTHALMIC SOLUTION    Place 1 drop into both eyes every evening.    LEVOTHYROXINE (SYNTHROID) 88 MCG TABLET    Take 1 tablet (88 mcg total) by mouth before breakfast.    METOPROLOL TARTRATE (LOPRESSOR) 100 MG TABLET    Take 100 mg by mouth 2 (two) times daily.    METOPROLOL TARTRATE (LOPRESSOR) 50 MG TABLET    Take 50 mg by mouth 2 (two) times daily.    TAMSULOSIN (FLOMAX) 0.4 MG CAP    Take 2 capsules by mouth.   Discontinued Medications    LISINOPRIL (PRINIVIL,ZESTRIL) 20 MG TABLET    Take 20 mg by mouth once daily.       Review of Systems  Constitution: Denies chills, fever, and sweats.  HENT: Denies headaches or blurry vision.  Cardiovascular: Denies chest pain or irregular heart beat.  Respiratory: Denies cough or shortness of breath.  Gastrointestinal: Denies abdominal pain, nausea, or vomiting.  Musculoskeletal: Denies muscle cramps.  Neurological: Positive for left leg wound with pain and swelling.  Psychiatric/Behavioral: Normal mental status.  Hematologic/Lymphatic: Denies bleeding problem or easy bruising/bleeding.  Skin: Denies rash or suspicious lesions    Physical Examination  BP (!) 188/83   Pulse 69   Ht 5' 7" (1.702 m)   Wt 83.7 kg (184 lb 8.4 oz)   BMI 28.90 kg/m²     Constitutional: No acute distress, conversant  HEENT: Sclera anicteric, Pupils equal, round and reactive to light, extraocular motions intact, Oropharynx clear  Neck: No JVD, no carotid bruits  Cardiovascular: regular rate and rhythm, no " "murmur, rubs or gallops, normal S1/S2  Pulmonary: Clear to auscultation bilaterally  Abdominal: Abdomen soft, nontender, nondistended, positive bowel sounds  Extremities: BLE's with 1 pitting edema and venous stasis dermatitis  LLE with 4 x 5.5 cm ulcer/wound at medial aspect near ankle   Pulses:  Carotid pulses are 2+ on the right side, and 2+ on the left side.  Radial pulses are 2+ on the right side, and 2+ on the left side.   Femoral pulses are 2+ on the right side, and 2+ on the left side.  Popliteal pulses are 2+ on the right side, and 2+ on the left side.   Dorsalis pedis pulses are 2+ on the right side, and 2+ on the left side.   Posterior tibial pulses are 2+ on the right side, and 2+ on the left side.    Skin: No ecchymosis, erythema, or ulcers  Psych: Alert and oriented x 3, appropriate affect  Neuro: CNII-XII intact, no focal deficits    Labs:  Most Recent Data  CBC: No results found for: "WBC", "HGB", "HCT", "PLT", "MCV", "RDW"  BMP:   Lab Results   Component Value Date     07/07/2023    K 4.4 07/07/2023     07/07/2023    CO2 28 07/07/2023    BUN 20 07/07/2023    CREATININE 0.9 07/07/2023     07/07/2023    CALCIUM 9.8 07/07/2023     LFTS;   Lab Results   Component Value Date    PROT 7.3 07/07/2023    ALBUMIN 3.6 07/07/2023    BILITOT 1.0 07/07/2023    AST 22 07/07/2023    ALKPHOS 96 07/07/2023    ALT 13 07/07/2023     COAGS: No results found for: "INR", "PROTIME", "PTT"  FLP:   Lab Results   Component Value Date    CHOL 147 10/26/2022    HDL 46 10/26/2022    LDLCALC 87.2 10/26/2022    TRIG 69 10/26/2022    CHOLHDL 31.3 10/26/2022     CARDIAC: No results found for: "TROPONINI", "CKTOTAL", "CKMB", "BNP"    Cardiac Event Monitor 11/11/2022:  At baseline, in the absence of symptoms, the rhythm was sinus with PVCs at 78 beats per minute.  There was 1 activation for symptoms of dizziness and lightheadedness on 11/15 at 10:12 a.m..  This showed sinus bradycardia at 50 beats per minute.  Other " activations during which time no symptom was reported all showed sinus rhythm with rates ranging between 56 and 78 beats per minute.     Impression:  Sinus rhythm/sinus bradycardia.  Rare PVCs.    Echo 11/11/2022:  The left ventricle is normal in size with normal systolic function. The estimated ejection fraction is 65%.  There are segmental left ventricular wall motion abnormalities.  Normal right ventricular size with normal right ventricular systolic function.  Indeterminate left ventricular diastolic function.  Mild left atrial enlargement.    Carotid Ultrasound 11/3/2022:  Bilateral carotid atherosclerosis is present.  No evidence of significant ICA stenosis (less than 50%) bilaterally.  Vertebral flow is antegrade bilaterally.    Toe Pressure Study 9/16/2022:  Right TBI 0.35, suggestive of severe right lower extremity arterial disease.  Left TBI 0.14, is suggestive of severe left lower extremity arterial disease.  Digit waveforms are mildly dampened on the left.  Left Toe Pressure 23 mmHg; Right Toe Pressure 57 mmHg    BLE Arterial Ultrasound 9/16/2022:  There is scattered atherosclerotic plaque throughout the bilateral lower extremity arteries.  Right tibio peroneal trunk artery is occluded in the distal segment.  Right anterior tibial artery stenosis, greater than 75%.  Right posterior tibial artery is occluded.  Right peroneal artery not visualized, possibly occluded.  Left tibio peroneal trunk artery stenosis, 50-75%.  Left anterior tibial artery stenosis, greater than 75%.  Left posterior tibial artery is occluded.    BLE Venous Reflux Study 9/19/2022  There is no evidence of a right lower extremity DVT.  The right smaller saphenous vein is noncompressible, consistent with superficial venous thrombosis (SVT).  The right greater saphenous vein has reflux.  There is no evidence of a left lower extremity DVT.  The left greater saphenous vein has reflux.    Assessment/Plan:  Claude T Dupuis is a 89 y.o. male  "with PAD, CAD s/p CABG (2007), HTN, colon cancer s/p cancer, Guillain [place] Syndrome, who presents for a follow up appointment.    1. PAD with LLE Wound/Ulcer- Likely due to burn with possible underlying venous insufficiency.  Now with new BLE wounds.  Check BLE venous ultrasound.  Refer to wound care.  Continue atorvastatin 40 mg daily, ASA, 81 mg daily and bumex 0.5 mg bid.  Elevate legs when resting.  Continue to monitor. Start graduated compression hose after wounds have healed.        2. CAD s/p CABD- No angina currently.  Pt with episodes of Right Arm Pain/Confusion- Etiology unclear.  Concerning for seizure.   Must also consider myocardial ischemia, arrhythmia, and TIA.  Check PET stress test, echo, carotid ultrasound and 30 day event monitor.  Continue ASA, beta blocker ACEI, imdur.  Start atorvastatin 40 mg daily.      4. HTN- Controlled.  Continue current medications.      Follow up in 2 months     Total duration of face to face visit time 30 minutes.  Total time spent counseling greater than fifty percent of total visit time.  Counseling included discussion regarding imaging findings, diagnosis, possibilities, treatment options, risks and benefits.  The patient had many questions regarding the options and long-term effects.    Jose Antonio Lewis MD, PhD  Interventional Cardiology          [facility]      Chief Complaint   Patient presents with    Coronary Artery Disease       Patient ID: Claude T Dupuis is a 89 y.o. male with PAD, CAD s/p CABG (2007), HTN, colon cancer s/p cancer, Guillain [place] Syndrome, who presents for a follow up appointment.  Pertinent history/events are as follows:     -Pt presents for evaluation of left leg wound/ulcer.      -At our initial clinic visit on 9/7/2022: Mr. Cox is accompanied by his son.  He reports 6 weeks ago he "burned his left leg with hot water while getting into the shower.  States a large blister formed at the left lower leg.  He notes pain " "and burning sensation at the left lower leg.  States he went to the ED at [facility] 10 days after the injury and they "popped the blister and gave me antibiotics".   Plan:   LLE Wound/Ulcer- Likely due to burn with possible underlying venous insufficiency.  Check BLE venous reflux study, toe pressure study and arterial ultrasound.  Refer to wound care.  Treat with doxycycline 100 mg bid for 14 days.  Start bumex 0.5 mg bid.  Check cmp today and in 1 week.  Elevate legs when resting.  Pt to use Tylenol for pain.   CAD s/p CABD- No angina currently.  Continue current medications.  HTN- Controlled.  Continue current medications.     -At follow up clinic visit on 9/28/2022, Mr. Cox reports feeling much better.  He has no chest pain or SOB.  Review of wound care images shows the LLE wound is healing slowly.  Toe Pressure Study on 9/16/2022 demonstrated Left Toe Pressure 23 mmHg; Right Toe Pressure 57 mmHg.  BLE Arterial Ultrasound on 9/16/2022 revealed scattered atherosclerotic plaque throughout the bilateral lower extremity arteries.  Right tibio peroneal trunk artery is occluded in the distal segment.  Right anterior tibial artery stenosis, greater than 75%.  Right posterior tibial artery is occluded.  Left tibio peroneal trunk artery stenosis, 50-75%.  Left anterior tibial artery stenosis, greater than 75%. Left posterior tibial artery is occluded.  BLE Venous Reflux Study on 9/19/2022 revealed bilateral GSV reflux with no DVT.  The right smaller saphenous vein is noncompressible, consistent with superficial venous thrombosis (SVT)  Plan:   PAD with LLE Wound/Ulcer- Likely due to burn with possible underlying venous insufficiency.  Toe Pressure Study on 9/16/2022 demonstrated Left Toe Pressure 23 mmHg; Right Toe Pressure 57 mmHg.  BLE Arterial Ultrasound on 9/16/2022 revealed scattered atherosclerotic plaque throughout the bilateral lower extremity arteries.  Right tibio peroneal trunk artery is occluded in " "the distal segment.  Right anterior tibial artery stenosis, greater than 75%.  Right posterior tibial artery is occluded.  Left tibio peroneal trunk artery stenosis, 50-75%.  Left anterior tibial artery stenosis, greater than 75%. Left posterior tibial artery is occluded.  Continue wound care.  Treat with doxycycline 100 mg bid for 14 days.  Continue bumex 0.5 mg bid.  Elevate legs when resting.  Continue to use Tylenol for pain.  Given significant LLE PAD, will refer for evaluation for possible LLE angio/intervention with Dr. Kothari (in the event the wound does not continue to heal appropriately).  Continue to monitor.    RLE Superficial Venous Thrombosis- BLE Venous Reflux Study on 9/19/2022 revealed bilateral GSV reflux with no DVT.  The right smaller saphenous vein is noncompressible, consistent with superficial venous thrombosis (SVT).  Treat with Xarelto 10 mg daily for 45 days.    CAD s/p CABD- No angina currently.  Continue current medications.  HTN- Controlled.  Continue current medications.      -11/2/2022 clinic visit: Mr. Cox reports episodes where he experiences sudden onset of discomfort in his left arm, follow by a period where he "feels like I'm going to pass out".  An episode was witnessed by his son yesterday.  He did not lose consciousness during the episode.  Episodes occur once every 2 months.  He reports no chest pain or chest discomfort.  LLE wound/ulcer is healing.  Pt evaluated by Dr. Kothari on 10/31/2022, who plans to perform left greater saphenous ablation in an attempt to expedite healing of the wound and keep him in remission without the cycle of recurrent ulceration.  Plan:   PAD with LLE Wound/Ulcer- Likely due to burn with possible underlying venous insufficiency.  Toe Pressure Study on 9/16/2022 demonstrated Left Toe Pressure 23 mmHg; Right Toe Pressure 57 mmHg.  BLE Arterial Ultrasound on 9/16/2022 revealed scattered atherosclerotic plaque throughout the bilateral lower " "extremity arteries.  Right tibio peroneal trunk artery is occluded in the distal segment.  Right anterior tibial artery stenosis, greater than 75%.  Right posterior tibial artery is occluded.  Left tibio peroneal trunk artery stenosis, 50-75%.  Left anterior tibial artery stenosis, greater than 75%. Left posterior tibial artery is occluded.  Continue wound care.  Treat with doxycycline 100 mg bid for 14 days.  Continue bumex 0.5 mg bid.  Elevate legs when resting.  Pt evaluated by Dr. Kothari on 10/31/2022, who plans to perform left greater saphenous ablation in an attempt to expedite healing of the wound and keep him in remission without the cycle of recurrent ulceration.  Continue to monitor.   Episodes of Right Arm Pain/Confusion- Etiology unclear.  Concerning for seizure.  Must also consider myocardial ischemia, arrhythmia, and TIA.  Refer to Neurology for evaluation of possible seizure.  Check PET stress test, echo, carotid ultrasound, and 30 day event monitor.  Continue current medications.    RLE Superficial Venous Thrombosis- BLE Venous Reflux Study on 9/19/2022 revealed bilateral GSV reflux with no DVT.  The right smaller saphenous vein is noncompressible, consistent with superficial venous thrombosis (SVT).  Treat with Xarelto 10 mg daily for 45 days.    CAD s/p CABD- No angina currently.  Pt with episodes of Right Arm Pain/Confusion- Etiology unclear.  Concerning for seizure.   Must also consider myocardial ischemia, arrhythmia, and TIA.  Check PET stress test, echo, carotid ultrasound and 30 day event monitor.  Continue ASA, beta blocker ACEI, imdur.  Start atorvastatin 40 mg daily.    HTN- Controlled.  Continue current medications.        HPI:  Mr. Cox reports no further episodes where he experiences sudden onset of discomfort in his left arm, follow by a period where he "feels like I'm going to pass out".  Leg wounds have completely healed.  Cardiac Event Monitor on 11/11/2022 revealed sinus " rhythm/sinus bradycardia with rare PVCs.  Echo on 11/11/2022: demonstrated EF 65%; there are segmental left ventricular wall motion abnormalities; normal right ventricular size with normal right ventricular systolic function; indeterminate left ventricular diastolic function; mild left atrial enlargement.  Carotid Ultrasound on 11/3/2022 demonstrated no evidence of significant ICA stenosis (less than 50%) bilaterally.  Vertebral flow is antegrade bilaterally.    Past Medical History:   Diagnosis Date    Cancer     Colon cancer     Coronary artery disease     Guillain-Cazenovia syndrome     Hx of CABG 09/07/2022    Hypertension     PAD (peripheral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

## 2023-09-28 ENCOUNTER — TELEPHONE (OUTPATIENT)
Dept: WOUND CARE | Facility: CLINIC | Age: 88
End: 2023-09-28
Payer: MEDICARE

## 2023-09-28 NOTE — TELEPHONE ENCOUNTER
Call spoke with the wife, she stated that the patient was sleeping asked for my number because she didn't wont to wake the patient. Phone number to my desk was given. If I don't hear back from the patient after lunch I'll reach out to him again or call the son Anton.

## 2023-09-28 NOTE — TELEPHONE ENCOUNTER
Spoke with the patient and inform him that we don't do transportation and I did reach out his son Anton no answer. Left message in full detail on what my request was, message was left on the [phone]. I'll try again on tomorrow to reach out to his son Anton.
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
## 2023-09-29 ENCOUNTER — TELEPHONE (OUTPATIENT)
Dept: WOUND CARE | Facility: CLINIC | Age: 88
End: 2023-09-29
Payer: MEDICARE

## 2023-10-04 ENCOUNTER — LAB VISIT (OUTPATIENT)
Dept: LAB | Facility: HOSPITAL | Age: 88
End: 2023-10-04
Attending: INTERNAL MEDICINE
Payer: MEDICARE

## 2023-10-04 DIAGNOSIS — I83.029 VENOUS STASIS ULCERS OF BOTH LOWER EXTREMITIES: ICD-10-CM

## 2023-10-04 DIAGNOSIS — L97.929 VENOUS STASIS ULCERS OF BOTH LOWER EXTREMITIES: ICD-10-CM

## 2023-10-04 DIAGNOSIS — I83.019 VENOUS STASIS ULCERS OF BOTH LOWER EXTREMITIES: ICD-10-CM

## 2023-10-04 DIAGNOSIS — L97.919 VENOUS STASIS ULCERS OF BOTH LOWER EXTREMITIES: ICD-10-CM

## 2023-10-04 LAB
ALBUMIN SERPL BCP-MCNC: 3.3 G/DL (ref 3.5–5.2)
ALP SERPL-CCNC: 105 U/L (ref 55–135)
ALT SERPL W/O P-5'-P-CCNC: 14 U/L (ref 10–44)
ANION GAP SERPL CALC-SCNC: 12 MMOL/L (ref 8–16)
AST SERPL-CCNC: 18 U/L (ref 10–40)
BILIRUB SERPL-MCNC: 0.8 MG/DL (ref 0.1–1)
BUN SERPL-MCNC: 15 MG/DL (ref 10–30)
CALCIUM SERPL-MCNC: 9.3 MG/DL (ref 8.7–10.5)
CHLORIDE SERPL-SCNC: 104 MMOL/L (ref 95–110)
CO2 SERPL-SCNC: 26 MMOL/L (ref 23–29)
CREAT SERPL-MCNC: 1 MG/DL (ref 0.5–1.4)
EST. GFR  (NO RACE VARIABLE): >60 ML/MIN/1.73 M^2
GLUCOSE SERPL-MCNC: 220 MG/DL (ref 70–110)
POTASSIUM SERPL-SCNC: 3.8 MMOL/L (ref 3.5–5.1)
PROT SERPL-MCNC: 6.8 G/DL (ref 6–8.4)
SODIUM SERPL-SCNC: 142 MMOL/L (ref 136–145)

## 2023-10-04 PROCEDURE — 36415 COLL VENOUS BLD VENIPUNCTURE: CPT | Mod: PO | Performed by: INTERNAL MEDICINE

## 2023-10-04 PROCEDURE — 80053 COMPREHEN METABOLIC PANEL: CPT | Performed by: INTERNAL MEDICINE

## 2023-10-05 ENCOUNTER — OFFICE VISIT (OUTPATIENT)
Dept: WOUND CARE | Facility: CLINIC | Age: 88
End: 2023-10-05
Payer: MEDICARE

## 2023-10-05 VITALS
WEIGHT: 184 LBS | SYSTOLIC BLOOD PRESSURE: 161 MMHG | TEMPERATURE: 98 F | HEART RATE: 74 BPM | BODY MASS INDEX: 28.88 KG/M2 | HEIGHT: 67 IN | DIASTOLIC BLOOD PRESSURE: 70 MMHG

## 2023-10-05 DIAGNOSIS — L97.929 VENOUS STASIS ULCERS OF BOTH LOWER EXTREMITIES: Primary | ICD-10-CM

## 2023-10-05 DIAGNOSIS — I10 HYPERTENSION, UNSPECIFIED TYPE: ICD-10-CM

## 2023-10-05 DIAGNOSIS — L03.90 WOUND CELLULITIS: ICD-10-CM

## 2023-10-05 DIAGNOSIS — I50.31 ACUTE DIASTOLIC HEART FAILURE: ICD-10-CM

## 2023-10-05 DIAGNOSIS — S91.302A OPEN WOUND OF LEFT FOOT, INITIAL ENCOUNTER: ICD-10-CM

## 2023-10-05 DIAGNOSIS — L97.919 VENOUS STASIS ULCERS OF BOTH LOWER EXTREMITIES: Primary | ICD-10-CM

## 2023-10-05 DIAGNOSIS — R60.0 EDEMA OF BOTH LOWER EXTREMITIES: ICD-10-CM

## 2023-10-05 DIAGNOSIS — I87.2 VENOUS STASIS DERMATITIS OF BOTH LOWER EXTREMITIES: ICD-10-CM

## 2023-10-05 DIAGNOSIS — I83.029 VENOUS STASIS ULCERS OF BOTH LOWER EXTREMITIES: Primary | ICD-10-CM

## 2023-10-05 DIAGNOSIS — I73.9 PAD (PERIPHERAL ARTERY DISEASE): ICD-10-CM

## 2023-10-05 DIAGNOSIS — I83.019 VENOUS STASIS ULCERS OF BOTH LOWER EXTREMITIES: Primary | ICD-10-CM

## 2023-10-05 PROCEDURE — 1101F PT FALLS ASSESS-DOCD LE1/YR: CPT | Mod: CPTII,S$GLB,,

## 2023-10-05 PROCEDURE — 99215 PR OFFICE/OUTPT VISIT, EST, LEVL V, 40-54 MIN: ICD-10-PCS | Mod: S$GLB,,,

## 2023-10-05 PROCEDURE — 99999 PR PBB SHADOW E&M-EST. PATIENT-LVL V: ICD-10-PCS | Mod: PBBFAC,,,

## 2023-10-05 PROCEDURE — 1101F PR PT FALLS ASSESS DOC 0-1 FALLS W/OUT INJ PAST YR: ICD-10-PCS | Mod: CPTII,S$GLB,,

## 2023-10-05 PROCEDURE — 3288F FALL RISK ASSESSMENT DOCD: CPT | Mod: CPTII,S$GLB,,

## 2023-10-05 PROCEDURE — 99215 OFFICE O/P EST HI 40 MIN: CPT | Mod: S$GLB,,,

## 2023-10-05 PROCEDURE — 1159F PR MEDICATION LIST DOCUMENTED IN MEDICAL RECORD: ICD-10-PCS | Mod: CPTII,S$GLB,,

## 2023-10-05 PROCEDURE — 1125F PR PAIN SEVERITY QUANTIFIED, PAIN PRESENT: ICD-10-PCS | Mod: CPTII,S$GLB,,

## 2023-10-05 PROCEDURE — 3288F PR FALLS RISK ASSESSMENT DOCUMENTED: ICD-10-PCS | Mod: CPTII,S$GLB,,

## 2023-10-05 PROCEDURE — 1125F AMNT PAIN NOTED PAIN PRSNT: CPT | Mod: CPTII,S$GLB,,

## 2023-10-05 PROCEDURE — 1159F MED LIST DOCD IN RCRD: CPT | Mod: CPTII,S$GLB,,

## 2023-10-05 PROCEDURE — 99999 PR PBB SHADOW E&M-EST. PATIENT-LVL V: CPT | Mod: PBBFAC,,,

## 2023-10-05 RX ORDER — DOXYCYCLINE HYCLATE 100 MG
100 TABLET ORAL EVERY 12 HOURS
Qty: 28 TABLET | Refills: 0 | Status: SHIPPED | OUTPATIENT
Start: 2023-10-05 | End: 2023-10-19

## 2023-10-05 NOTE — PROGRESS NOTES
Subjective:       Patient ID: Claude T Dupuis is a 89 y.o. male     Chief Complaint: Wound Consult and Wound Infection      Patient presents with his son for an evaluation of a bilateral lower extremity wounds and left foot wound. Patient follows with Dr. Lewis for PAD. Patient followed by Dr. Kothari- left greater saphenous ablation was scheduled for 12/22/22. Pt cancelled appointment. He is unsure when the wounds formed anywhere from March - May. Patient has been dressing his wounds with urgotul and cleansing wounds with vashe. He has occasionally been leaving his wounds open to air. Pt's son requests home joellen due to difficulty bringing patient to appointments. Denies fever, chills, erythema, warmth, or purulent drainage.      9/7/22 CV Toe Pressures:   · Right TBI 0.35, suggestive of severe right lower extremity arterial disease.  · Left TBI 0.14, is suggestive of severe left lower extremity arterial disease.  · Digit waveforms are mildly dampened on the left.    9/7/22 Ultrasound Doppler Arterial:  · There is scattered atherosclerotic plaque throughout the bilateral lower extremity arteries.  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  · Right anterior tibial artery stenosis, greater than 75%.  · Right posterior tibial artery is occluded.  · Right peroneal artery not visualized, possibly occluded.  · Left tibio peroneal trunk artery stenosis, 50-75%.  · Left anterior tibial artery stenosis, greater than 75%.  · Left posterior tibial artery is occluded.      Review of Systems   Constitutional:  Negative for activity change, chills, diaphoresis, fatigue and fever.   Respiratory:  Negative for apnea, chest tightness and shortness of breath.    Cardiovascular:  Positive for leg swelling. Negative for chest pain and palpitations.   Musculoskeletal:  Negative for gait problem and joint swelling.   Skin:  Positive for wound. Negative for pallor and rash.   Neurological:  Negative for  syncope, weakness and numbness.   Psychiatric/Behavioral:  Negative for agitation. The patient is not nervous/anxious.    All other systems reviewed and are negative.      Objective:      Physical Exam  Vitals reviewed.   Constitutional:       General: He is not in acute distress.     Appearance: Normal appearance.   HENT:      Right Ear: Decreased hearing noted.      Left Ear: Decreased hearing noted.   Cardiovascular:      Rate and Rhythm: Normal rate and regular rhythm.   Pulmonary:      Effort: No respiratory distress.   Musculoskeletal:         General: No swelling.      Right lower leg: Edema present.      Left lower leg: Edema present.   Skin:     General: Skin is warm and dry.      Findings: Wound present. No erythema.          Neurological:      General: No focal deficit present.      Mental Status: He is alert and oriented to person, place, and time.   Psychiatric:         Mood and Affect: Mood normal.         Behavior: Behavior normal.         Thought Content: Thought content normal.         Judgment: Judgment normal.         Assessment:       1. Venous stasis ulcers of both lower extremities    2. Open wound of left foot, initial encounter    3. Wound cellulitis    4. Edema of both lower extremities    5. PAD (peripheral artery disease)    6. Acute diastolic heart failure    7. Venous stasis dermatitis of both lower extremities    8. Hypertension, unspecified type               Altered Skin Integrity Right anterior;lower Leg (Active)       Altered Skin Integrity Present on Admission - Did Patient arrive to the hospital with altered skin?:    Side: Right   Orientation: anterior;lower   Location: Leg   Wound Number:    Is this injury device related?:    Primary Wound Type:    Description of Altered Skin Integrity:    Ankle-Brachial Index:    Pulses:    Removal Indication and Assessment:    Wound Outcome:    (Retired) Wound Length (cm):    (Retired) Wound Width (cm):    (Retired) Depth (cm):    Wound  Description (Comments):    Removal Indications:    Wound Image   10/05/23 1629   Dressing Appearance Dry;Intact;Clean;Moist drainage 10/05/23 1629   Drainage Amount Large 10/05/23 1629   Drainage Characteristics/Odor Serosanguineous 10/05/23 1629   Red (%), Wound Tissue Color 100 % 10/05/23 1629   Periwound Area Intact;Edematous;Redness;Warm;Hemosiderin Staining 10/05/23 1629   Wound Length (cm) 2 cm 10/05/23 1629   Wound Width (cm) 0.4 cm 10/05/23 1629   Wound Depth (cm) 0.1 cm 10/05/23 1629   Wound Volume (cm^3) 0.08 cm^3 10/05/23 1629   Wound Surface Area (cm^2) 0.8 cm^2 10/05/23 1629   Care Cleansed with:;Soap and water 10/05/23 1629   Dressing Applied;Calcium alginate;Rolled gauze 10/05/23 1629   Periwound Care Skin barrier film applied 10/05/23 1629            Altered Skin Integrity Right lower;lateral Leg (Active)       Altered Skin Integrity Present on Admission - Did Patient arrive to the hospital with altered skin?:    Side: Right   Orientation: lower;lateral   Location: Leg   Wound Number:    Is this injury device related?:    Primary Wound Type:    Description of Altered Skin Integrity:    Ankle-Brachial Index:    Pulses:    Removal Indication and Assessment:    Wound Outcome:    (Retired) Wound Length (cm):    (Retired) Wound Width (cm):    (Retired) Depth (cm):    Wound Description (Comments):    Removal Indications:    Wound Image   10/05/23 1629   Dressing Appearance Dry;Intact;Clean;Moist drainage 10/05/23 1629   Drainage Amount Large 10/05/23 1629   Drainage Characteristics/Odor Serosanguineous 10/05/23 1629   Red (%), Wound Tissue Color 100 % 10/05/23 1629   Periwound Area Intact;Pustules;Edematous;Redness;Warm;Hemosiderin Staining 10/05/23 1629   Wound Length (cm) 4 cm 10/05/23 1629   Wound Width (cm) 1.3 cm 10/05/23 1629   Wound Depth (cm) 0.1 cm 10/05/23 1629   Wound Volume (cm^3) 0.52 cm^3 10/05/23 1629   Wound Surface Area (cm^2) 5.2 cm^2 10/05/23 1629   Care Cleansed with:;Soap and water  10/05/23 1629   Dressing Applied;Calcium alginate;Rolled gauze 10/05/23 1629   Periwound Care Skin barrier film applied 10/05/23 1629            Altered Skin Integrity Right lower;medial Leg (Active)       Altered Skin Integrity Present on Admission - Did Patient arrive to the hospital with altered skin?:    Side: Right   Orientation: lower;medial   Location: Leg   Wound Number:    Is this injury device related?:    Primary Wound Type:    Description of Altered Skin Integrity:    Ankle-Brachial Index:    Pulses:    Removal Indication and Assessment:    Wound Outcome:    (Retired) Wound Length (cm):    (Retired) Wound Width (cm):    (Retired) Depth (cm):    Wound Description (Comments):    Removal Indications:    Wound Image   10/05/23 1629   Dressing Appearance Dry;Intact;Clean;Moist drainage 10/05/23 1629   Drainage Amount Large 10/05/23 1629   Drainage Characteristics/Odor Serosanguineous 10/05/23 1629   Red (%), Wound Tissue Color 100 % 10/05/23 1629   Periwound Area Intact;Edematous;Hemosiderin Staining;Redness;Warm 10/05/23 1629   Wound Length (cm) 0.4 cm 10/05/23 1629   Wound Width (cm) 0.3 cm 10/05/23 1629   Wound Depth (cm) 0.1 cm 10/05/23 1629   Wound Volume (cm^3) 0.012 cm^3 10/05/23 1629   Wound Surface Area (cm^2) 0.12 cm^2 10/05/23 1629   Care Cleansed with:;Soap and water 10/05/23 1629   Dressing Applied;Calcium alginate;Rolled gauze 10/05/23 1629   Periwound Care Skin barrier film applied 10/05/23 1629            Altered Skin Integrity Right lower;medial;posterior Leg (Active)       Altered Skin Integrity Present on Admission - Did Patient arrive to the hospital with altered skin?:    Side: Right   Orientation: lower;medial;posterior   Location: Leg   Wound Number:    Is this injury device related?:    Primary Wound Type:    Description of Altered Skin Integrity:    Ankle-Brachial Index:    Pulses:    Removal Indication and Assessment:    Wound Outcome:    (Retired) Wound Length (cm):    (Retired)  Wound Width (cm):    (Retired) Depth (cm):    Wound Description (Comments):    Removal Indications:    Wound Image   10/05/23 1629   Dressing Appearance Dry;Intact;Clean;Moist drainage 10/05/23 1629   Drainage Amount Large 10/05/23 1629   Drainage Characteristics/Odor Serosanguineous 10/05/23 1629   Red (%), Wound Tissue Color 100 % 10/05/23 1629   Periwound Area Intact;Hemosiderin Staining;Edematous 10/05/23 1629   Wound Length (cm) 2.7 cm 10/05/23 1629   Wound Width (cm) 1.8 cm 10/05/23 1629   Wound Depth (cm) 0.1 cm 10/05/23 1629   Wound Volume (cm^3) 0.486 cm^3 10/05/23 1629   Wound Surface Area (cm^2) 4.86 cm^2 10/05/23 1629   Care Cleansed with:;Soap and water 10/05/23 1629   Dressing Applied;Calcium alginate;Rolled gauze 10/05/23 1629   Periwound Care Skin barrier film applied 10/05/23 1629            Altered Skin Integrity Left dorsal Foot (Active)       Altered Skin Integrity Present on Admission - Did Patient arrive to the hospital with altered skin?:    Side: Left   Orientation: dorsal   Location: Foot   Wound Number:    Is this injury device related?:    Primary Wound Type:    Description of Altered Skin Integrity:    Ankle-Brachial Index:    Pulses:    Removal Indication and Assessment:    Wound Outcome:    (Retired) Wound Length (cm):    (Retired) Wound Width (cm):    (Retired) Depth (cm):    Wound Description (Comments):    Removal Indications:    Wound Image   10/05/23 1629   Dressing Appearance Dry;Intact;Clean;Moist drainage 10/05/23 1629   Drainage Amount Large 10/05/23 1629   Drainage Characteristics/Odor Serosanguineous 10/05/23 1629   Red (%), Wound Tissue Color 50 % 10/05/23 1629   Yellow (%), Wound Tissue Color 50 % 10/05/23 1629   Periwound Area Intact;Edematous 10/05/23 1629   Wound Length (cm) 1.8 cm 10/05/23 1629   Wound Width (cm) 1.5 cm 10/05/23 1629   Wound Depth (cm) 0.1 cm 10/05/23 1629   Wound Volume (cm^3) 0.27 cm^3 10/05/23 1629   Wound Surface Area (cm^2) 2.7 cm^2 10/05/23 1629    Care Cleansed with:;Soap and water 10/05/23 1629   Dressing Applied;Calcium alginate;Rolled gauze 10/05/23 1629   Periwound Care Skin barrier film applied 10/05/23 1629            Altered Skin Integrity Left anterior;lower Leg (Active)       Altered Skin Integrity Present on Admission - Did Patient arrive to the hospital with altered skin?:    Side: Left   Orientation: anterior;lower   Location: Leg   Wound Number:    Is this injury device related?:    Primary Wound Type:    Description of Altered Skin Integrity:    Ankle-Brachial Index:    Pulses:    Removal Indication and Assessment:    Wound Outcome:    (Retired) Wound Length (cm):    (Retired) Wound Width (cm):    (Retired) Depth (cm):    Wound Description (Comments):    Removal Indications:    Wound Image   10/05/23 1629   Dressing Appearance Dry;Intact;Clean;Moist drainage 10/05/23 1629   Drainage Amount Large 10/05/23 1629   Drainage Characteristics/Odor Serosanguineous 10/05/23 1629   Red (%), Wound Tissue Color 100 % 10/05/23 1629   Periwound Area Intact;Hemosiderin Staining;Edematous 10/05/23 1629   Wound Length (cm) 3 cm 10/05/23 1629   Wound Width (cm) 3 cm 10/05/23 1629   Wound Depth (cm) 0.1 cm 10/05/23 1629   Wound Volume (cm^3) 0.9 cm^3 10/05/23 1629   Wound Surface Area (cm^2) 9 cm^2 10/05/23 1629   Care Cleansed with:;Soap and water 10/05/23 1629   Dressing Applied;Calcium alginate;Rolled gauze 10/05/23 1629   Periwound Care Skin barrier film applied 10/05/23 1629            Altered Skin Integrity Left lower;medial Leg (Active)       Altered Skin Integrity Present on Admission - Did Patient arrive to the hospital with altered skin?:    Side: Left   Orientation: lower;medial   Location: Leg   Wound Number:    Is this injury device related?:    Primary Wound Type:    Description of Altered Skin Integrity:    Ankle-Brachial Index:    Pulses:    Removal Indication and Assessment:    Wound Outcome:    (Retired) Wound Length (cm):    (Retired) Wound  Width (cm):    (Retired) Depth (cm):    Wound Description (Comments):    Removal Indications:    Wound Image   10/05/23 1629   Dressing Appearance Dry;Intact;Clean;Moist drainage 10/05/23 1629   Drainage Amount Large 10/05/23 1629   Drainage Characteristics/Odor Serosanguineous 10/05/23 1629   Red (%), Wound Tissue Color 100 % 10/05/23 1629   Periwound Area Intact;Hemosiderin Staining;Edematous 10/05/23 1629   Wound Length (cm) 1.1 cm 10/05/23 1629   Wound Width (cm) 0.4 cm 10/05/23 1629   Wound Depth (cm) 0.1 cm 10/05/23 1629   Wound Volume (cm^3) 0.044 cm^3 10/05/23 1629   Wound Surface Area (cm^2) 0.44 cm^2 10/05/23 1629   Care Cleansed with:;Soap and water 10/05/23 1629   Dressing Applied;Calcium alginate;Rolled gauze 10/05/23 1629   Periwound Care Skin barrier film applied 10/05/23 1629         Claude was seen in the clinic room and placed in the supine position on the treatment table.  The dressings were removed.The legs were cleansed with Easi-clense sponges and dried thoroughly.  Erythema and warmth noted. No odor appreciated.    Plan of Care: Calmoseptine to periwounds, aquacel to wound beds, kerlix, and secured bandages with flex-net.    Plan:       Bilateral lower extremities dressed as detailed above.   Patient was instructed to not get the dressings wet and to use cast covers for showering.  Should the dressing become wet, he is to remove it, place another aquacel border dressing to the area. He should then notify this office as soon as possible to have a new dressing applied.    Discussed PAD with patient. Discussed toe pressure, venous ultrasound, and arterial ultrasound results with patient and patient's son. Unable to utilize therapeutic compression due to severe bilateral lower extremity arterial disease. Instructed patient to keep appointments with Dr. Kothari and Dr. Lewis.     Instructed patient to elevate legs whenever sedentary and follow a low sodium diet.       Discussed nutrition and the  role of protein in wound healing with the patient. Instructed patient to continue protein regimen for wound healing.     Instructed patient to continue taking bumetanide as prescribed.     Discussed cellulitis with patient. Prescribed doxycyline. Discussed side effects of medication. Instructed patient to take medication as prescribed.     Discussed following weekly vs home health. Pt opted for home health.  Orders placed. [employer] Ne[employer]ity form faxed.   HOME HEALTH WOUND CARE ORDERS  D/C previous orders  Wound care and nurse visits  1  X a week and PRN complications  Wound location- bilateral lower extremities and left foot wound  1. Cleanse wound with saline or wound cleanser    ( pt may shower)  2.Apply- calmoseptine to periwound, aquacel to wound beds, kerlix, and secure bandages. Do not apply compression.  3.Cover with appropriate cover dressing and contact the office for any substituions in dressings  Monitor for infection, teach patient/caregiver signs and symptoms, as well as how to do dressing changes    Written and verbal instructions given to patient.  RTC in 1 week if home health does not admit patient in time, RTC in 1 month if home health admits patient.    Aneta Olmstead PA-C                 45  minutes of total time spent on the encounter, which includes face to face time and non-face to face time preparing to see the patient (eg, review of tests), Obtaining and/or reviewing separately obtained history, Documenting clinical information in the electronic or other health record, Independently interpreting results (not separately reported) and communicating results to the patient/family/caregiver, or Care coordination (not separately reported).

## 2023-10-08 ENCOUNTER — PATIENT MESSAGE (OUTPATIENT)
Dept: WOUND CARE | Facility: CLINIC | Age: 88
End: 2023-10-08
Payer: MEDICARE

## 2023-10-09 PROCEDURE — G0180 MD CERTIFICATION HHA PATIENT: HCPCS | Mod: ,,, | Performed by: INTERNAL MEDICINE

## 2023-11-03 ENCOUNTER — DOCUMENT SCAN (OUTPATIENT)
Dept: HOME HEALTH SERVICES | Facility: HOSPITAL | Age: 88
End: 2023-11-03
Payer: MEDICARE

## 2023-11-09 ENCOUNTER — DOCUMENT SCAN (OUTPATIENT)
Dept: HOME HEALTH SERVICES | Facility: HOSPITAL | Age: 88
End: 2023-11-09
Payer: MEDICARE

## 2023-11-09 ENCOUNTER — OFFICE VISIT (OUTPATIENT)
Dept: WOUND CARE | Facility: CLINIC | Age: 88
End: 2023-11-09
Payer: MEDICARE

## 2023-11-09 VITALS
WEIGHT: 177.69 LBS | BODY MASS INDEX: 27.89 KG/M2 | TEMPERATURE: 98 F | OXYGEN SATURATION: 95 % | SYSTOLIC BLOOD PRESSURE: 171 MMHG | HEART RATE: 67 BPM | DIASTOLIC BLOOD PRESSURE: 72 MMHG | HEIGHT: 67 IN

## 2023-11-09 DIAGNOSIS — I50.31 ACUTE DIASTOLIC HEART FAILURE: ICD-10-CM

## 2023-11-09 DIAGNOSIS — M21.961 ACQUIRED DEFORMITY OF RIGHT FOOT: ICD-10-CM

## 2023-11-09 DIAGNOSIS — I73.9 PAD (PERIPHERAL ARTERY DISEASE): ICD-10-CM

## 2023-11-09 DIAGNOSIS — R60.0 EDEMA OF BOTH LOWER EXTREMITIES: ICD-10-CM

## 2023-11-09 DIAGNOSIS — I87.2 VENOUS STASIS DERMATITIS OF BOTH LOWER EXTREMITIES: ICD-10-CM

## 2023-11-09 DIAGNOSIS — S91.302D OPEN WOUND OF LEFT FOOT, SUBSEQUENT ENCOUNTER: ICD-10-CM

## 2023-11-09 DIAGNOSIS — I83.029 VENOUS STASIS ULCERS OF BOTH LOWER EXTREMITIES: Primary | ICD-10-CM

## 2023-11-09 DIAGNOSIS — L97.919 VENOUS STASIS ULCERS OF BOTH LOWER EXTREMITIES: Primary | ICD-10-CM

## 2023-11-09 DIAGNOSIS — I10 HYPERTENSION, UNSPECIFIED TYPE: ICD-10-CM

## 2023-11-09 DIAGNOSIS — I83.019 VENOUS STASIS ULCERS OF BOTH LOWER EXTREMITIES: Primary | ICD-10-CM

## 2023-11-09 DIAGNOSIS — L97.929 VENOUS STASIS ULCERS OF BOTH LOWER EXTREMITIES: Primary | ICD-10-CM

## 2023-11-09 PROCEDURE — 99999 PR PBB SHADOW E&M-EST. PATIENT-LVL V: CPT | Mod: PBBFAC,,,

## 2023-11-09 PROCEDURE — 1126F PR PAIN SEVERITY QUANTIFIED, NO PAIN PRESENT: ICD-10-PCS | Mod: CPTII,S$GLB,,

## 2023-11-09 PROCEDURE — 3288F PR FALLS RISK ASSESSMENT DOCUMENTED: ICD-10-PCS | Mod: CPTII,S$GLB,,

## 2023-11-09 PROCEDURE — 1159F PR MEDICATION LIST DOCUMENTED IN MEDICAL RECORD: ICD-10-PCS | Mod: CPTII,S$GLB,,

## 2023-11-09 PROCEDURE — 99999 PR PBB SHADOW E&M-EST. PATIENT-LVL V: ICD-10-PCS | Mod: PBBFAC,,,

## 2023-11-09 PROCEDURE — 99214 OFFICE O/P EST MOD 30 MIN: CPT | Mod: S$GLB,,,

## 2023-11-09 PROCEDURE — 1101F PR PT FALLS ASSESS DOC 0-1 FALLS W/OUT INJ PAST YR: ICD-10-PCS | Mod: CPTII,S$GLB,,

## 2023-11-09 PROCEDURE — 1159F MED LIST DOCD IN RCRD: CPT | Mod: CPTII,S$GLB,,

## 2023-11-09 PROCEDURE — 1126F AMNT PAIN NOTED NONE PRSNT: CPT | Mod: CPTII,S$GLB,,

## 2023-11-09 PROCEDURE — 99214 PR OFFICE/OUTPT VISIT, EST, LEVL IV, 30-39 MIN: ICD-10-PCS | Mod: S$GLB,,,

## 2023-11-09 PROCEDURE — 3288F FALL RISK ASSESSMENT DOCD: CPT | Mod: CPTII,S$GLB,,

## 2023-11-09 PROCEDURE — 1101F PT FALLS ASSESS-DOCD LE1/YR: CPT | Mod: CPTII,S$GLB,,

## 2023-11-09 NOTE — PROGRESS NOTES
Subjective:       Patient ID: Claude T Dupuis is a 89 y.o. male     Chief Complaint: Wound Check      Patient presents with his son for a re-evaluation of a bilateral lower extremity wounds and left foot wound. Patient follows with Dr. Lewis for PAD. Patient followed by Dr. Kothari- left greater saphenous ablation was scheduled for 12/22/22. Pt cancelled appointment. He is unsure when the wounds formed anywhere from March - May. Patient was dressing his wounds with urgotul and cleansing wounds with vashe. He was occasionally been leaving his wounds open to air. Pt has home health. Pt reports completing antibiotic. No complaints at this time. Denies fever, chills, erythema, warmth, or purulent drainage.      9/7/22 CV Toe Pressures:   · Right TBI 0.35, suggestive of severe right lower extremity arterial disease.  · Left TBI 0.14, is suggestive of severe left lower extremity arterial disease.  · Digit waveforms are mildly dampened on the left.    9/7/22 Ultrasound Doppler Arterial:  · There is scattered atherosclerotic plaque throughout the bilateral lower extremity arteries.  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  · Right anterior tibial artery stenosis, greater than 75%.  · Right posterior tibial artery is occluded.  · Right peroneal artery not visualized, possibly occluded.  · Left tibio peroneal trunk artery stenosis, 50-75%.  · Left anterior tibial artery stenosis, greater than 75%.  · Left posterior tibial artery is occluded.      Review of Systems   Constitutional:  Negative for activity change, chills, diaphoresis, fatigue and fever.   Respiratory:  Negative for apnea, chest tightness and shortness of breath.    Cardiovascular:  Positive for leg swelling. Negative for chest pain and palpitations.   Musculoskeletal:  Negative for gait problem and joint swelling.   Skin:  Positive for wound. Negative for pallor and rash.   Neurological:  Negative for syncope, weakness and  numbness.   Psychiatric/Behavioral:  Negative for agitation. The patient is not nervous/anxious.    All other systems reviewed and are negative.      Objective:      Physical Exam  Vitals reviewed.   Constitutional:       General: He is not in acute distress.     Appearance: Normal appearance.   HENT:      Right Ear: Decreased hearing noted.      Left Ear: Decreased hearing noted.   Cardiovascular:      Rate and Rhythm: Normal rate and regular rhythm.   Pulmonary:      Effort: No respiratory distress.   Musculoskeletal:         General: No swelling.      Right lower leg: Edema present.      Left lower leg: Edema present.   Skin:     General: Skin is warm and dry.      Findings: Wound present. No erythema.          Neurological:      General: No focal deficit present.      Mental Status: He is alert and oriented to person, place, and time.   Psychiatric:         Mood and Affect: Mood normal.         Behavior: Behavior normal.         Thought Content: Thought content normal.         Judgment: Judgment normal.         Assessment:       1. Venous stasis ulcers of both lower extremities    2. Open wound of left foot, subsequent encounter    3. Edema of both lower extremities    4. PAD (peripheral artery disease)    5. Acute diastolic heart failure    6. Venous stasis dermatitis of both lower extremities    7. Hypertension, unspecified type    8. Acquired deformity of right foot               Altered Skin Integrity Left anterior Foot (Active)       Altered Skin Integrity Present on Admission - Did Patient arrive to the hospital with altered skin?:    Side: Left   Orientation: anterior   Location: Foot   Wound Number:    Is this injury device related?:    Primary Wound Type:    Description of Altered Skin Integrity:    Ankle-Brachial Index:    Pulses:    Removal Indication and Assessment:    Wound Outcome:    (Retired) Wound Length (cm):    (Retired) Wound Width (cm):    (Retired) Depth (cm):    Wound Description  (Comments):    Removal Indications:    Wound Image    11/09/23 1613   Dressing Appearance Dry;Intact;Clean;Moist drainage 11/09/23 1613   Drainage Amount Scant 11/09/23 1613   Drainage Characteristics/Odor Serous 11/09/23 1613   Yellow (%), Wound Tissue Color 100 % 11/09/23 1613   Periwound Area Intact;[place] 11/09/23 1613   Wound Length (cm) 0.3 cm 11/09/23 1613   Wound Width (cm) 0.4 cm 11/09/23 1613   Wound Depth (cm) 0.1 cm 11/09/23 1613   Wound Volume (cm^3) 0.012 cm^3 11/09/23 1613   Wound Surface Area (cm^2) 0.12 cm^2 11/09/23 1613   Care Cleansed with:;Soap and water 11/09/23 1613   Dressing Applied;Hydrogel;[place]/border;Silicone 11/09/23 1613   Periwound Care Skin barrier film applied 11/09/23 1613           Claude was seen in the clinic room and placed in the supine position on the treatment table.  The dressings were removed.The legs were cleansed with Easi-clense sponges and dried thoroughly.  No erythema, warmth, or odor noted. Bilateral leg wounds noted to be healed. Right    Plan of Care: Skin prep to periwounds, hydrogel to wound beds, and covered with a mepilex border dressing.    Plan:       Left foot dressed as detailed above.  Patient was instructed to not get the dressings wet and to use cast covers for showering.  Should the dressing become wet, he is to remove it, place another aquacel border dressing to the area. He should then notify this office as soon as possible to have a new dressing applied.    Discussed PAD with patient. Discussed toe pressure, venous ultrasound, and arterial ultrasound results with patient and patient's son. Unable to utilize therapeutic compression due to severe bilateral lower extremity arterial disease. Instructed patient to keep appointments with vascular medicine.     Instructed patient to elevate legs whenever sedentary and follow a low sodium diet.       Discussed nutrition and the role of protein in wound healing with the patient. Instructed patient to continue  protein regimen for wound healing.     Instructed patient to continue taking bumetanide as prescribed.     Discussed right foot lesion with Dr. Pearson- referral placed to podiatry.    Faxed home health orders   HOME HEALTH WOUND CARE ORDERS  D/C previous orders  Wound care and nurse visits  1  X a week and PRN complications  Wound location-  left foot wound  1. Cleanse wound with saline or wound cleanser    ( pt may shower)  2.Apply-skin prep to periwound, hydrogel to wound bed, and covered with mepilex border dressing   Do not apply compression.  3.Cover with appropriate cover dressing and contact the office for any substituions in dressings  Monitor for infection, teach patient/caregiver signs and symptoms, as well as how to do dressing changes    Written and verbal instructions given to patient.  RTC in 1 month    Aneta Olmstead PA-C

## 2023-11-14 ENCOUNTER — DOCUMENT SCAN (OUTPATIENT)
Dept: HOME HEALTH SERVICES | Facility: HOSPITAL | Age: 88
End: 2023-11-14
Payer: MEDICARE

## 2023-11-14 ENCOUNTER — PATIENT MESSAGE (OUTPATIENT)
Dept: WOUND CARE | Facility: CLINIC | Age: 88
End: 2023-11-14
Payer: MEDICARE

## 2023-11-15 ENCOUNTER — TELEPHONE (OUTPATIENT)
Dept: WOUND CARE | Facility: CLINIC | Age: 88
End: 2023-11-15
Payer: MEDICARE

## 2023-11-15 NOTE — TELEPHONE ENCOUNTER
Called and spoke with patient's son, Anton, who advises patient is much improved with no complaints of pain, discoloration or cold to touch.  Patient sitting in recliner with legs elevated.  Told to call or contact us via [employer] for any concerns.

## 2023-11-15 NOTE — TELEPHONE ENCOUNTER
Returned call and spoke with Petty, Home Health Nurse, regarding message from patient's son regarding compression applied to patient's lower legs.  Advised nurse that patient's son was told if the left foot/leg feels numb, cold, turns blue or if the pain does not get better to go to the ER.  Nurse states patient's legs are swelling and felt a weak pulse and applied ace wraps to control the swelling.  Nurse advised patient has decreased TBI's and the wound care orders stated Not to apply compression.  Nurse states patient has compression socks - nurse advised to tell patient Do not use compression socks or any type of compression.

## 2023-11-15 NOTE — TELEPHONE ENCOUNTER
"----- Message from Laura Guerin sent at 11/15/2023 10:36 AM CST -----  Consult/Advisory:      Name Of Caller: Petty / PANFILO     Contact Preference:  [phone]     What is the nature of the call?: stated that pt right leg is tender to touch along with chronic pain.       Additional Notes:  "Thank you for all that you do for our patients"          "

## 2023-11-16 ENCOUNTER — PATIENT MESSAGE (OUTPATIENT)
Dept: CARDIOLOGY | Facility: CLINIC | Age: 88
End: 2023-11-16
Payer: MEDICARE

## 2023-11-16 ENCOUNTER — PATIENT MESSAGE (OUTPATIENT)
Dept: ADMINISTRATIVE | Facility: HOSPITAL | Age: 88
End: 2023-11-16
Payer: MEDICARE

## 2023-11-17 NOTE — ADDENDUM NOTE
Addended by: GROVER ABARCA on: 11/17/2023 10:07 AM     Modules accepted: Orders, Level of Service

## 2023-11-19 ENCOUNTER — DOCUMENT SCAN (OUTPATIENT)
Dept: HOME HEALTH SERVICES | Facility: HOSPITAL | Age: 88
End: 2023-11-19
Payer: MEDICARE

## 2023-11-20 ENCOUNTER — EXTERNAL HOME HEALTH (OUTPATIENT)
Dept: HOME HEALTH SERVICES | Facility: HOSPITAL | Age: 88
End: 2023-11-20
Payer: MEDICARE

## 2023-11-22 ENCOUNTER — DOCUMENT SCAN (OUTPATIENT)
Dept: HOME HEALTH SERVICES | Facility: HOSPITAL | Age: 88
End: 2023-11-22
Payer: MEDICARE

## 2023-11-27 ENCOUNTER — OFFICE VISIT (OUTPATIENT)
Dept: PODIATRY | Facility: CLINIC | Age: 88
End: 2023-11-27
Payer: MEDICARE

## 2023-11-27 VITALS
HEART RATE: 71 BPM | HEIGHT: 67 IN | BODY MASS INDEX: 27.78 KG/M2 | DIASTOLIC BLOOD PRESSURE: 77 MMHG | SYSTOLIC BLOOD PRESSURE: 159 MMHG | WEIGHT: 177 LBS

## 2023-11-27 DIAGNOSIS — L84 PRE-ULCERATIVE CORN OR CALLOUS: Chronic | ICD-10-CM

## 2023-11-27 DIAGNOSIS — M21.961 ACQUIRED DEFORMITY OF RIGHT FOOT: ICD-10-CM

## 2023-11-27 DIAGNOSIS — M20.31 HALLUX MALLEUS OF RIGHT FOOT: Chronic | ICD-10-CM

## 2023-11-27 DIAGNOSIS — I73.9 PAD (PERIPHERAL ARTERY DISEASE): Chronic | ICD-10-CM

## 2023-11-27 DIAGNOSIS — E11.9 TYPE 2 DIABETES MELLITUS WITHOUT COMPLICATION, UNSPECIFIED WHETHER LONG TERM INSULIN USE: Chronic | ICD-10-CM

## 2023-11-27 DIAGNOSIS — E11.9 ENCOUNTER FOR DIABETIC FOOT EXAM: Primary | ICD-10-CM

## 2023-11-27 PROCEDURE — 99203 OFFICE O/P NEW LOW 30 MIN: CPT | Mod: S$GLB,,, | Performed by: PODIATRIST

## 2023-11-27 PROCEDURE — 1160F RVW MEDS BY RX/DR IN RCRD: CPT | Mod: CPTII,S$GLB,, | Performed by: PODIATRIST

## 2023-11-27 PROCEDURE — 3288F PR FALLS RISK ASSESSMENT DOCUMENTED: ICD-10-PCS | Mod: CPTII,S$GLB,, | Performed by: PODIATRIST

## 2023-11-27 PROCEDURE — 1159F MED LIST DOCD IN RCRD: CPT | Mod: CPTII,S$GLB,, | Performed by: PODIATRIST

## 2023-11-27 PROCEDURE — 99999 PR PBB SHADOW E&M-EST. PATIENT-LVL III: CPT | Mod: PBBFAC,,, | Performed by: PODIATRIST

## 2023-11-27 PROCEDURE — 1160F PR REVIEW ALL MEDS BY PRESCRIBER/CLIN PHARMACIST DOCUMENTED: ICD-10-PCS | Mod: CPTII,S$GLB,, | Performed by: PODIATRIST

## 2023-11-27 PROCEDURE — 1101F PT FALLS ASSESS-DOCD LE1/YR: CPT | Mod: CPTII,S$GLB,, | Performed by: PODIATRIST

## 2023-11-27 PROCEDURE — 99203 PR OFFICE/OUTPT VISIT, NEW, LEVL III, 30-44 MIN: ICD-10-PCS | Mod: S$GLB,,, | Performed by: PODIATRIST

## 2023-11-27 PROCEDURE — 3288F FALL RISK ASSESSMENT DOCD: CPT | Mod: CPTII,S$GLB,, | Performed by: PODIATRIST

## 2023-11-27 PROCEDURE — 1126F PR PAIN SEVERITY QUANTIFIED, NO PAIN PRESENT: ICD-10-PCS | Mod: CPTII,S$GLB,, | Performed by: PODIATRIST

## 2023-11-27 PROCEDURE — 1159F PR MEDICATION LIST DOCUMENTED IN MEDICAL RECORD: ICD-10-PCS | Mod: CPTII,S$GLB,, | Performed by: PODIATRIST

## 2023-11-27 PROCEDURE — 99999 PR PBB SHADOW E&M-EST. PATIENT-LVL III: ICD-10-PCS | Mod: PBBFAC,,, | Performed by: PODIATRIST

## 2023-11-27 PROCEDURE — 1101F PR PT FALLS ASSESS DOC 0-1 FALLS W/OUT INJ PAST YR: ICD-10-PCS | Mod: CPTII,S$GLB,, | Performed by: PODIATRIST

## 2023-11-27 PROCEDURE — 1126F AMNT PAIN NOTED NONE PRSNT: CPT | Mod: CPTII,S$GLB,, | Performed by: PODIATRIST

## 2023-11-27 NOTE — PROGRESS NOTES
Chief Complaint   Patient presents with    Diabetic Foot Exam     Foot Exam/PCP Seb Gray MD  07/07/23              MEDICAL DECISION MAKING:        ICD-10-CM ICD-9-CM    1. Encounter for diabetic foot exam  E11.9 250.00       2. Acquired deformity of right foot  M21.961 736.70 Ambulatory referral/consult to Podiatry      DIABETIC SHOES FOR HOME USE      3. PAD (peripheral artery disease)  I73.9 443.9       4. Hallux malleus of right foot  M20.31 735.3 DIABETIC SHOES FOR HOME USE      5. Type 2 diabetes mellitus without complication, unspecified whether long term insulin use  E11.9 250.00 DIABETIC SHOES FOR HOME USE      6. Pre-ulcerative corn or callous  L84 700     right hallux              I counseled the patient on the patient's conditions, their implications and medical management.   Shoe inspection.     Continue good nutrition and blood sugar control to help prevent podiatric complications of diabetes.   Maintain proper foot hygiene.   Continue wearing proper shoe gear, daily foot inspections, never walking without protective shoe gear, never putting sharp instruments to feet.  Prescription diabetes shoes.   Follow up 2-3 months foot exam or sooner if concerned.       I spent a total of 30 minutes on the day of the visit.  This includes face to face time and non-face to face time preparing to see the patient (eg, review of tests), obtaining and/or reviewing separately obtained history, documenting clinical information in the electronic or other health record, independently interpreting results and communicating results to the patient/family/caregiver, or care coordinator.            HPI:   The patient is a 89 y.o.  male  who presents for a diabetic foot exam.     Patient reports occasional presence of abnormal sensation to the feet .    History of venous stasis ulcerations.  Follows in wound care.  He has Home health.   History of foot surgery: none.     Shoes worn today:  skechers.  He needs diabetes shoes.        The patient is under the Active Care of Seb Gray MD for the qualifying diagnosis of diabetes mellitus.   Last encounter on:  7/7/2023          Patient Active Problem List   Diagnosis    Acute diastolic heart failure    Angina pectoris    Ulcer of left lower extremity with fat layer exposed    Edema of both lower extremities    Venous insufficiency of left lower extremity    Hx of CABG    Left leg pain    Primary hypertension    PAD (peripheral artery disease)    Coronary artery disease    Confusion    Right arm pain    Seizure    Type 2 diabetes mellitus without complications    Other specified hypothyroidism    Chronic deep vein thrombosis (DVT) of proximal vein of lower extremity    History of colon cancer    Venous stasis ulcers of both lower extremities    Supraventricular tachycardia    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      atorvastatin (LIPITOR) 40 MG tablet Take 1 tablet (40 mg total) by mouth once daily. 90 tablet 3    bumetanide (BUMEX) 0.5 MG Tab Take 1 tablet (0.5 mg total) by mouth 2 (two) times a day. 180 tablet 3    isosorbide mononitrate (IMDUR) 30 MG 24 hr tablet TAKE 1 TABLET(30 MG) BY MOUTH EVERY DAY 90 tablet 3    latanoprost 0.005 % ophthalmic solution Place 1 drop into both eyes every evening.      levothyroxine (SYNTHROID) 88 MCG tablet Take 1 tablet (88 mcg total) by mouth before breakfast. 90 tablet 3    metoprolol tartrate (LOPRESSOR) 100 MG tablet Take 100 mg by mouth 2 (two) times daily.      metoprolol tartrate (LOPRESSOR) 50 MG tablet Take 50 mg by mouth 2 (two) times daily.      tamsulosin (FLOMAX) 0.4 mg Cap Take 2 capsules by mouth.       No current facility-administered medications on file prior to visit.           Review of patient's allergies indicates:   Allergen  "Reactions    Fluzone high-dose [id number] [influenza vaccine tr-s 09 (pf)]      Had tomasa ocampo             ROS:  General ROS: negative  Respiratory ROS: no cough, shortness of breath, or wheezing  Cardiovascular ROS: no chest pain or dyspnea on exertion  Musculoskeletal ROS: negative  Dermatological ROS: negative      LAST HbA1c:   Lab Results   Component Value Date    HGBA1C 6.8 (H) 07/07/2023           EXAM:   Vitals:    11/27/23 1533   BP: (!) 159/77   Pulse: 71   Weight: 80.3 kg (177 lb)   Height: 5' 7" (1.702 m)       General: alert, no distress, cooperative, uses walker for assistance.     Vascular:   Dorsalis Pedis:  diminished     Posterior Tibial:  diminished  Capillary refill time:  3 seconds  Temperature of toes cool to touch  Edema:  1+ and pitting      Neurological:     Sharp touch:  decreased  Light touch: decreased  Tinels Sign:  Absent  Mulders Click:   Absent  [place]:  Absent deficits to sharp/dull, light touch or vibratory sensation feet, ten points tested.    Present paresthesias (Abnormal spontaneous sensations in feet)        Dermatological:   Skin: thin, atrophic, and appropriate for age  Hair growth:  absent  Wounds/Ulcers:  Absent  Bruising:  Absent  Erythema:  Absent  Toenails:   thickness:  thickened;   Yellowish in color,  without subungual debris.   Absent paronychia  Callus right plantar hallux without underlying wound.        Musculoskeletal:   Metatarsophalangeal range of motion:   diminished range of motion  Subtalar joint range of motion: diminished range of motion  Ankle joint range of motion:  diminished range of motion  Bunions:  Absent  Hammertoes: Present  "

## 2023-11-29 ENCOUNTER — DOCUMENT SCAN (OUTPATIENT)
Dept: HOME HEALTH SERVICES | Facility: HOSPITAL | Age: 88
End: 2023-11-29
Payer: MEDICARE

## 2023-12-06 ENCOUNTER — PATIENT MESSAGE (OUTPATIENT)
Dept: INTERNAL MEDICINE | Facility: CLINIC | Age: 88
End: 2023-12-06
Payer: MEDICARE

## 2023-12-06 DIAGNOSIS — I87.2 VENOUS INSUFFICIENCY OF LEFT LOWER EXTREMITY: ICD-10-CM

## 2023-12-06 DIAGNOSIS — L97.919 VENOUS STASIS ULCERS OF BOTH LOWER EXTREMITIES: ICD-10-CM

## 2023-12-06 DIAGNOSIS — I83.029 VENOUS STASIS ULCERS OF BOTH LOWER EXTREMITIES: ICD-10-CM

## 2023-12-06 DIAGNOSIS — R60.0 EDEMA OF BOTH LOWER EXTREMITIES: ICD-10-CM

## 2023-12-06 DIAGNOSIS — I73.9 PAD (PERIPHERAL ARTERY DISEASE): Primary | Chronic | ICD-10-CM

## 2023-12-06 DIAGNOSIS — M79.605 LEFT LEG PAIN: ICD-10-CM

## 2023-12-06 DIAGNOSIS — L97.929 VENOUS STASIS ULCERS OF BOTH LOWER EXTREMITIES: ICD-10-CM

## 2023-12-06 DIAGNOSIS — I83.019 VENOUS STASIS ULCERS OF BOTH LOWER EXTREMITIES: ICD-10-CM

## 2023-12-07 ENCOUNTER — OFFICE VISIT (OUTPATIENT)
Dept: WOUND CARE | Facility: CLINIC | Age: 88
End: 2023-12-07
Payer: MEDICARE

## 2023-12-07 VITALS
SYSTOLIC BLOOD PRESSURE: 189 MMHG | WEIGHT: 175.94 LBS | BODY MASS INDEX: 27.61 KG/M2 | DIASTOLIC BLOOD PRESSURE: 77 MMHG | OXYGEN SATURATION: 97 % | HEART RATE: 58 BPM | HEIGHT: 67 IN

## 2023-12-07 DIAGNOSIS — I87.2 VENOUS STASIS DERMATITIS OF BOTH LOWER EXTREMITIES: ICD-10-CM

## 2023-12-07 DIAGNOSIS — R60.0 EDEMA OF BOTH LOWER EXTREMITIES: ICD-10-CM

## 2023-12-07 DIAGNOSIS — I73.9 PAD (PERIPHERAL ARTERY DISEASE): ICD-10-CM

## 2023-12-07 DIAGNOSIS — L97.929 VENOUS STASIS ULCERS OF BOTH LOWER EXTREMITIES: Primary | ICD-10-CM

## 2023-12-07 DIAGNOSIS — I83.029 VENOUS STASIS ULCERS OF BOTH LOWER EXTREMITIES: Primary | ICD-10-CM

## 2023-12-07 DIAGNOSIS — R23.8 SKIN BULLA: ICD-10-CM

## 2023-12-07 DIAGNOSIS — I10 HYPERTENSION, UNSPECIFIED TYPE: ICD-10-CM

## 2023-12-07 DIAGNOSIS — L97.919 VENOUS STASIS ULCERS OF BOTH LOWER EXTREMITIES: Primary | ICD-10-CM

## 2023-12-07 DIAGNOSIS — I83.019 VENOUS STASIS ULCERS OF BOTH LOWER EXTREMITIES: Primary | ICD-10-CM

## 2023-12-07 DIAGNOSIS — I50.31 ACUTE DIASTOLIC HEART FAILURE: ICD-10-CM

## 2023-12-07 PROCEDURE — 1125F AMNT PAIN NOTED PAIN PRSNT: CPT | Mod: CPTII,S$GLB,,

## 2023-12-07 PROCEDURE — 99999 PR PBB SHADOW E&M-EST. PATIENT-LVL III: CPT | Mod: PBBFAC,,,

## 2023-12-07 PROCEDURE — 1159F PR MEDICATION LIST DOCUMENTED IN MEDICAL RECORD: ICD-10-PCS | Mod: CPTII,S$GLB,,

## 2023-12-07 PROCEDURE — 1101F PT FALLS ASSESS-DOCD LE1/YR: CPT | Mod: CPTII,S$GLB,,

## 2023-12-07 PROCEDURE — 99999 PR PBB SHADOW E&M-EST. PATIENT-LVL III: ICD-10-PCS | Mod: PBBFAC,,,

## 2023-12-07 PROCEDURE — 99213 OFFICE O/P EST LOW 20 MIN: CPT | Mod: S$GLB,,,

## 2023-12-07 PROCEDURE — 1159F MED LIST DOCD IN RCRD: CPT | Mod: CPTII,S$GLB,,

## 2023-12-07 PROCEDURE — 3288F PR FALLS RISK ASSESSMENT DOCUMENTED: ICD-10-PCS | Mod: CPTII,S$GLB,,

## 2023-12-07 PROCEDURE — 1125F PR PAIN SEVERITY QUANTIFIED, PAIN PRESENT: ICD-10-PCS | Mod: CPTII,S$GLB,,

## 2023-12-07 PROCEDURE — 1101F PR PT FALLS ASSESS DOC 0-1 FALLS W/OUT INJ PAST YR: ICD-10-PCS | Mod: CPTII,S$GLB,,

## 2023-12-07 PROCEDURE — 99213 PR OFFICE/OUTPT VISIT, EST, LEVL III, 20-29 MIN: ICD-10-PCS | Mod: S$GLB,,,

## 2023-12-07 PROCEDURE — 3288F FALL RISK ASSESSMENT DOCD: CPT | Mod: CPTII,S$GLB,,

## 2023-12-07 NOTE — PROGRESS NOTES
Subjective:       Patient ID: Claude T Dupuis is a 89 y.o. male     Chief Complaint: Wound Check      Patient presents with his son for a re-evaluation of a bilateral lower extremity wounds and left foot wound. Patient follows with Dr. Lewis for PAD. Patient followed by Dr. Kothari- left greater saphenous ablation was scheduled for 12/22/22. Pt cancelled appointment. He is unsure when the wounds formed anywhere from March - May. Patient was dressing his wounds with urgotul and cleansing wounds with vashe. He was occasionally been leaving his wounds open to air. Pt has home health.  No complaints at this time. Denies fever, chills, erythema, warmth, or purulent drainage.      9/7/22 CV Toe Pressures:   · Right TBI 0.35, suggestive of severe right lower extremity arterial disease.  · Left TBI 0.14, is suggestive of severe left lower extremity arterial disease.  · Digit waveforms are mildly dampened on the left.    9/7/22 Ultrasound Doppler Arterial:  · There is scattered atherosclerotic plaque throughout the bilateral lower extremity arteries.  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  · Right anterior tibial artery stenosis, greater than 75%.  · Right posterior tibial artery is occluded.  · Right peroneal artery not visualized, possibly occluded.  · Left tibio peroneal trunk artery stenosis, 50-75%.  · Left anterior tibial artery stenosis, greater than 75%.  · Left posterior tibial artery is occluded.      Review of Systems   Constitutional:  Negative for activity change, chills, diaphoresis, fatigue and fever.   Respiratory:  Negative for apnea, chest tightness and shortness of breath.    Cardiovascular:  Positive for leg swelling. Negative for chest pain and palpitations.   Musculoskeletal:  Negative for gait problem and joint swelling.   Skin:  Positive for wound. Negative for pallor and rash.   Neurological:  Negative for syncope, weakness and numbness.   Psychiatric/Behavioral:   Negative for agitation. The patient is not nervous/anxious.    All other systems reviewed and are negative.      Objective:      Physical Exam  Vitals reviewed.   Constitutional:       General: He is not in acute distress.     Appearance: Normal appearance.   HENT:      Right Ear: Decreased hearing noted.      Left Ear: Decreased hearing noted.   Cardiovascular:      Rate and Rhythm: Normal rate and regular rhythm.   Pulmonary:      Effort: No respiratory distress.   Musculoskeletal:         General: No swelling.      Right lower leg: Edema present.      Left lower leg: Edema present.   Skin:     General: Skin is warm and dry.      Findings: Wound present. No erythema.          Neurological:      General: No focal deficit present.      Mental Status: He is alert and oriented to person, place, and time.   Psychiatric:         Mood and Affect: Mood normal.         Behavior: Behavior normal.         Thought Content: Thought content normal.         Judgment: Judgment normal.         Assessment:       1. Venous stasis ulcers of both lower extremities    2. Edema of both lower extremities    3. PAD (peripheral artery disease)    4. Acute diastolic heart failure    5. Venous stasis dermatitis of both lower extremities    6. Hypertension, unspecified type    7. Skin bulla               Altered Skin Integrity Right lower;medial Leg (Active)       Altered Skin Integrity Present on Admission - Did Patient arrive to the hospital with altered skin?:    Side: Right   Orientation: lower;medial   Location: Leg   Wound Number:    Is this injury device related?:    Primary Wound Type:    Description of Altered Skin Integrity:    Ankle-Brachial Index:    Pulses:    Removal Indication and Assessment:    Wound Outcome:    (Retired) Wound Length (cm):    (Retired) Wound Width (cm):    (Retired) Depth (cm):    Wound Description (Comments):    Removal Indications:    Dressing Appearance Dry;Intact;Clean;Moist drainage 12/07/23 8701    Drainage Amount Large 12/07/23 1636   Drainage Characteristics/Odor Serous 12/07/23 1636   Red (%), Wound Tissue Color 100 % 12/07/23 1636   Periwound Area Intact;Pink;Hemosiderin Staining;Edematous 12/07/23 1636   Wound Length (cm) 3 cm 12/07/23 1636   Wound Width (cm) 0.2 cm 12/07/23 1636   Wound Depth (cm) 0.1 cm 12/07/23 1636   Wound Volume (cm^3) 0.06 cm^3 12/07/23 1636   Wound Surface Area (cm^2) 0.6 cm^2 12/07/23 1636   Care Cleansed with:;Soap and water 12/07/23 1636   Dressing Applied;Calcium alginate;Island/border 12/07/23 1636   Periwound Care Skin barrier film applied 12/07/23 1636            Altered Skin Integrity Left anterior;lower Leg (Active)       Altered Skin Integrity Present on Admission - Did Patient arrive to the hospital with altered skin?:    Side: Left   Orientation: anterior;lower   Location: Leg   Wound Number:    Is this injury device related?:    Primary Wound Type:    Description of Altered Skin Integrity:    Ankle-Brachial Index:    Pulses:    Removal Indication and Assessment:    Wound Outcome:    (Retired) Wound Length (cm):    (Retired) Wound Width (cm):    (Retired) Depth (cm):    Wound Description (Comments):    Removal Indications:    Dressing Appearance Dry;Intact;Clean;Moist drainage 12/07/23 1636   Drainage Amount Large 12/07/23 1636   Drainage Characteristics/Odor Serous 12/07/23 1636   Red (%), Wound Tissue Color 100 % 12/07/23 1636   Periwound Area Intact;Hemosiderin Staining;Edematous;Pink 12/07/23 1636   Wound Length (cm) 0.8 cm 12/07/23 1636   Wound Width (cm) 1 cm 12/07/23 1636   Wound Depth (cm) 0.1 cm 12/07/23 1636   Wound Volume (cm^3) 0.08 cm^3 12/07/23 1636   Wound Surface Area (cm^2) 0.8 cm^2 12/07/23 1636   Care Cleansed with:;Soap and water 12/07/23 1636   Dressing Applied;Calcium alginate;Island/border 12/07/23 1636   Periwound Care Skin barrier film applied 12/07/23 1636           Claude was seen in the clinic room and placed in the supine position on the  treatment table.  The dressings were removed.The legs were cleansed with Easi-clense sponges and dried thoroughly.  No erythema, warmth, or odor noted. New open wounds and new bulla noted.    Plan of Care: Skin prep to periwounds and covered with an aquacel border dressing.    Plan:       Bilateral lower extremities were dressed as detailed above.  Patient was instructed to not get the dressings wet and to use cast covers for showering.  Should the dressing become wet, he is to remove it, place another aquacel border dressing to the area. He should then notify this office as soon as possible to have a new dressing applied.    Discussed PAD with patient. Discussed toe pressure, venous ultrasound, and arterial ultrasound results with patient and patient's son. Unable to utilize therapeutic compression due to severe bilateral lower extremity arterial disease. Instructed patient to keep appointments with vascular medicine.     Instructed patient to elevate legs whenever sedentary and follow a low sodium diet.       Discussed nutrition and the role of protein in wound healing with the patient. Instructed patient to continue protein regimen for wound healing.     Instructed patient to continue taking bumetanide as prescribed.       Faxed home health orders   HOME HEALTH WOUND CARE ORDERS  D/C previous orders  Wound care and nurse visits  1  X a week and PRN complications  Wound location-  bilateral lower extremities  1. Cleanse wound with saline or wound cleanser    ( pt may shower)  2.Apply-skin prep to periwound and covered with aquacel border dressings to wound beds and skin bulla.    Do not apply compression.  3.Cover with appropriate cover dressing and contact the office for any substituions in dressings  Monitor for infection, teach patient/caregiver signs and symptoms, as well as how to do dressing changes    Written and verbal instructions given to patient.  RTC in 1 month or sooner if needed    Aneta Olmstead  LAURA

## 2023-12-08 ENCOUNTER — DOCUMENT SCAN (OUTPATIENT)
Dept: HOME HEALTH SERVICES | Facility: HOSPITAL | Age: 88
End: 2023-12-08
Payer: MEDICARE

## 2023-12-08 PROCEDURE — G0179 MD RECERTIFICATION HHA PT: HCPCS | Mod: ,,, | Performed by: INTERNAL MEDICINE

## 2023-12-12 ENCOUNTER — DOCUMENT SCAN (OUTPATIENT)
Dept: HOME HEALTH SERVICES | Facility: HOSPITAL | Age: 88
End: 2023-12-12
Payer: MEDICARE

## 2023-12-13 ENCOUNTER — DOCUMENT SCAN (OUTPATIENT)
Dept: HOME HEALTH SERVICES | Facility: HOSPITAL | Age: 88
End: 2023-12-13
Payer: MEDICARE

## 2023-12-20 ENCOUNTER — DOCUMENT SCAN (OUTPATIENT)
Dept: HOME HEALTH SERVICES | Facility: HOSPITAL | Age: 88
End: 2023-12-20
Payer: MEDICARE

## 2023-12-27 ENCOUNTER — PATIENT MESSAGE (OUTPATIENT)
Dept: INTERNAL MEDICINE | Facility: CLINIC | Age: 88
End: 2023-12-27
Payer: MEDICARE

## 2023-12-28 ENCOUNTER — DOCUMENT SCAN (OUTPATIENT)
Dept: HOME HEALTH SERVICES | Facility: HOSPITAL | Age: 88
End: 2023-12-28
Payer: MEDICARE

## 2024-01-02 ENCOUNTER — DOCUMENT SCAN (OUTPATIENT)
Dept: HOME HEALTH SERVICES | Facility: HOSPITAL | Age: 89
End: 2024-01-02
Payer: MEDICARE

## 2024-01-03 ENCOUNTER — EXTERNAL HOME HEALTH (OUTPATIENT)
Dept: HOME HEALTH SERVICES | Facility: HOSPITAL | Age: 89
End: 2024-01-03
Payer: MEDICARE

## 2024-01-03 ENCOUNTER — DOCUMENT SCAN (OUTPATIENT)
Dept: HOME HEALTH SERVICES | Facility: HOSPITAL | Age: 89
End: 2024-01-03
Payer: MEDICARE

## 2024-01-05 ENCOUNTER — DOCUMENT SCAN (OUTPATIENT)
Dept: HOME HEALTH SERVICES | Facility: HOSPITAL | Age: 89
End: 2024-01-05
Payer: MEDICARE

## 2024-01-06 ENCOUNTER — DOCUMENT SCAN (OUTPATIENT)
Dept: HOME HEALTH SERVICES | Facility: HOSPITAL | Age: 89
End: 2024-01-06
Payer: MEDICARE

## 2024-01-09 ENCOUNTER — OFFICE VISIT (OUTPATIENT)
Dept: WOUND CARE | Facility: CLINIC | Age: 89
End: 2024-01-09
Payer: MEDICARE

## 2024-01-09 VITALS
SYSTOLIC BLOOD PRESSURE: 128 MMHG | BODY MASS INDEX: 27.51 KG/M2 | HEART RATE: 75 BPM | DIASTOLIC BLOOD PRESSURE: 69 MMHG | WEIGHT: 175.25 LBS | HEIGHT: 67 IN

## 2024-01-09 DIAGNOSIS — L97.929 VENOUS STASIS ULCERS OF BOTH LOWER EXTREMITIES: Primary | ICD-10-CM

## 2024-01-09 DIAGNOSIS — I87.2 VENOUS STASIS DERMATITIS OF BOTH LOWER EXTREMITIES: ICD-10-CM

## 2024-01-09 DIAGNOSIS — I10 HYPERTENSION, UNSPECIFIED TYPE: ICD-10-CM

## 2024-01-09 DIAGNOSIS — R60.0 EDEMA OF BOTH LOWER EXTREMITIES: ICD-10-CM

## 2024-01-09 DIAGNOSIS — I83.019 VENOUS STASIS ULCERS OF BOTH LOWER EXTREMITIES: Primary | ICD-10-CM

## 2024-01-09 DIAGNOSIS — S31.829A BUTTOCK WOUND, LEFT, INITIAL ENCOUNTER: ICD-10-CM

## 2024-01-09 DIAGNOSIS — L97.919 VENOUS STASIS ULCERS OF BOTH LOWER EXTREMITIES: Primary | ICD-10-CM

## 2024-01-09 DIAGNOSIS — I73.9 PAD (PERIPHERAL ARTERY DISEASE): ICD-10-CM

## 2024-01-09 DIAGNOSIS — I83.029 VENOUS STASIS ULCERS OF BOTH LOWER EXTREMITIES: Primary | ICD-10-CM

## 2024-01-09 DIAGNOSIS — I50.31 ACUTE DIASTOLIC HEART FAILURE: ICD-10-CM

## 2024-01-09 PROCEDURE — 99214 OFFICE O/P EST MOD 30 MIN: CPT | Mod: S$GLB,,,

## 2024-01-09 PROCEDURE — 99999 PR PBB SHADOW E&M-EST. PATIENT-LVL III: CPT | Mod: PBBFAC,,,

## 2024-01-09 NOTE — PROGRESS NOTES
Subjective:       Patient ID: Claude T Dupuis is a 89 y.o. male     Chief Complaint: Wound Check      Patient presents with his son for a re-evaluation of a bilateral lower extremity wounds and left buttock wound. Patient follows with Dr. Lewis for PAD. Patient followed by Dr. Kothari- left greater saphenous ablation was scheduled for 12/22/22. Pt cancelled appointment. He is unsure when the wounds formed anywhere from March - May. Patient was dressing his wounds with urgotul and cleansing wounds with vashe. He was occasionally been leaving his wounds open to air. Pt has home health- they have been dressing his wounds with buttock ointment.  No complaints at this time. Denies fever, chills, erythema, warmth, or purulent drainage.      9/7/22 CV Toe Pressures:   · Right TBI 0.35, suggestive of severe right lower extremity arterial disease.  · Left TBI 0.14, is suggestive of severe left lower extremity arterial disease.  · Digit waveforms are mildly dampened on the left.    9/7/22 Ultrasound Doppler Arterial:  · There is scattered atherosclerotic plaque throughout the bilateral lower extremity arteries.  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  · Right anterior tibial artery stenosis, greater than 75%.  · Right posterior tibial artery is occluded.  · Right peroneal artery not visualized, possibly occluded.  · Left tibio peroneal trunk artery stenosis, 50-75%.  · Left anterior tibial artery stenosis, greater than 75%.  · Left posterior tibial artery is occluded.      Review of Systems   Constitutional:  Negative for activity change, chills, diaphoresis, fatigue and fever.   Respiratory:  Negative for apnea, chest tightness and shortness of breath.    Cardiovascular:  Positive for leg swelling. Negative for chest pain and palpitations.   Musculoskeletal:  Negative for gait problem and joint swelling.   Skin:  Positive for wound. Negative for pallor and rash.   Neurological:  Negative for  syncope, weakness and numbness.   Psychiatric/Behavioral:  Negative for agitation. The patient is not nervous/anxious.    All other systems reviewed and are negative.      Objective:      Physical Exam  Vitals reviewed.   Constitutional:       General: He is not in acute distress.     Appearance: Normal appearance.   HENT:      Right Ear: Decreased hearing noted.      Left Ear: Decreased hearing noted.   Cardiovascular:      Rate and Rhythm: Normal rate and regular rhythm.   Pulmonary:      Effort: No respiratory distress.   Musculoskeletal:         General: No swelling.      Right lower leg: Edema present.      Left lower leg: Edema present.   Skin:     General: Skin is warm and dry.      Findings: Wound present. No erythema.          Neurological:      General: No focal deficit present.      Mental Status: He is alert and oriented to person, place, and time.   Psychiatric:         Mood and Affect: Mood normal.         Behavior: Behavior normal.         Thought Content: Thought content normal.         Judgment: Judgment normal.         Assessment:       1. Venous stasis ulcers of both lower extremities    2. Buttock wound, left, initial encounter    3. Edema of both lower extremities    4. PAD (peripheral artery disease)    5. Acute diastolic heart failure    6. Venous stasis dermatitis of both lower extremities    7. Hypertension, unspecified type               Altered Skin Integrity Right anterior;lower Leg (Active)       Altered Skin Integrity Present on Admission - Did Patient arrive to the hospital with altered skin?:    Side: Right   Orientation: anterior;lower   Location: Leg   Wound Number:    Is this injury device related?:    Primary Wound Type:    Description of Altered Skin Integrity:    Ankle-Brachial Index:    Pulses:    Removal Indication and Assessment:    Wound Outcome:    (Retired) Wound Length (cm):    (Retired) Wound Width (cm):    (Retired) Depth (cm):    Wound Description (Comments):     Removal Indications:    Wound Image   01/09/24 1652   Dressing Appearance Dry;Intact;Clean;Moist drainage 01/09/24 1652   Drainage Amount Large 01/09/24 1652   Drainage Characteristics/Odor Serous 01/09/24 1652   Red (%), Wound Tissue Color 30 % 01/09/24 1652   Yellow (%), Wound Tissue Color 70 % 01/09/24 1652   Periwound Area Intact;Hemosiderin Staining;Edematous 01/09/24 1652   Wound Length (cm) 3 cm 01/09/24 1652   Wound Width (cm) 2.7 cm 01/09/24 1652   Wound Depth (cm) 0.1 cm 01/09/24 1652   Wound Volume (cm^3) 0.81 cm^3 01/09/24 1652   Wound Surface Area (cm^2) 8.1 cm^2 01/09/24 1652   Care Cleansed with:;Soap and water 01/09/24 1652   Dressing Applied;Calcium alginate;Island/border 01/09/24 1652   Periwound Care Skin barrier film applied 01/09/24 1652            Altered Skin Integrity Left anterior;lower Leg (Active)       Altered Skin Integrity Present on Admission - Did Patient arrive to the hospital with altered skin?:    Side: Left   Orientation: anterior;lower   Location: Leg   Wound Number:    Is this injury device related?:    Primary Wound Type:    Description of Altered Skin Integrity:    Ankle-Brachial Index:    Pulses:    Removal Indication and Assessment:    Wound Outcome:    (Retired) Wound Length (cm):    (Retired) Wound Width (cm):    (Retired) Depth (cm):    Wound Description (Comments):    Removal Indications:    Wound Image   01/09/24 1652   Dressing Appearance Dry;Intact;Clean;Moist drainage 01/09/24 1652   Drainage Amount Large 01/09/24 1652   Drainage Characteristics/Odor Serous 01/09/24 1652   Red (%), Wound Tissue Color 100 % 01/09/24 1652   Periwound Area Intact;Edematous;Hemosiderin Staining 01/09/24 1652   Wound Length (cm) 4.2 cm 01/09/24 1652   Wound Width (cm) 2.5 cm 01/09/24 1652   Wound Depth (cm) 0.1 cm 01/09/24 1652   Wound Volume (cm^3) 1.05 cm^3 01/09/24 1652   Wound Surface Area (cm^2) 10.5 cm^2 01/09/24 1652   Care Cleansed with:;Soap and water 01/09/24 1652   Dressing  Applied;Calcium alginate;Island/border 01/09/24 1652   Periwound Care Skin barrier film applied 01/09/24 1652            Altered Skin Integrity Left lower;medial Leg (Active)       Altered Skin Integrity Present on Admission - Did Patient arrive to the hospital with altered skin?:    Side: Left   Orientation: lower;medial   Location: Leg   Wound Number:    Is this injury device related?:    Primary Wound Type:    Description of Altered Skin Integrity:    Ankle-Brachial Index:    Pulses:    Removal Indication and Assessment:    Wound Outcome:    (Retired) Wound Length (cm):    (Retired) Wound Width (cm):    (Retired) Depth (cm):    Wound Description (Comments):    Removal Indications:    Wound Image   01/09/24 1652   Dressing Appearance Dry;Intact;Clean;Moist drainage 01/09/24 1652   Drainage Amount Large 01/09/24 1652   Drainage Characteristics/Odor Serous 01/09/24 1652   Red (%), Wound Tissue Color 100 % 01/09/24 1652   Periwound Area Intact;Hemosiderin Staining;Edematous 01/09/24 1652   Wound Length (cm) 5.4 cm 01/09/24 1652   Wound Width (cm) 0.8 cm 01/09/24 1652   Wound Depth (cm) 0.1 cm 01/09/24 1652   Wound Volume (cm^3) 0.432 cm^3 01/09/24 1652   Wound Surface Area (cm^2) 4.32 cm^2 01/09/24 1652   Care Cleansed with:;Soap and water 01/09/24 1652   Dressing Applied;Calcium alginate;Island/border 01/09/24 1652   Periwound Care Skin barrier film applied 01/09/24 1652            Altered Skin Integrity Left Buttocks (Active)       Altered Skin Integrity Present on Admission - Did Patient arrive to the hospital with altered skin?:    Side: Left   Orientation:    Location: Buttocks   Wound Number:    Is this injury device related?:    Primary Wound Type:    Description of Altered Skin Integrity:    Ankle-Brachial Index:    Pulses:    Removal Indication and Assessment:    Wound Outcome:    (Retired) Wound Length (cm):    (Retired) Wound Width (cm):    (Retired) Depth (cm):    Wound Description (Comments):    Removal  Indications:    Dressing Appearance Open to air 01/09/24 1652   Drainage Amount Moderate 01/09/24 1652   Drainage Characteristics/Odor Bleeding controlled 01/09/24 1652   Red (%), Wound Tissue Color 100 % 01/09/24 1652   Periwound Area Intact;Pink 01/09/24 1652   Wound Length (cm) 0.3 cm 01/09/24 1652   Wound Width (cm) 0.3 cm 01/09/24 1652   Wound Depth (cm) 0.1 cm 01/09/24 1652   Wound Volume (cm^3) 0.009 cm^3 01/09/24 1652   Wound Surface Area (cm^2) 0.09 cm^2 01/09/24 1652   Care Cleansed with:;Soap and water 01/09/24 1652   Dressing Applied;Calcium alginate;Island/border 01/09/24 1652   Periwound Care Skin barrier film applied 01/09/24 1652           Claude was seen in the clinic room and placed in the supine position on the treatment table.  The dressings were removed.The legs were cleansed with Easi-clense sponges and dried thoroughly.  No erythema, warmth, or odor noted.     Plan of Care: Skin prep to periwounds and covered with an aquacel border dressing.    Plan:       Bilateral lower extremities and left buttocks was dressed as detailed above.  Patient was instructed to not get the dressings wet and to use cast covers for showering.  Should the dressing become wet, he is to remove it, place another aquacel border dressing to the area. He should then notify this office as soon as possible to have a new dressing applied.    Discussed PAD with patient. Discussed toe pressure, venous ultrasound, and arterial ultrasound results with patient and patient's son. Unable to utilize therapeutic compression due to severe bilateral lower extremity arterial disease. Instructed patient to keep appointments with vascular medicine.     Instructed patient to elevate legs whenever sedentary and follow a low sodium diet.       Discussed nutrition and the role of protein in wound healing with the patient. Instructed patient to continue protein regimen for wound healing.     Instructed patient to continue taking bumetanide as  prescribed.     Faxed home health orders   HOME HEALTH WOUND CARE ORDERS  D/C previous orders  Wound care and nurse visits  3  X a week and PRN complications  Wound location-  bilateral lower extremities and left buttocks  1. Cleanse wound with saline or wound cleanser    ( pt may shower)  2.Apply-skin prep to periwound and covered with aquacel border dressings to wound beds.   Do not apply compression.  3.Cover with appropriate cover dressing and contact the office for any substituions in dressings  Monitor for infection, teach patient/caregiver signs and symptoms, as well as how to do dressing changes    Written and verbal instructions given to patient.  RTC in 1 month or sooner if needed    Aneta Olmstead PA-C

## 2024-01-17 NOTE — ADDENDUM NOTE
Addended by: GROVER ABARCA on: 1/17/2024 01:54 PM     Modules accepted: Orders, Level of Service

## 2024-01-17 NOTE — ADDENDUM NOTE
Addended by: GROVER ABARCA on: 1/17/2024 01:52 PM     Modules accepted: Orders, Level of Service

## 2024-01-17 NOTE — ADDENDUM NOTE
Addended by: GROVER ABARCA on: 1/17/2024 01:53 PM     Modules accepted: Orders, Level of Service

## 2024-01-24 NOTE — ADDENDUM NOTE
Addended by: GROVER ABARCA on: 1/24/2024 01:28 PM     Modules accepted: Orders, Level of Service

## 2024-01-28 ENCOUNTER — DOCUMENT SCAN (OUTPATIENT)
Dept: HOME HEALTH SERVICES | Facility: HOSPITAL | Age: 89
End: 2024-01-28
Payer: MEDICARE

## 2024-02-05 ENCOUNTER — EXTERNAL HOME HEALTH (OUTPATIENT)
Dept: HOME HEALTH SERVICES | Facility: HOSPITAL | Age: 89
End: 2024-02-05
Payer: MEDICARE

## 2024-02-06 PROCEDURE — G0179 MD RECERTIFICATION HHA PT: HCPCS | Mod: ,,, | Performed by: INTERNAL MEDICINE

## 2024-02-07 ENCOUNTER — OFFICE VISIT (OUTPATIENT)
Dept: WOUND CARE | Facility: CLINIC | Age: 89
End: 2024-02-07
Payer: MEDICARE

## 2024-02-07 VITALS
BODY MASS INDEX: 27.34 KG/M2 | HEART RATE: 64 BPM | SYSTOLIC BLOOD PRESSURE: 131 MMHG | TEMPERATURE: 98 F | DIASTOLIC BLOOD PRESSURE: 59 MMHG | HEIGHT: 67 IN | WEIGHT: 174.19 LBS

## 2024-02-07 DIAGNOSIS — I83.029 VENOUS STASIS ULCERS OF BOTH LOWER EXTREMITIES: Primary | ICD-10-CM

## 2024-02-07 DIAGNOSIS — R23.8 SKIN BULLA: ICD-10-CM

## 2024-02-07 DIAGNOSIS — L97.929 VENOUS STASIS ULCERS OF BOTH LOWER EXTREMITIES: Primary | ICD-10-CM

## 2024-02-07 DIAGNOSIS — I87.2 VENOUS STASIS DERMATITIS OF BOTH LOWER EXTREMITIES: ICD-10-CM

## 2024-02-07 DIAGNOSIS — I73.9 PAD (PERIPHERAL ARTERY DISEASE): ICD-10-CM

## 2024-02-07 DIAGNOSIS — L97.919 VENOUS STASIS ULCERS OF BOTH LOWER EXTREMITIES: Primary | ICD-10-CM

## 2024-02-07 DIAGNOSIS — I50.31 ACUTE DIASTOLIC HEART FAILURE: ICD-10-CM

## 2024-02-07 DIAGNOSIS — R60.0 EDEMA OF BOTH LOWER EXTREMITIES: ICD-10-CM

## 2024-02-07 DIAGNOSIS — I83.019 VENOUS STASIS ULCERS OF BOTH LOWER EXTREMITIES: Primary | ICD-10-CM

## 2024-02-07 DIAGNOSIS — I10 HYPERTENSION, UNSPECIFIED TYPE: ICD-10-CM

## 2024-02-07 PROCEDURE — 99999 PR PBB SHADOW E&M-EST. PATIENT-LVL III: CPT | Mod: PBBFAC,,,

## 2024-02-07 PROCEDURE — 99214 OFFICE O/P EST MOD 30 MIN: CPT | Mod: S$GLB,,,

## 2024-02-07 NOTE — PROGRESS NOTES
Subjective:       Patient ID: Claude T Dupuis is a 89 y.o. male     Chief Complaint: Wound Check      Patient presents with his son for a re-evaluation of a bilateral lower extremity wounds and left buttock wound. Patient follows with Dr. Lewis for PAD. Patient followed by Dr. Kothari- left greater saphenous ablation was scheduled for 12/22/22. Pt cancelled appointment. He is unsure when the wounds formed anywhere from March - May. Patient was dressing his wounds with urgotul and cleansing wounds with vashe. He was occasionally been leaving his wounds open to air. No complaints at this time. Denies fever, chills, erythema, warmth, or purulent drainage.      9/7/22 CV Toe Pressures:   · Right TBI 0.35, suggestive of severe right lower extremity arterial disease.  · Left TBI 0.14, is suggestive of severe left lower extremity arterial disease.  · Digit waveforms are mildly dampened on the left.    9/7/22 Ultrasound Doppler Arterial:  · There is scattered atherosclerotic plaque throughout the bilateral lower extremity arteries.  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  · Right anterior tibial artery stenosis, greater than 75%.  · Right posterior tibial artery is occluded.  · Right peroneal artery not visualized, possibly occluded.  · Left tibio peroneal trunk artery stenosis, 50-75%.  · Left anterior tibial artery stenosis, greater than 75%.  · Left posterior tibial artery is occluded.      Review of Systems   Constitutional:  Negative for activity change, chills, diaphoresis, fatigue and fever.   Respiratory:  Negative for apnea, chest tightness and shortness of breath.    Cardiovascular:  Positive for leg swelling. Negative for chest pain and palpitations.   Musculoskeletal:  Negative for gait problem and joint swelling.   Skin:  Positive for wound. Negative for pallor and rash.   Neurological:  Negative for syncope, weakness and numbness.   Psychiatric/Behavioral:  Negative for  agitation. The patient is not nervous/anxious.    All other systems reviewed and are negative.      Objective:      Physical Exam  Vitals reviewed.   Constitutional:       General: He is not in acute distress.     Appearance: Normal appearance.   HENT:      Right Ear: Decreased hearing noted.      Left Ear: Decreased hearing noted.   Cardiovascular:      Rate and Rhythm: Normal rate and regular rhythm.   Pulmonary:      Effort: No respiratory distress.   Musculoskeletal:         General: No swelling.      Right lower leg: Edema present.      Left lower leg: Edema present.   Skin:     General: Skin is warm and dry.      Findings: Wound present. No erythema.          Neurological:      General: No focal deficit present.      Mental Status: He is alert and oriented to person, place, and time.   Psychiatric:         Mood and Affect: Mood normal.         Behavior: Behavior normal.         Thought Content: Thought content normal.         Judgment: Judgment normal.         Assessment:       1. Venous stasis ulcers of both lower extremities    2. Edema of both lower extremities    3. PAD (peripheral artery disease)    4. Acute diastolic heart failure    5. Venous stasis dermatitis of both lower extremities    6. Hypertension, unspecified type                 Altered Skin Integrity Right anterior;lower Leg (Active)       Altered Skin Integrity Present on Admission - Did Patient arrive to the hospital with altered skin?:    Side: Right   Orientation: anterior;lower   Location: Leg   Wound Number:    Is this injury device related?:    Primary Wound Type:    Description of Altered Skin Integrity:    Ankle-Brachial Index:    Pulses:    Removal Indication and Assessment:    Wound Outcome:    (Retired) Wound Length (cm):    (Retired) Wound Width (cm):    (Retired) Depth (cm):    Wound Description (Comments):    Removal Indications:    Wound Image   02/07/24 1532   Dressing Appearance Dry;Intact;Clean;Moist drainage 02/07/24 1532    Drainage Amount Small 02/07/24 1532   Drainage Characteristics/Odor Serous 02/07/24 1532   Red (%), Wound Tissue Color 100 % 02/07/24 1532   Periwound Area Intact;Hemosiderin Staining;Edematous;Pink 02/07/24 1532   Wound Length (cm) 0.2 cm 02/07/24 1532   Wound Width (cm) 0.2 cm 02/07/24 1532   Wound Depth (cm) 0.1 cm 02/07/24 1532   Wound Volume (cm^3) 0.004 cm^3 02/07/24 1532   Wound Surface Area (cm^2) 0.04 cm^2 02/07/24 1532   Care Cleansed with:;Soap and water 02/07/24 1532   Dressing Applied;Silicone;Island/border 02/07/24 1532   Periwound Care Skin barrier film applied 02/07/24 1532            Altered Skin Integrity Left lower;medial Leg (Active)       Altered Skin Integrity Present on Admission - Did Patient arrive to the hospital with altered skin?:    Side: Left   Orientation: lower;medial   Location: Leg   Wound Number:    Is this injury device related?:    Primary Wound Type:    Description of Altered Skin Integrity:    Ankle-Brachial Index:    Pulses:    Removal Indication and Assessment:    Wound Outcome:    (Retired) Wound Length (cm):    (Retired) Wound Width (cm):    (Retired) Depth (cm):    Wound Description (Comments):    Removal Indications:    Dressing Appearance Dry;Intact;Clean;Moist drainage 02/07/24 1532   Drainage Amount Small 02/07/24 1532   Drainage Characteristics/Odor Serous 02/07/24 1532   Red (%), Wound Tissue Color 100 % 02/07/24 1532   Periwound Area Intact;Edematous;Pink 02/07/24 1532   Wound Length (cm) 0.3 cm 02/07/24 1532   Wound Width (cm) 0.3 cm 02/07/24 1532   Wound Depth (cm) 0.1 cm 02/07/24 1532   Wound Volume (cm^3) 0.009 cm^3 02/07/24 1532   Wound Surface Area (cm^2) 0.09 cm^2 02/07/24 1532   Care Cleansed with:;Soap and water 02/07/24 1532   Dressing Applied;Silicone;Island/border 02/07/24 1532   Periwound Care Skin barrier film applied 02/07/24 1532            Altered Skin Integrity Left lower;lateral Leg (Active)       Altered Skin Integrity Present on Admission -  Did Patient arrive to the hospital with altered skin?:    Side: Left   Orientation: lower;lateral   Location: Leg   Wound Number:    Is this injury device related?:    Primary Wound Type:    Description of Altered Skin Integrity:    Ankle-Brachial Index:    Pulses:    Removal Indication and Assessment:    Wound Outcome:    (Retired) Wound Length (cm):    (Retired) Wound Width (cm):    (Retired) Depth (cm):    Wound Description (Comments):    Removal Indications:    Wound Image   02/07/24 1532   Dressing Appearance Dry;Intact;Clean;Moist drainage 02/07/24 1532   Drainage Amount Small 02/07/24 1532   Drainage Characteristics/Odor Serous 02/07/24 1532   Red (%), Wound Tissue Color 100 % 02/07/24 1532   Periwound Area Intact;Hemosiderin Staining;Edematous;Pink 02/07/24 1532   Wound Length (cm) 0.1 cm 02/07/24 1532   Wound Width (cm) 0.4 cm 02/07/24 1532   Wound Depth (cm) 0.1 cm 02/07/24 1532   Wound Volume (cm^3) 0.004 cm^3 02/07/24 1532   Wound Surface Area (cm^2) 0.04 cm^2 02/07/24 1532   Care Cleansed with:;Soap and water 02/07/24 1532   Dressing Applied;Island/border;Silicone 02/07/24 1532   Periwound Care Skin barrier film applied 02/07/24 1532             Claude was seen in the clinic room and placed in the supine position on the treatment table.  The dressings were removed.The legs were cleansed with Easi-clense sponges and dried thoroughly.  No erythema, warmth, or odor noted.     Plan of Care:   Lower extremity wounds: Skin prep to periwounds and covered with an mepilex border dressing.  Right lower extremity bulla: Skin prep to periwound and covered with an aquacel border dressing.  Buttock wound: Calmoseptine to periwound and covered with an aquacel border dressing.    Plan:       Bilateral lower extremities and left buttocks was dressed as detailed above.  Patient was instructed to not get the dressings wet and to use cast covers for showering.  Should the dressing become wet, he is to remove it, place  another aquacel border dressing to the area. He should then notify this office as soon as possible to have a new dressing applied.    Discussed PAD with patient. Discussed toe pressure, venous ultrasound, and arterial ultrasound results with patient and patient's son. Unable to utilize therapeutic compression due to severe bilateral lower extremity arterial disease. Instructed patient to keep appointments with vascular medicine.     Instructed patient to elevate legs whenever sedentary and follow a low sodium diet.       Discussed nutrition and the role of protein in wound healing with the patient. Instructed patient to continue protein regimen for wound healing.     Instructed patient to continue taking bumetanide as prescribed.     Faxed home health orders   HOME HEALTH WOUND CARE ORDERS  D/C previous orders  Wound care and nurse visits  2  X a week and PRN complications  Wound location-  bilateral lower extremities and left buttocks  1. Cleanse wound with saline or wound cleanser    ( pt may shower)  2.Apply:  Lower extremity wounds: Skin prep to periwounds and covered with an mepilex border dressing.  Right lower extremity bulla: Skin prep to periwound and covered with an aquacel border dressing.  Buttock wound: Calmoseptine to periwound and covered with an aquacel border dressing.   Do not apply compression.  3.Cover with appropriate cover dressing and contact the office for any substituions in dressings  Monitor for infection, teach patient/caregiver signs and symptoms, as well as how to do dressing changes    Written and verbal instructions given to patient.  RTC in 1 month or sooner if needed    Aneta Olmstead PA-C

## 2024-02-21 RX ORDER — LEVOTHYROXINE SODIUM 88 UG/1
88 TABLET ORAL
Qty: 90 TABLET | Refills: 1 | Status: SHIPPED | OUTPATIENT
Start: 2024-02-21 | End: 2024-02-27

## 2024-02-21 NOTE — TELEPHONE ENCOUNTER
Refill Routing Note   Medication(s) are not appropriate for processing by [facility] for the following reason(s):        Required labs outdated  No active prescription written by provider    [facility] action(s):  Defer     Requires labs : Yes      Medication Therapy Plan:  on the med list 23.      Appointments  past 12m or future 3m with PCP    Date Provider   Last Visit   2023 Seb Gray MD   Next Visit   2024 Seb Gray MD   ED visits in past 90 days: 0        Note composed:1:01 PM 2024

## 2024-02-21 NOTE — TELEPHONE ENCOUNTER
Care Due:                  Date            Visit Type   Department     Provider  --------------------------------------------------------------------------------                                EP -                              PRIMARY      [facility] INTERNAL  Last Visit: 07-      CARE ([facility])   SYDNIE Gray                              EP -                              PRIMARY      [facility] INTERNAL  Next Visit: 02-      CARE ([facility])   SYDNIE Gray                                                            Last  Test          Frequency    Reason                     Performed    Due Date  --------------------------------------------------------------------------------    TSH.........  12 months..  levothyroxine............  11-   11-    [facility] Embedded Care Due Messages. Reference number: [id number].   2/21/2024 9:40:14 AM CST

## 2024-02-22 ENCOUNTER — DOCUMENT SCAN (OUTPATIENT)
Dept: HOME HEALTH SERVICES | Facility: HOSPITAL | Age: 89
End: 2024-02-22
Payer: MEDICARE

## 2024-02-26 ENCOUNTER — PATIENT MESSAGE (OUTPATIENT)
Dept: INTERNAL MEDICINE | Facility: CLINIC | Age: 89
End: 2024-02-26

## 2024-02-26 ENCOUNTER — OFFICE VISIT (OUTPATIENT)
Dept: INTERNAL MEDICINE | Facility: CLINIC | Age: 89
End: 2024-02-26
Payer: MEDICARE

## 2024-02-26 ENCOUNTER — LAB VISIT (OUTPATIENT)
Dept: LAB | Facility: HOSPITAL | Age: 89
End: 2024-02-26
Attending: INTERNAL MEDICINE
Payer: MEDICARE

## 2024-02-26 VITALS
WEIGHT: 174.19 LBS | RESPIRATION RATE: 18 BRPM | SYSTOLIC BLOOD PRESSURE: 122 MMHG | OXYGEN SATURATION: 97 % | HEART RATE: 75 BPM | BODY MASS INDEX: 27.34 KG/M2 | TEMPERATURE: 98 F | HEIGHT: 67 IN | DIASTOLIC BLOOD PRESSURE: 66 MMHG

## 2024-02-26 DIAGNOSIS — E11.9 TYPE 2 DIABETES MELLITUS WITHOUT COMPLICATION, WITHOUT LONG-TERM CURRENT USE OF INSULIN: Primary | Chronic | ICD-10-CM

## 2024-02-26 DIAGNOSIS — R53.1 WEAKNESS: ICD-10-CM

## 2024-02-26 DIAGNOSIS — E11.51 TYPE 2 DIABETES MELLITUS WITH DIABETIC PERIPHERAL ANGIOPATHY WITHOUT GANGRENE, WITHOUT LONG-TERM CURRENT USE OF INSULIN: ICD-10-CM

## 2024-02-26 DIAGNOSIS — M79.89 SWELLING OF BOTH LOWER EXTREMITIES: ICD-10-CM

## 2024-02-26 DIAGNOSIS — E11.9 TYPE 2 DIABETES MELLITUS WITHOUT COMPLICATION, WITHOUT LONG-TERM CURRENT USE OF INSULIN: Chronic | ICD-10-CM

## 2024-02-26 DIAGNOSIS — R56.9 SEIZURE: ICD-10-CM

## 2024-02-26 DIAGNOSIS — I10 PRIMARY HYPERTENSION: Chronic | ICD-10-CM

## 2024-02-26 DIAGNOSIS — I70.248 ATHEROSCLEROSIS OF NATIVE ARTERIES OF LEFT LEG WITH ULCERATION OF OTHER PART OF LOWER LEG: ICD-10-CM

## 2024-02-26 DIAGNOSIS — I73.9 PAD (PERIPHERAL ARTERY DISEASE): Chronic | ICD-10-CM

## 2024-02-26 DIAGNOSIS — I82.5Y1 CHRONIC DEEP VEIN THROMBOSIS (DVT) OF PROXIMAL VEIN OF RIGHT LOWER EXTREMITY: ICD-10-CM

## 2024-02-26 DIAGNOSIS — I20.9 ANGINA PECTORIS: ICD-10-CM

## 2024-02-26 DIAGNOSIS — E03.8 OTHER SPECIFIED HYPOTHYROIDISM: Chronic | ICD-10-CM

## 2024-02-26 LAB
ALBUMIN/CREAT UR: 32.4 UG/MG (ref 0–30)
CREAT UR-MCNC: 68 MG/DL (ref 23–375)
MICROALBUMIN UR DL<=1MG/L-MCNC: 22 UG/ML

## 2024-02-26 PROCEDURE — 82043 UR ALBUMIN QUANTITATIVE: CPT | Performed by: INTERNAL MEDICINE

## 2024-02-26 PROCEDURE — 99999 PR PBB SHADOW E&M-EST. PATIENT-LVL IV: CPT | Mod: PBBFAC,,, | Performed by: INTERNAL MEDICINE

## 2024-02-26 PROCEDURE — 99214 OFFICE O/P EST MOD 30 MIN: CPT | Mod: S$GLB,,, | Performed by: INTERNAL MEDICINE

## 2024-02-26 NOTE — PROGRESS NOTES
Subjective:       Patient ID: Claude T Dupuis is a 89 y.o. male.    Chief Complaint: Follow-up    HPI    89-year-old male with seizures, chronic DVT of right lower extremity, angina pectoris, atherosclerosis of leg here for follow-up.    Diabetes-diet and exercise controlled.  He does not check her BG.  Lab Results   Component Value Date    HGBA1C 6.8 (H) 07/07/2023    HGBA1C 7.5 (H) 11/15/2022     Lab Results   Component Value Date    LDLCALC 87.2 10/26/2022    CREATININE 1.0 10/04/2023     HTN -  Patient's co morbidities include:  Diabetes. Patient is currently on Lopressor 100 mg b.i.d.. He does have a nurse occasionally check his BP at home, and it runs 103 systolic today. Side effects of medications note: none. Denies headaches, blurred vision, chest pain, shortness of breath, nausea.    Patient has hypothyroid and is on Synthroid 88 mcg daily.    Patient has peripheral arterial disease and is on Lipitor 40 mg daily.    He was doing well with home health PT.  He was discharged from home health 2 weeks ago for PT.      He has swelling of the lower extremities.      Review of Systems      Objective:      Physical Exam  Vitals reviewed.   Constitutional:       Appearance: He is well-developed.   HENT:      Head: Normocephalic and atraumatic.      Mouth/Throat:      Pharynx: No oropharyngeal exudate.   Eyes:      General: No scleral icterus.        Right eye: No discharge.         Left eye: No discharge.      Pupils: Pupils are equal, round, and reactive to light.   Neck:      Thyroid: No thyromegaly.      Trachea: No tracheal deviation.   Cardiovascular:      Rate and Rhythm: Normal rate and regular rhythm.      Heart sounds: Normal heart sounds. No murmur heard.     No friction rub. No gallop.   Pulmonary:      Effort: Pulmonary effort is normal. No respiratory distress.      Breath sounds: Normal breath sounds. No wheezing or rales.   Chest:      Chest wall: No tenderness.   Abdominal:      General: Bowel sounds  are normal. There is no distension.      Palpations: Abdomen is soft. There is no mass.      Tenderness: There is no abdominal tenderness. There is no guarding or rebound.   Musculoskeletal:         General: Swelling (2+ pitting edema) present. No tenderness. Normal range of motion.      Cervical back: Normal range of motion and neck supple.   Skin:     General: Skin is warm and dry.      Coloration: Skin is not pale.      Findings: No erythema or rash.   Neurological:      Mental Status: He is alert and oriented to person, place, and time.   Psychiatric:         Behavior: Behavior normal.         Assessment:       1. Type 2 diabetes mellitus without complication, without long-term current use of insulin  - Comprehensive Metabolic Panel; Future  - Hemoglobin A1C; Future  - Microalbumin/Creatinine Ratio, Urine; Future  - Lipid Panel; Future  - CBC Auto Differential; Future  - TSH; Future    2. Primary hypertension    3. PAD (peripheral artery disease)    4. Other specified hypothyroidism    5. Weakness    6. Swelling of both lower extremities  - Echo Saline Bubble? No; Future    7. Type 2 diabetes mellitus with diabetic peripheral angiopathy without gangrene, without long-term current use of insulin    8. Seizure    9. 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 Angina pectoris    11. 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 Check CBC, CMP, TSH, lipids, A1c.  Discussed diet and exercise discussed vaccines.  Diet and exercise controlled.    2.  Continue Lopressor 100 mg b.i.d..    3.  Continue Lipitor 40 mg p.o.   4. Continue Synthroid 88 mcg daily.    5. Continue Bumex 0.5 mg b.i.d..  Check 2D echo with color-flow Doppler.  8. Monitor.    9.  Monitor  10.  Continue Imdur 30 mg p.o.   11. Continue Lipitor 40 mg p.o.

## 2024-02-27 ENCOUNTER — TELEPHONE (OUTPATIENT)
Dept: INTERNAL MEDICINE | Facility: CLINIC | Age: 89
End: 2024-02-27
Payer: MEDICARE

## 2024-02-27 DIAGNOSIS — I83.019 VENOUS STASIS ULCERS OF BOTH LOWER EXTREMITIES: ICD-10-CM

## 2024-02-27 DIAGNOSIS — L97.919 VENOUS STASIS ULCERS OF BOTH LOWER EXTREMITIES: ICD-10-CM

## 2024-02-27 DIAGNOSIS — I83.029 VENOUS STASIS ULCERS OF BOTH LOWER EXTREMITIES: ICD-10-CM

## 2024-02-27 DIAGNOSIS — I73.9 PAD (PERIPHERAL ARTERY DISEASE): Chronic | ICD-10-CM

## 2024-02-27 DIAGNOSIS — R60.0 EDEMA OF BOTH LOWER EXTREMITIES: Primary | ICD-10-CM

## 2024-02-27 DIAGNOSIS — L97.929 VENOUS STASIS ULCERS OF BOTH LOWER EXTREMITIES: ICD-10-CM

## 2024-02-27 DIAGNOSIS — E03.8 OTHER SPECIFIED HYPOTHYROIDISM: Primary | Chronic | ICD-10-CM

## 2024-02-27 RX ORDER — LEVOTHYROXINE SODIUM 100 UG/1
100 TABLET ORAL
Qty: 30 TABLET | Refills: 11 | Status: SHIPPED | OUTPATIENT
Start: 2024-02-27 | End: 2025-02-26

## 2024-02-27 NOTE — TELEPHONE ENCOUNTER
Spoke with the pt son, advised of med changes, [facility] needs new orders to continue PT  with pt, it was stopped because patient became better, then after two weeks problems restarted.

## 2024-02-27 NOTE — TELEPHONE ENCOUNTER
Please call concerned care and ask them why patient was discharged from home health PT.  If possible, add back.  If not possible, may need outpatient PT.

## 2024-02-27 NOTE — PROGRESS NOTES
Your thyroid function is suppressed with the T4 being low.  I am going to increase your Synthroid, and recheck the TSH in 3 months.  Your electrolytes, kidney/liver function, cholesterol are normal.  A1c indicates good control diabetes.

## 2024-02-28 ENCOUNTER — DOCUMENT SCAN (OUTPATIENT)
Dept: HOME HEALTH SERVICES | Facility: HOSPITAL | Age: 89
End: 2024-02-28
Payer: MEDICARE

## 2024-03-06 ENCOUNTER — OFFICE VISIT (OUTPATIENT)
Dept: WOUND CARE | Facility: CLINIC | Age: 89
End: 2024-03-06
Payer: MEDICARE

## 2024-03-06 VITALS
SYSTOLIC BLOOD PRESSURE: 159 MMHG | BODY MASS INDEX: 27.44 KG/M2 | WEIGHT: 174.81 LBS | TEMPERATURE: 98 F | HEART RATE: 61 BPM | DIASTOLIC BLOOD PRESSURE: 73 MMHG | HEIGHT: 67 IN

## 2024-03-06 DIAGNOSIS — I83.029 VENOUS STASIS ULCERS OF BOTH LOWER EXTREMITIES: Primary | ICD-10-CM

## 2024-03-06 DIAGNOSIS — I87.2 VENOUS STASIS DERMATITIS OF BOTH LOWER EXTREMITIES: ICD-10-CM

## 2024-03-06 DIAGNOSIS — I50.31 ACUTE DIASTOLIC HEART FAILURE: ICD-10-CM

## 2024-03-06 DIAGNOSIS — I73.9 PAD (PERIPHERAL ARTERY DISEASE): ICD-10-CM

## 2024-03-06 DIAGNOSIS — L97.919 VENOUS STASIS ULCERS OF BOTH LOWER EXTREMITIES: Primary | ICD-10-CM

## 2024-03-06 DIAGNOSIS — I10 HYPERTENSION, UNSPECIFIED TYPE: ICD-10-CM

## 2024-03-06 DIAGNOSIS — L97.929 VENOUS STASIS ULCERS OF BOTH LOWER EXTREMITIES: Primary | ICD-10-CM

## 2024-03-06 DIAGNOSIS — R60.0 EDEMA OF BOTH LOWER EXTREMITIES: ICD-10-CM

## 2024-03-06 DIAGNOSIS — I83.019 VENOUS STASIS ULCERS OF BOTH LOWER EXTREMITIES: Primary | ICD-10-CM

## 2024-03-06 PROCEDURE — 99999 PR PBB SHADOW E&M-EST. PATIENT-LVL III: CPT | Mod: PBBFAC,,,

## 2024-03-06 PROCEDURE — 99214 OFFICE O/P EST MOD 30 MIN: CPT | Mod: S$GLB,,,

## 2024-03-06 NOTE — PROGRESS NOTES
Subjective:       Patient ID: Claude T Dupuis is a 89 y.o. male     Chief Complaint: Wound Check      Patient presents with his son for a re-evaluation of a bilateral lower extremity wounds and left buttock wound. Patient follows with Dr. Lewis for PAD. Patient followed by Dr. Kothari- left greater saphenous ablation was scheduled for 12/22/22. Pt cancelled appointment. He is unsure when the wounds formed anywhere from March - May. Patient was dressing his wounds with urgotul and cleansing wounds with vashe. He was occasionally been leaving his wounds open to air. No complaints at this time. Denies fever, chills, erythema, warmth, or purulent drainage.      9/7/22 CV Toe Pressures:   · Right TBI 0.35, suggestive of severe right lower extremity arterial disease.  · Left TBI 0.14, is suggestive of severe left lower extremity arterial disease.  · Digit waveforms are mildly dampened on the left.    9/7/22 Ultrasound Doppler Arterial:  · There is scattered atherosclerotic plaque throughout the bilateral lower extremity arteries.  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  · Right anterior tibial artery stenosis, greater than 75%.  · Right posterior tibial artery is occluded.  · Right peroneal artery not visualized, possibly occluded.  · Left tibio peroneal trunk artery stenosis, 50-75%.  · Left anterior tibial artery stenosis, greater than 75%.  · Left posterior tibial artery is occluded.      Review of Systems   Constitutional:  Negative for activity change, chills, diaphoresis, fatigue and fever.   Respiratory:  Negative for apnea, chest tightness and shortness of breath.    Cardiovascular:  Positive for leg swelling. Negative for chest pain and palpitations.   Musculoskeletal:  Negative for gait problem and joint swelling.   Skin:  Positive for wound. Negative for pallor and rash.   Neurological:  Negative for syncope, weakness and numbness.   Psychiatric/Behavioral:  Negative for  agitation. The patient is not nervous/anxious.    All other systems reviewed and are negative.      Objective:      Physical Exam  Vitals reviewed.   Constitutional:       General: He is not in acute distress.     Appearance: Normal appearance.   HENT:      Right Ear: Decreased hearing noted.      Left Ear: Decreased hearing noted.   Cardiovascular:      Rate and Rhythm: Normal rate and regular rhythm.   Pulmonary:      Effort: No respiratory distress.   Musculoskeletal:         General: No swelling.      Right lower leg: Edema present.      Left lower leg: Edema present.   Skin:     General: Skin is warm and dry.      Findings: Wound present. No erythema.          Neurological:      General: No focal deficit present.      Mental Status: He is alert and oriented to person, place, and time.   Psychiatric:         Mood and Affect: Mood normal.         Behavior: Behavior normal.         Thought Content: Thought content normal.         Judgment: Judgment normal.         Assessment:       1. Venous stasis ulcers of both lower extremities    2. Edema of both lower extremities    3. PAD (peripheral artery disease)    4. Acute diastolic heart failure    5. Venous stasis dermatitis of both lower extremities    6. Hypertension, unspecified type                   Altered Skin Integrity Right lower;lateral Leg (Active)       Altered Skin Integrity Present on Admission - Did Patient arrive to the hospital with altered skin?:    Side: Right   Orientation: lower;lateral   Location: Leg   Wound Number:    Is this injury device related?:    Primary Wound Type:    Description of Altered Skin Integrity:    Ankle-Brachial Index:    Pulses:    Removal Indication and Assessment:    Wound Outcome:    (Retired) Wound Length (cm):    (Retired) Wound Width (cm):    (Retired) Depth (cm):    Wound Description (Comments):    Removal Indications:    Wound Image   03/06/24 1615   Dressing Appearance Open to air 03/06/24 1615   Drainage Amount  Large 03/06/24 1615   Drainage Characteristics/Odor Serous 03/06/24 1615   Red (%), Wound Tissue Color 100 % 03/06/24 1615   Periwound Area Intact;Macerated;Edematous 03/06/24 1615   Wound Length (cm) 0.2 cm 03/06/24 1615   Wound Width (cm) 0.2 cm 03/06/24 1615   Wound Depth (cm) 0.1 cm 03/06/24 1615   Wound Volume (cm^3) 0.004 cm^3 03/06/24 1615   Wound Surface Area (cm^2) 0.04 cm^2 03/06/24 1615   Care Cleansed with:;Soap and water 03/06/24 1615   Dressing Applied;Calcium alginate;Island/border 03/06/24 1615   Periwound Care Skin barrier film applied 03/06/24 1615            Altered Skin Integrity Right lower;medial Leg (Active)       Altered Skin Integrity Present on Admission - Did Patient arrive to the hospital with altered skin?:    Side: Right   Orientation: lower;medial   Location: Leg   Wound Number:    Is this injury device related?:    Primary Wound Type:    Description of Altered Skin Integrity:    Ankle-Brachial Index:    Pulses:    Removal Indication and Assessment:    Wound Outcome:    (Retired) Wound Length (cm):    (Retired) Wound Width (cm):    (Retired) Depth (cm):    Wound Description (Comments):    Removal Indications:    Wound Image   03/06/24 1615   Dressing Appearance Dry;Intact;Clean;Moist drainage 03/06/24 1615   Drainage Amount Large 03/06/24 1615   Drainage Characteristics/Odor Serous 03/06/24 1615   Red (%), Wound Tissue Color 100 % 03/06/24 1615   Periwound Area Intact;Edematous;Pink;Macerated 03/06/24 1615   Wound Length (cm) 1.1 cm 03/06/24 1615   Wound Width (cm) 0.8 cm 03/06/24 1615   Wound Depth (cm) 0.1 cm 03/06/24 1615   Wound Volume (cm^3) 0.088 cm^3 03/06/24 1615   Wound Surface Area (cm^2) 0.88 cm^2 03/06/24 1615   Care Cleansed with:;Soap and water 03/06/24 1615   Dressing Applied;Calcium alginate;Island/border 03/06/24 1615   Periwound Care Skin barrier film applied 03/06/24 1615            Altered Skin Integrity Left lower;medial Leg (Active)       Altered Skin Integrity  Present on Admission - Did Patient arrive to the hospital with altered skin?:    Side: Left   Orientation: lower;medial   Location: Leg   Wound Number:    Is this injury device related?:    Primary Wound Type:    Description of Altered Skin Integrity:    Ankle-Brachial Index:    Pulses:    Removal Indication and Assessment:    Wound Outcome:    (Retired) Wound Length (cm):    (Retired) Wound Width (cm):    (Retired) Depth (cm):    Wound Description (Comments):    Removal Indications:    Wound Image   03/06/24 1615   Dressing Appearance Dry;Intact;Clean;Dried drainage 03/06/24 1615   Drainage Amount Small 03/06/24 1615   Drainage Characteristics/Odor Serous 03/06/24 1615   Red (%), Wound Tissue Color 100 % 03/06/24 1615   Periwound Area Intact;Edematous;Pink 03/06/24 1615   Wound Length (cm) 0.3 cm 03/06/24 1615   Wound Width (cm) 0.4 cm 03/06/24 1615   Wound Depth (cm) 0.1 cm 03/06/24 1615   Wound Volume (cm^3) 0.012 cm^3 03/06/24 1615   Wound Surface Area (cm^2) 0.12 cm^2 03/06/24 1615   Care Cleansed with:;Soap and water 03/06/24 1615   Dressing Applied;Calcium alginate;Island/border 03/06/24 1615   Periwound Care Skin barrier film applied 03/06/24 1615               Claude was seen in the clinic room and placed in the supine position on the treatment table.  The dressings were removed.The legs were cleansed with Easi-clense sponges and dried thoroughly.  No erythema, warmth, or odor noted.     Plan of Care:   Left lower extremity wound: Skin prep to periwound and covered with an mepilex border dressing.  Right lower extremity wounds: Skin prep to periwound and covered with an aquacel border dressing.      Plan:       Bilateral lower extremities was dressed as detailed above.  Patient was instructed to not get the dressings wet and to use cast covers for showering.  Should the dressing become wet, he is to remove it, place another aquacel border dressing to the area. He should then notify this office as soon as  possible to have a new dressing applied.    Discussed PAD with patient. Discussed toe pressure, venous ultrasound, and arterial ultrasound results with patient and patient's son. Unable to utilize therapeutic compression due to severe bilateral lower extremity arterial disease. Instructed patient to keep appointments with vascular medicine.     Instructed patient to elevate legs whenever sedentary and follow a low sodium diet.       Discussed nutrition and the role of protein in wound healing with the patient. Instructed patient to continue protein regimen for wound healing.     Instructed patient to continue taking bumetanide as prescribed.     Faxed home health orders   HOME HEALTH WOUND CARE ORDERS  D/C previous orders  Wound care and nurse visits  2  X a week and PRN complications  Wound location-  bilateral lower extremities and left buttocks  1. Cleanse wound with saline or wound cleanser    ( pt may shower)  2.Apply:  Left lower extremity wound: Skin prep to periwound and covered with an mepilex border dressing.  Right lower extremity wounds: Skin prep to periwound and covered with an aquacel border dressing.  Buttock wound: Calmoseptine to periwound and covered with an aquacel border dressing.   Do not apply compression.  3.Cover with appropriate cover dressing and contact the office for any substituions in dressings  Monitor for infection, teach patient/caregiver signs and symptoms, as well as how to do dressing changes    Written and verbal instructions given to patient.  RTC in 1 month or sooner if needed    Aneta Olmstead PA-C

## 2024-03-08 ENCOUNTER — PATIENT OUTREACH (OUTPATIENT)
Dept: ADMINISTRATIVE | Facility: HOSPITAL | Age: 89
End: 2024-03-08
Payer: MEDICARE

## 2024-03-08 NOTE — LETTER
AUTHORIZATION FOR RELEASE OF   CONFIDENTIAL INFORMATION    Dear Dr. Nicolas,    We are seeing Claude T Dupuis, date of birth 1928, in the clinic at [facility]. Seb Gray MD is the patient's PCP. Claude T Dupuis has an outstanding lab/procedure at the time we reviewed his chart. In order to help keep his health information updated, he has authorized us to request the following medical record(s):        (  )  MAMMOGRAM                                      (  )  COLONOSCOPY      (  )  PAP SMEAR                                          (  )  OUTSIDE LAB RESULTS     (  )  DEXA SCAN                                          ( x )  EYE EXAM            (  )  FOOT EXAM                                          (  )  ENTIRE RECORD     (  )  OUTSIDE IMMUNIZATIONS                 (  )  _______________         Please fax records to Seb Gray MD, [phone]     If you have any questions, please contact ALEKSANDR Jackson at [phone].           Patient Name: Claude T Dupuis  : 1928  Patient Phone #: [phone]

## 2024-03-11 RX ORDER — ISOSORBIDE MONONITRATE 30 MG/1
30 TABLET, EXTENDED RELEASE ORAL DAILY
Qty: 90 TABLET | Refills: 3 | Status: SHIPPED | OUTPATIENT
Start: 2024-03-11 | End: 2025-03-06

## 2024-03-19 ENCOUNTER — TELEPHONE (OUTPATIENT)
Dept: INTERNAL MEDICINE | Facility: CLINIC | Age: 89
End: 2024-03-19
Payer: MEDICARE

## 2024-03-19 NOTE — TELEPHONE ENCOUNTER
----- Message from Samy Rubio MA sent at 3/19/2024 12:43 PM CDT -----  Contact: [phone] Mikaela with [facility]    ----- Message -----  From: Siobhan Santos  Sent: 3/19/2024  10:17 AM CDT  To: [facility] Staff    Patient would like to get medical advice.  Symptoms (please be specific):   Mikaela states the pt son advised the pt has declined a little and is requesting home physical therapy. Mikaela states the order can be faxed to   How long have you had these symptoms: N/A  Would you like a call back, or a response through your [facility] portal?:   call back to [place]   Pharmacy name and phone # (copy from chart):   N/A  Comments:

## 2024-03-20 ENCOUNTER — TELEPHONE (OUTPATIENT)
Dept: INTERNAL MEDICINE | Facility: CLINIC | Age: 89
End: 2024-03-20
Payer: MEDICARE

## 2024-03-20 NOTE — TELEPHONE ENCOUNTER
Called [facility]. The patient was requesting orders for a home health aide to help bathe, and a physical therapist.

## 2024-03-20 NOTE — TELEPHONE ENCOUNTER
----- Message from Gautam Montana sent at 3/20/2024  9:30 AM CDT -----  1MEDICALADVICE     Patient is calling for Medical Advice regarding: [facility] calling on pt behalf about orders that were requested but never sent off     How long has patient had these symptoms:    Pharmacy name and phone#:    Would like response via [employer]: call back  [facility]   [phone]  Debbie     Comments:

## 2024-03-23 ENCOUNTER — PATIENT MESSAGE (OUTPATIENT)
Dept: INTERNAL MEDICINE | Facility: CLINIC | Age: 89
End: 2024-03-23
Payer: MEDICARE

## 2024-03-28 ENCOUNTER — PATIENT MESSAGE (OUTPATIENT)
Dept: INTERNAL MEDICINE | Facility: CLINIC | Age: 89
End: 2024-03-28
Payer: MEDICARE

## 2024-03-28 ENCOUNTER — EXTERNAL HOME HEALTH (OUTPATIENT)
Dept: HOME HEALTH SERVICES | Facility: HOSPITAL | Age: 89
End: 2024-03-28
Payer: MEDICARE

## 2024-03-28 RX ORDER — QUETIAPINE FUMARATE 25 MG/1
25 TABLET, FILM COATED ORAL NIGHTLY PRN
Qty: 30 TABLET | Refills: 11 | Status: SHIPPED | OUTPATIENT
Start: 2024-03-28 | End: 2024-03-29 | Stop reason: SDUPTHER

## 2024-03-28 NOTE — TELEPHONE ENCOUNTER
Patient's son is requesting advice or perhaps a medication to help with the patient's confusion.     LOV: 02/26/2024

## 2024-03-29 RX ORDER — QUETIAPINE FUMARATE 25 MG/1
25 TABLET, FILM COATED ORAL NIGHTLY PRN
Qty: 30 TABLET | Refills: 11 | Status: SHIPPED | OUTPATIENT
Start: 2024-03-29 | End: 2024-04-03

## 2024-04-03 ENCOUNTER — OFFICE VISIT (OUTPATIENT)
Dept: WOUND CARE | Facility: CLINIC | Age: 89
End: 2024-04-03
Payer: MEDICARE

## 2024-04-03 VITALS
TEMPERATURE: 98 F | HEIGHT: 67 IN | DIASTOLIC BLOOD PRESSURE: 80 MMHG | BODY MASS INDEX: 27.38 KG/M2 | SYSTOLIC BLOOD PRESSURE: 140 MMHG | HEART RATE: 72 BPM

## 2024-04-03 DIAGNOSIS — L97.919 VENOUS STASIS ULCERS OF BOTH LOWER EXTREMITIES: Primary | ICD-10-CM

## 2024-04-03 DIAGNOSIS — I73.9 PAD (PERIPHERAL ARTERY DISEASE): ICD-10-CM

## 2024-04-03 DIAGNOSIS — S91.101A OPEN WOUND OF RIGHT GREAT TOE, INITIAL ENCOUNTER: ICD-10-CM

## 2024-04-03 DIAGNOSIS — I87.2 VENOUS INSUFFICIENCY OF LEFT LOWER EXTREMITY: ICD-10-CM

## 2024-04-03 DIAGNOSIS — R23.8 SKIN BULLA: ICD-10-CM

## 2024-04-03 DIAGNOSIS — I10 HYPERTENSION, UNSPECIFIED TYPE: ICD-10-CM

## 2024-04-03 DIAGNOSIS — R60.0 EDEMA OF BOTH LOWER EXTREMITIES: ICD-10-CM

## 2024-04-03 DIAGNOSIS — S31.829A BUTTOCK WOUND, LEFT, INITIAL ENCOUNTER: ICD-10-CM

## 2024-04-03 DIAGNOSIS — S91.102A OPEN WOUND OF LEFT GREAT TOE, INITIAL ENCOUNTER: ICD-10-CM

## 2024-04-03 DIAGNOSIS — L97.929 VENOUS STASIS ULCERS OF BOTH LOWER EXTREMITIES: Primary | ICD-10-CM

## 2024-04-03 DIAGNOSIS — I50.31 ACUTE DIASTOLIC HEART FAILURE: ICD-10-CM

## 2024-04-03 DIAGNOSIS — I83.029 VENOUS STASIS ULCERS OF BOTH LOWER EXTREMITIES: Primary | ICD-10-CM

## 2024-04-03 DIAGNOSIS — I83.019 VENOUS STASIS ULCERS OF BOTH LOWER EXTREMITIES: Primary | ICD-10-CM

## 2024-04-03 PROCEDURE — 1125F AMNT PAIN NOTED PAIN PRSNT: CPT | Mod: CPTII,S$GLB,,

## 2024-04-03 PROCEDURE — 99999 PR PBB SHADOW E&M-EST. PATIENT-LVL IV: CPT | Mod: PBBFAC,,,

## 2024-04-03 PROCEDURE — 1159F MED LIST DOCD IN RCRD: CPT | Mod: CPTII,S$GLB,,

## 2024-04-03 PROCEDURE — 3288F FALL RISK ASSESSMENT DOCD: CPT | Mod: CPTII,S$GLB,,

## 2024-04-03 PROCEDURE — 1101F PT FALLS ASSESS-DOCD LE1/YR: CPT | Mod: CPTII,S$GLB,,

## 2024-04-03 PROCEDURE — 99214 OFFICE O/P EST MOD 30 MIN: CPT | Mod: 25,S$GLB,,

## 2024-04-03 RX ORDER — OLANZAPINE 2.5 MG/1
2.5 TABLET ORAL NIGHTLY
Qty: 30 TABLET | Refills: 11 | Status: SHIPPED | OUTPATIENT
Start: 2024-04-03 | End: 2024-04-13

## 2024-04-03 NOTE — PROGRESS NOTES
Subjective:       Patient ID: Claude T Dupuis is a 89 y.o. male     Chief Complaint: Wound Check      Patient presents with his son for a re-evaluation of a bilateral lower extremity wounds and left buttock wound. Patient follows with Dr. Lewis for PAD. Patient followed by Dr. Kothari- left greater saphenous ablation was scheduled for 12/22/22. Pt cancelled appointment. He is unsure when the wounds formed anywhere from March - May. Patient was dressing his wounds with urgotul and cleansing wounds with vashe. He was occasionally been leaving his wounds open to air. His son reports a decreased in cognitive function and decrease in mobility. His son reports two new great toe wounds. They are unsure how and when they started. Denies fever, chills, erythema, warmth, or purulent drainage.      9/7/22 CV Toe Pressures:   · Right TBI 0.35, suggestive of severe right lower extremity arterial disease.  · Left TBI 0.14, is suggestive of severe left lower extremity arterial disease.  · Digit waveforms are mildly dampened on the left.    9/7/22 Ultrasound Doppler Arterial:  · There is scattered atherosclerotic plaque throughout the bilateral lower extremity arteries.  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  · Right anterior tibial artery stenosis, greater than 75%.  · Right posterior tibial artery is occluded.  · Right peroneal artery not visualized, possibly occluded.  · Left tibio peroneal trunk artery stenosis, 50-75%.  · Left anterior tibial artery stenosis, greater than 75%.  · Left posterior tibial artery is occluded.      Review of Systems   Constitutional:  Negative for activity change, chills, diaphoresis, fatigue and fever.   Respiratory:  Negative for apnea, chest tightness and shortness of breath.    Cardiovascular:  Positive for leg swelling. Negative for chest pain and palpitations.   Musculoskeletal:  Negative for gait problem and joint swelling.   Skin:  Positive for wound.  Negative for pallor and rash.   Neurological:  Negative for syncope, weakness and numbness.   Psychiatric/Behavioral:  Negative for agitation. The patient is not nervous/anxious.    All other systems reviewed and are negative.      Objective:      Physical Exam  Vitals reviewed.   Constitutional:       General: He is not in acute distress.     Appearance: Normal appearance.   HENT:      Right Ear: Decreased hearing noted.      Left Ear: Decreased hearing noted.   Cardiovascular:      Rate and Rhythm: Normal rate and regular rhythm.   Pulmonary:      Effort: No respiratory distress.   Musculoskeletal:         General: No swelling.      Right lower leg: Edema present.      Left lower leg: Edema present.   Skin:     General: Skin is warm and dry.      Findings: Wound present. No erythema.          Neurological:      General: No focal deficit present.      Mental Status: He is alert and oriented to person, place, and time.   Psychiatric:         Mood and Affect: Mood normal.         Behavior: Behavior normal.         Thought Content: Thought content normal.         Judgment: Judgment normal.         Assessment:       1. Venous stasis ulcers of both lower extremities    2. Buttock wound, left, initial encounter    3. Edema of both lower extremities    4. PAD (peripheral artery disease)    5. Acute diastolic heart failure    6. Hypertension, unspecified type    7. Skin bulla    8. Open wound of right great toe, initial encounter    9. Open wound of left great toe, initial encounter    10. Venous insufficiency of left lower extremity                   Altered Skin Integrity Right lower;lateral Leg (Active)       Altered Skin Integrity Present on Admission - Did Patient arrive to the hospital with altered skin?:    Side: Right   Orientation: lower;lateral   Location: Leg   Wound Number:    Is this injury device related?:    Primary Wound Type:    Description of Altered Skin Integrity:    Ankle-Brachial Index:    Pulses:     Removal Indication and Assessment:    Wound Outcome:    (Retired) Wound Length (cm):    (Retired) Wound Width (cm):    (Retired) Depth (cm):    Wound Description (Comments):    Removal Indications:    Wound Image   04/03/24 1639   Dressing Appearance Dry;Intact;Clean;Moist drainage 04/03/24 1639   Drainage Amount Large 04/03/24 1639   Drainage Characteristics/Odor Serous 04/03/24 1639   Red (%), Wound Tissue Color 100 % 04/03/24 1639   Periwound Area Intact;Edematous;Macerated 04/03/24 1639   Wound Length (cm) 1.8 cm 04/03/24 1639   Wound Width (cm) 1.2 cm 04/03/24 1639   Wound Depth (cm) 0.1 cm 04/03/24 1639   Wound Volume (cm^3) 0.216 cm^3 04/03/24 1639   Wound Surface Area (cm^2) 2.16 cm^2 04/03/24 1639   Care Cleansed with:;Soap and water 04/03/24 1639   Dressing Applied;Calcium alginate;Island/border 04/03/24 1639   Periwound Care Skin barrier film applied 04/03/24 1639            Altered Skin Integrity Right lower;medial Leg (Active)       Altered Skin Integrity Present on Admission - Did Patient arrive to the hospital with altered skin?:    Side: Right   Orientation: lower;medial   Location: Leg   Wound Number:    Is this injury device related?:    Primary Wound Type:    Description of Altered Skin Integrity:    Ankle-Brachial Index:    Pulses:    Removal Indication and Assessment:    Wound Outcome:    (Retired) Wound Length (cm):    (Retired) Wound Width (cm):    (Retired) Depth (cm):    Wound Description (Comments):    Removal Indications:    Wound Image   04/03/24 1639   Dressing Appearance Dry;Intact;Clean;Moist drainage 04/03/24 1639   Drainage Amount Large 04/03/24 1639   Drainage Characteristics/Odor Serous 04/03/24 1639   Red (%), Wound Tissue Color 100 % 04/03/24 1639   Periwound Area Intact;Macerated;Edematous 04/03/24 1639   Wound Length (cm) 1.9 cm 04/03/24 1639   Wound Width (cm) 0.9 cm 04/03/24 1639   Wound Depth (cm) 0.1 cm 04/03/24 1639   Wound Volume (cm^3) 0.171 cm^3 04/03/24 1639   Wound  Surface Area (cm^2) 1.71 cm^2 04/03/24 1639   Care Cleansed with:;Soap and water 04/03/24 1639   Dressing Applied;Calcium alginate;Island/border 04/03/24 1639   Periwound Care Skin barrier film applied 04/03/24 1639            Altered Skin Integrity Left anterior;lower Leg (Active)       Altered Skin Integrity Present on Admission - Did Patient arrive to the hospital with altered skin?:    Side: Left   Orientation: anterior;lower   Location: Leg   Wound Number:    Is this injury device related?:    Primary Wound Type:    Description of Altered Skin Integrity:    Ankle-Brachial Index:    Pulses:    Removal Indication and Assessment:    Wound Outcome:    (Retired) Wound Length (cm):    (Retired) Wound Width (cm):    (Retired) Depth (cm):    Wound Description (Comments):    Removal Indications:    Wound Image   04/03/24 1639   Dressing Appearance Dry;Intact;Clean;Moist drainage 04/03/24 1639   Drainage Amount Large 04/03/24 1639   Drainage Characteristics/Odor Serous 04/03/24 1639   Red (%), Wound Tissue Color 100 % 04/03/24 1639   Periwound Area Intact;Edematous 04/03/24 1639   Wound Length (cm) 0.5 cm 04/03/24 1639   Wound Width (cm) 0.2 cm 04/03/24 1639   Wound Depth (cm) 0.1 cm 04/03/24 1639   Wound Volume (cm^3) 0.01 cm^3 04/03/24 1639   Wound Surface Area (cm^2) 0.1 cm^2 04/03/24 1639   Care Cleansed with:;Soap and water 04/03/24 1639   Dressing Applied;Calcium alginate;Island/border 04/03/24 1639   Periwound Care Skin barrier film applied 04/03/24 1639            Altered Skin Integrity Left lower;medial Leg (Active)       Altered Skin Integrity Present on Admission - Did Patient arrive to the hospital with altered skin?:    Side: Left   Orientation: lower;medial   Location: Leg   Wound Number:    Is this injury device related?:    Primary Wound Type:    Description of Altered Skin Integrity:    Ankle-Brachial Index:    Pulses:    Removal Indication and Assessment:    Wound Outcome:    (Retired) Wound Length  (cm):    (Retired) Wound Width (cm):    (Retired) Depth (cm):    Wound Description (Comments):    Removal Indications:    Dressing Appearance Dry;Intact;Clean;Moist drainage 04/03/24 1639   Drainage Amount Large 04/03/24 1639   Drainage Characteristics/Odor Serous 04/03/24 1639   Red (%), Wound Tissue Color 100 % 04/03/24 1639   Periwound Area Edematous;Macerated 04/03/24 1639   Wound Length (cm) 1 cm 04/03/24 1639   Wound Width (cm) 0.9 cm 04/03/24 1639   Wound Depth (cm) 0.1 cm 04/03/24 1639   Wound Volume (cm^3) 0.09 cm^3 04/03/24 1639   Wound Surface Area (cm^2) 0.9 cm^2 04/03/24 1639   Care Cleansed with:;Soap and water 04/03/24 1639   Dressing Applied;Calcium alginate;Island/border 04/03/24 1639   Periwound Care Skin barrier film applied 04/03/24 1639            Altered Skin Integrity Left Buttocks (Active)       Altered Skin Integrity Present on Admission - Did Patient arrive to the hospital with altered skin?:    Side: Left   Orientation:    Location: Buttocks   Wound Number:    Is this injury device related?:    Primary Wound Type:    Description of Altered Skin Integrity:    Ankle-Brachial Index:    Pulses:    Removal Indication and Assessment:    Wound Outcome:    (Retired) Wound Length (cm):    (Retired) Wound Width (cm):    (Retired) Depth (cm):    Wound Description (Comments):    Removal Indications:    Wound Image   04/03/24 1645   Dressing Appearance Open to air 04/03/24 1645   Red (%), Wound Tissue Color 100 % 04/03/24 1645   Periwound Area Intact 04/03/24 1645   Wound Length (cm) 0.5 cm 04/03/24 1645   Wound Width (cm) 0.5 cm 04/03/24 1645   Wound Depth (cm) 0.1 cm 04/03/24 1645   Wound Volume (cm^3) 0.025 cm^3 04/03/24 1645   Wound Surface Area (cm^2) 0.25 cm^2 04/03/24 1645   Care Cleansed with:;Soap and water 04/03/24 1645   Dressing Applied;Calcium alginate;Island/[facility] 04/03/24 1645   Periwound Care Skin barrier film applied 04/03/24 1645            Altered Skin Integrity Right anterior Toe,  first (Active)       Altered Skin Integrity Present on Admission - Did Patient arrive to the hospital with altered skin?:    Side: Right   Orientation: anterior   Location: Toe, first   Wound Number:    Is this injury device related?:    Primary Wound Type:    Description of Altered Skin Integrity:    Ankle-Brachial Index:    Pulses:    Removal Indication and Assessment:    Wound Outcome:    (Retired) Wound Length (cm):    (Retired) Wound Width (cm):    (Retired) Depth (cm):    Wound Description (Comments):    Removal Indications:    Dressing Appearance Open to air 04/03/24 1639   Red (%), Wound Tissue Color 100 % 04/03/24 1639   Periwound Area Intact 04/03/24 1639   Wound Length (cm) 0.2 cm 04/03/24 1639   Wound Width (cm) 0.3 cm 04/03/24 1639   Wound Depth (cm) 0.1 cm 04/03/24 1639   Wound Volume (cm^3) 0.006 cm^3 04/03/24 1639   Wound Surface Area (cm^2) 0.06 cm^2 04/03/24 1639   Care Cleansed with:;Soap and water 04/03/24 1639   Dressing Applied;Foam;Island/border 04/03/24 1639   Periwound Care Skin barrier film applied 04/03/24 1639            Altered Skin Integrity Left anterior Toe, first (Active)       Altered Skin Integrity Present on Admission - Did Patient arrive to the hospital with altered skin?:    Side: Left   Orientation: anterior   Location: Toe, first   Wound Number:    Is this injury device related?:    Primary Wound Type:    Description of Altered Skin Integrity:    Ankle-Brachial Index:    Pulses:    Removal Indication and Assessment:    Wound Outcome:    (Retired) Wound Length (cm):    (Retired) Wound Width (cm):    (Retired) Depth (cm):    Wound Description (Comments):    Removal Indications:    Dressing Appearance Open to air 04/03/24 1639   Red (%), Wound Tissue Color 100 % 04/03/24 1639   Periwound Area Intact 04/03/24 1639   Wound Length (cm) 0.3 cm 04/03/24 1639   Wound Width (cm) 0.3 cm 04/03/24 1639   Wound Depth (cm) 0.1 cm 04/03/24 1639   Wound Volume (cm^3) 0.009 cm^3 04/03/24 1639    Wound Surface Area (cm^2) 0.09 cm^2 04/03/24 1639   Care Cleansed with:;Soap and water 04/03/24 1639   Dressing Applied;Foam;Island/border 04/03/24 1639   Periwound Care Skin barrier film applied 04/03/24 1639               Claude was seen in the clinic room and placed in the supine position on the treatment table.  The dressings were removed.The legs were cleansed with Easi-clense sponges and dried thoroughly.  No erythema, warmth, or odor noted. New bilateral great toe wounds noted.    Plan of Care:   Lower extremity and left buttocks wound: Skin prep and Calmoseptine to periwound and covered with an aquacel border dressing.  Bilateral great toe wounds: Hydrofera blue to wound beds, skin prep to periwound, and covered with island dressings.      Plan:       Bilateral lower extremities was dressed as detailed above.  Patient was instructed to not get the dressings wet and to use cast covers for showering.  Should the dressing become wet, he is to remove it, place another aquacel border dressing to the area. He should then notify this office as soon as possible to have a new dressing applied.    Discussed PAD and venous insufficiency with patient. Discussed toe pressure, venous ultrasound, and arterial ultrasound results with patient and patient's son. Unable to utilize therapeutic compression due to severe bilateral lower extremity arterial disease. Instructed patient to keep appointments with vascular medicine.    Pt previously followed with interventional cardiology- Dr. Kothari. Pt now has bilateral great toe wounds and decreased TBIs. Messaged Dr. Lewis- recommended referral to Dr. Feliciano. Referral placed.      Instructed patient to elevate legs whenever sedentary and follow a low sodium diet.       Discussed nutrition and the role of protein in wound healing with the patient. Instructed patient to continue protein regimen for wound healing.     Instructed patient to continue taking bumetanide as  prescribed.     Faxed home health orders   HOME HEALTH WOUND CARE ORDERS  D/C previous orders  Wound care and nurse visits  2  X a week and PRN complications  Wound location-  bilateral lower extremities and left buttocks  1. Cleanse wound with saline or wound cleanser    ( pt may shower)  2.Apply:  Lower extremity and left buttocks wound: Skin prep and Calmoseptine to periwound and covered with an aquacel border dressing.  Bilateral great toe wounds: Hydrofera blue to wound beds, skin prep to periwound, and covered with island dressings.   Do not apply compression.  3.Cover with appropriate cover dressing and contact the office for any substituions in dressings  Monitor for infection, teach patient/caregiver signs and symptoms, as well as how to do dressing changes      Written and verbal instructions given to patient.  RTC in 1 month or sooner if needed    Aneta Olmstead PA-C

## 2024-04-05 ENCOUNTER — TELEPHONE (OUTPATIENT)
Dept: CARDIOLOGY | Facility: CLINIC | Age: 89
End: 2024-04-05
Payer: MEDICARE

## 2024-04-05 NOTE — TELEPHONE ENCOUNTER
----- Message from Aneta Olmstead PA-C sent at 4/3/2024  4:40 PM CDT -----  Good afternoon,    I placed a referral for the attached patient. He has bilateral great toe wounds and PAD. Pt was previously following with Dr. Kothari. Could someone help assist in scheduling the patient an appointment? He would like to be seen at the [place] [facility].      Thank you,  Aneta Olmstead PA-C

## 2024-04-06 PROCEDURE — G0179 MD RECERTIFICATION HHA PT: HCPCS | Mod: ,,, | Performed by: INTERNAL MEDICINE

## 2024-04-09 ENCOUNTER — HOSPITAL ENCOUNTER (EMERGENCY)
Facility: HOSPITAL | Age: 89
Discharge: HOME | End: 2024-04-09
Attending: EMERGENCY MEDICINE
Payer: MEDICARE

## 2024-04-09 VITALS
SYSTOLIC BLOOD PRESSURE: 195 MMHG | HEIGHT: 67 IN | TEMPERATURE: 99 F | DIASTOLIC BLOOD PRESSURE: 82 MMHG | OXYGEN SATURATION: 96 % | HEART RATE: 72 BPM | WEIGHT: 174 LBS | RESPIRATION RATE: 18 BRPM | BODY MASS INDEX: 27.31 KG/M2

## 2024-04-09 DIAGNOSIS — M79.661 PAIN IN BOTH LOWER LEGS: ICD-10-CM

## 2024-04-09 DIAGNOSIS — R31.9 HEMATURIA, UNSPECIFIED TYPE: Primary | ICD-10-CM

## 2024-04-09 DIAGNOSIS — R73.9 HYPERGLYCEMIA: ICD-10-CM

## 2024-04-09 DIAGNOSIS — M79.662 PAIN IN BOTH LOWER LEGS: ICD-10-CM

## 2024-04-09 LAB
ALBUMIN SERPL BCP-MCNC: 2.4 G/DL (ref 3.5–5.2)
ALP SERPL-CCNC: 145 U/L (ref 55–135)
ALT SERPL W/O P-5'-P-CCNC: 18 U/L (ref 10–44)
ANION GAP SERPL CALC-SCNC: 8 MMOL/L (ref 8–16)
AST SERPL-CCNC: 20 U/L (ref 10–40)
BACTERIA #/AREA URNS HPF: ABNORMAL /HPF
BASOPHILS # BLD AUTO: 0.06 K/UL (ref 0–0.2)
BASOPHILS NFR BLD: 0.7 % (ref 0–1.9)
BILIRUB SERPL-MCNC: 0.5 MG/DL (ref 0.1–1)
BILIRUB UR QL STRIP: NEGATIVE
BUN SERPL-MCNC: 12 MG/DL (ref 10–30)
CALCIUM SERPL-MCNC: 8.8 MG/DL (ref 8.7–10.5)
CHLORIDE SERPL-SCNC: 103 MMOL/L (ref 95–110)
CK SERPL-CCNC: 39 U/L (ref 20–200)
CLARITY UR: ABNORMAL
CO2 SERPL-SCNC: 28 MMOL/L (ref 23–29)
COLOR UR: YELLOW
CREAT SERPL-MCNC: 1 MG/DL (ref 0.5–1.4)
DIFFERENTIAL METHOD BLD: ABNORMAL
EOSINOPHIL # BLD AUTO: 0.4 K/UL (ref 0–0.5)
EOSINOPHIL NFR BLD: 4.8 % (ref 0–8)
ERYTHROCYTE [DISTWIDTH] IN BLOOD BY AUTOMATED COUNT: 13.7 % (ref 11.5–14.5)
EST. GFR  (NO RACE VARIABLE): >60 ML/MIN/1.73 M^2
GLUCOSE SERPL-MCNC: 215 MG/DL (ref 70–110)
GLUCOSE UR QL STRIP: ABNORMAL
HCT VFR BLD AUTO: 37.9 % (ref 40–54)
HGB BLD-MCNC: 12.7 G/DL (ref 14–18)
HGB UR QL STRIP: ABNORMAL
HYALINE CASTS #/AREA URNS LPF: 0 /LPF
IMM GRANULOCYTES # BLD AUTO: 0.1 K/UL (ref 0–0.04)
IMM GRANULOCYTES NFR BLD AUTO: 1.1 % (ref 0–0.5)
INR PPP: 1.2 (ref 0.8–1.2)
KETONES UR QL STRIP: NEGATIVE
LEUKOCYTE ESTERASE UR QL STRIP: ABNORMAL
LYMPHOCYTES # BLD AUTO: 1.8 K/UL (ref 1–4.8)
LYMPHOCYTES NFR BLD: 19.7 % (ref 18–48)
MCH RBC QN AUTO: 30.2 PG (ref 27–31)
MCHC RBC AUTO-ENTMCNC: 33.5 G/DL (ref 32–36)
MCV RBC AUTO: 90 FL (ref 82–98)
MICROSCOPIC COMMENT: ABNORMAL
MONOCYTES # BLD AUTO: 0.8 K/UL (ref 0.3–1)
MONOCYTES NFR BLD: 8.6 % (ref 4–15)
NEUTROPHILS # BLD AUTO: 6 K/UL (ref 1.8–7.7)
NEUTROPHILS NFR BLD: 65.1 % (ref 38–73)
NITRITE UR QL STRIP: NEGATIVE
NRBC BLD-RTO: 0 /100 WBC
PH UR STRIP: 8 [PH] (ref 5–8)
PLATELET # BLD AUTO: 261 K/UL (ref 150–450)
PMV BLD AUTO: 9.8 FL (ref 9.2–12.9)
POTASSIUM SERPL-SCNC: 3.8 MMOL/L (ref 3.5–5.1)
PROT SERPL-MCNC: 6 G/DL (ref 6–8.4)
PROT UR QL STRIP: ABNORMAL
PROTHROMBIN TIME: 12.4 SEC (ref 9–12.5)
RBC # BLD AUTO: 4.2 M/UL (ref 4.6–6.2)
RBC #/AREA URNS HPF: >100 /HPF (ref 0–4)
SODIUM SERPL-SCNC: 139 MMOL/L (ref 136–145)
SP GR UR STRIP: 1.01 (ref 1–1.03)
UNIDENT CRYS URNS QL MICRO: 6
URN SPEC COLLECT METH UR: ABNORMAL
UROBILINOGEN UR STRIP-ACNC: ABNORMAL EU/DL
WBC # BLD AUTO: 9.23 K/UL (ref 3.9–12.7)
WBC #/AREA URNS HPF: 26 /HPF (ref 0–5)

## 2024-04-09 PROCEDURE — 80053 COMPREHEN METABOLIC PANEL: CPT | Performed by: EMERGENCY MEDICINE

## 2024-04-09 PROCEDURE — 99285 EMERGENCY DEPT VISIT HI MDM: CPT | Mod: 25

## 2024-04-09 PROCEDURE — 82550 ASSAY OF CK (CPK): CPT | Performed by: EMERGENCY MEDICINE

## 2024-04-09 PROCEDURE — 96365 THER/PROPH/DIAG IV INF INIT: CPT | Mod: 59

## 2024-04-09 PROCEDURE — 63600175 PHARM REV CODE 636 W HCPCS: Performed by: EMERGENCY MEDICINE

## 2024-04-09 PROCEDURE — 25000003 PHARM REV CODE 250: Performed by: EMERGENCY MEDICINE

## 2024-04-09 PROCEDURE — 81000 URINALYSIS NONAUTO W/SCOPE: CPT | Performed by: EMERGENCY MEDICINE

## 2024-04-09 PROCEDURE — 85025 COMPLETE CBC W/AUTO DIFF WBC: CPT | Performed by: EMERGENCY MEDICINE

## 2024-04-09 PROCEDURE — 25500020 PHARM REV CODE 255: Performed by: EMERGENCY MEDICINE

## 2024-04-09 PROCEDURE — 85610 PROTHROMBIN TIME: CPT | Performed by: EMERGENCY MEDICINE

## 2024-04-09 PROCEDURE — 87086 URINE CULTURE/COLONY COUNT: CPT | Performed by: EMERGENCY MEDICINE

## 2024-04-09 RX ORDER — HYDROCODONE BITARTRATE AND ACETAMINOPHEN 5; 325 MG/1; MG/1
1 TABLET ORAL EVERY 12 HOURS PRN
Qty: 6 TABLET | Refills: 0 | OUTPATIENT
Start: 2024-04-09 | End: 2024-04-16

## 2024-04-09 RX ORDER — HYDROCODONE BITARTRATE AND ACETAMINOPHEN 5; 325 MG/1; MG/1
1 TABLET ORAL
Status: COMPLETED | OUTPATIENT
Start: 2024-04-09 | End: 2024-04-09

## 2024-04-09 RX ORDER — METOPROLOL TARTRATE 25 MG/1
25 TABLET, FILM COATED ORAL ONCE
Status: COMPLETED | OUTPATIENT
Start: 2024-04-09 | End: 2024-04-09

## 2024-04-09 RX ORDER — CEPHALEXIN 500 MG/1
500 CAPSULE ORAL EVERY 12 HOURS
Qty: 10 CAPSULE | Refills: 0 | Status: SHIPPED | OUTPATIENT
Start: 2024-04-09 | End: 2024-04-13

## 2024-04-09 RX ADMIN — CEFTRIAXONE SODIUM 1 G: 1 INJECTION, POWDER, FOR SOLUTION INTRAMUSCULAR; INTRAVENOUS at 04:04

## 2024-04-09 RX ADMIN — IOHEXOL 75 ML: 350 INJECTION, SOLUTION INTRAVENOUS at 05:04

## 2024-04-09 RX ADMIN — HYDROCODONE BITARTRATE AND ACETAMINOPHEN 1 TABLET: 5; 325 TABLET ORAL at 02:04

## 2024-04-09 RX ADMIN — METOPROLOL TARTRATE 25 MG: 25 TABLET, FILM COATED ORAL at 06:04

## 2024-04-09 NOTE — DISCHARGE INSTRUCTIONS
Please follow up with urology as an outpatient.    Please take medications as prescribed.    Please return to the emergency department IMMEDIATELY for any new or worsening of symptoms.    Dear Mr. Cox,     Thank you for choosing [facility]! We appreciate you coming to us for your medical care. We hope you feel better soon! Please come back to [facility] for all of your future medical needs.    Our goal in the emergency department is to always give you outstanding care and exceptional service. That being said, you may receive a survey by mail or e-mail in the next week regarding your experience in our ED. We would greatly appreciate your completing and returning the survey. Your feedback provides us with a way to recognize our staff who give very good care and it helps us learn how to improve when your experience was below our aspiration of excellence.        Sincerely Yours,          John Andino MD, FACEP   Board Certified Emergency Medicine Physician   Senior Emergency Medicine Physician - [facility]   Emergency Department  [facility]

## 2024-04-09 NOTE — ED PROVIDER NOTES
"Encounter Date: 4/9/2024       History     Chief Complaint   Patient presents with    Hematuria     Brought to ED from home for hematuria x 3 days but worse today. [facility] Pt reports pain to lower abd, bilateral hips, and bilateral legs.      Patient is a 89 yo M with a pmhx of CAD, Hx of CABG, PAD, DM2, Guillain Amistad who presents to the ED via EMS with the complaint of hematuria. Pt reports gross hematuria x 3 days with associated pain to the lower abdomen, bilateral hips and legs. He denies any fever, chills, nausea, vomiting, dysuria.     Per son at bedside, pt was recently admitted to [facility] for KANCHAN. He was discharged on Saturday with a Ramirez catheter in place. Son states that soon thereafter the patient has gradually developed worsening gross hematuria.     Per chart check (Recent admission to [facility] on 4/3/24): "Pt had Ramirez placed while at [facility] for urinary retention (d/t meatal stricture). Urology recommended  keeping Ramirez catheter in x1 week then doing voiding trial outpatient, due to patient's bed/chair bound status he will need to have a tele visit set up by Urology. He does have home health nurse that could remove the Ramirez catheter pending these recommendations from Urology if needed to be done outpatient. Patient was also found to have an KANCHAN was given IV fluids and after Ramirez catheter was placed his renal function returned back to baseline.  Renal ultrasound was unremarkable. 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   Respiratory:  Negative for cough and shortness of breath.    Gastrointestinal:  Positive for abdominal pain (lower abdominal pain). Negative for nausea and vomiting.   Genitourinary:  Positive for hematuria. Negative for scrotal swelling and testicular pain.   Musculoskeletal:  Positive for back pain and myalgias.   Neurological:  Negative for dizziness and headaches.   Psychiatric/Behavioral:  Negative for agitation and confusion.        Physical Exam     Initial Vitals [04/09/24 1258]   BP Pulse Resp Temp SpO2   (!) 156/100 80 18 98.8 °F (37.1 °C) 99 %      MAP       --         Physical Exam    Nursing note and vitals reviewed.  Constitutional: He appears well-developed and well-nourished.   HENT:   Head: Normocephalic and atraumatic.   Right Ear: External ear normal.   Left Ear: External ear normal.   Pulmonary/Chest: Breath sounds normal.   Abdominal: Abdomen is soft. Bowel sounds are normal.   Non TTP to deep palpation lower abdomen/pelvis; normal bowel sounds    Genitourinary:    Genitourinary Comments: Ramirez catheter in place draining red tinged urine.      Musculoskeletal:         General: No tenderness or edema.      Comments: Wounds noted to the bilateral lower extremities; no overt SOI; these are chronic in nature.   Hips and pelvis are stable.  Pt able to range bilateral lower extremities without significant limitation.      Neurological: He is alert and oriented to person, place, and time. He has normal strength.   Skin: Skin is warm. Capillary refill takes less than 2 seconds. No erythema.   Psychiatric: He has  a normal mood and affect.         ED Course   Procedures  Labs Reviewed   CBC W/ AUTO DIFFERENTIAL - Abnormal; Notable for the following components:       Result Value    RBC 4.20 (*)     Hemoglobin 12.7 (*)     Hematocrit 37.9 (*)     Immature Granulocytes 1.1 (*)     Immature Grans (Abs) 0.10 (*)     All other components within normal limits   COMPREHENSIVE METABOLIC PANEL - Abnormal; Notable for the following components:    Glucose 215 (*)     Albumin 2.4 (*)     Alkaline Phosphatase 145 (*)     All other components within normal limits   URINALYSIS, REFLEX TO URINE CULTURE - Abnormal; Notable for the following components:    Appearance, UA Hazy (*)     Protein, UA 1+ (*)     Glucose, UA 1+ (*)     Occult Blood UA 3+ (*)     Urobilinogen, UA 2.0-3.0 (*)     Leukocytes, UA 2+ (*)     All other components within normal limits    Narrative:     Specimen Source->Urine   URINALYSIS MICROSCOPIC - Abnormal; Notable for the following components:    RBC, UA >100 (*)     WBC, UA 26 (*)     All other components within normal limits    Narrative:     Specimen Source->Urine   CULTURE, URINE   CK   PROTIME-INR          Imaging Results              CT Abdomen Pelvis With IV Contrast NO Oral Contrast (Final result)  Result time 04/09/24 17:37:49      Final result by Diamond Garza MD (04/09/24 17:37:49)                   Impression:      Thickened bladder wall, suspected blood/fluid level related to hemorrhage or debris in the bladder which contains a Ramirez catheter.  Correlate with laboratory values.    Bilateral renal cysts.    Incidental additional findings as above.      Electronically signed by: Diamond Garza  Date:    04/09/2024  Time:    17:37               Narrative:    EXAMINATION:  CT ABDOMEN PELVIS WITH IV CONTRAST    CLINICAL HISTORY:  Abdominal pain, acute, nonlocalized;Hematuria, gross/macroscopic;    TECHNIQUE:  Low dose axial images, sagittal and coronal reformations were obtained from the thoracic inlet  to the pubic symphysis following the IV administration of 75 mL of Omnipaque 350.    COMPARISON:  None.    FINDINGS:  Lung bases contain mild dependent atelectasis and otherwise clear.  No effusion.  No cardiomegaly.    Liver: Normal size and attenuation. No focal lesions.    Gallbladder: No calcified gallstones.    Bile Ducts: No dilatation.    Pancreas: No mass. No peripancreatic fat stranding.    Spleen: Normal.    Adrenals: Normal.    Kidneys/Ureters: Normal enhancement.  Bilateral renal cysts noted largest on the right appearing simple.  No calculi or hydroureteronephrosis.    Bladder: There is relative wall thickening and fluid / fluid level.  Ramirez catheter is noted within the bladder as well as air.  No calculi.    Reproductive organs: Prostate is not significantly enlarged.    GI Tract/Mesentery: Small hiatal hernia.  No evidence of bowel obstruction or inflammation.    Peritoneal Space: No ascites or free air.    Retroperitoneum: No significant adenopathy.    Abdominal wall: Fat containing inguinal hernias bilaterally.    Vasculature: No aneurysm.  Heavy atherosclerosis of the abdominal aorta and branches in the pelvis./    Bones: No acute fracture. No suspicious lytic or sclerotic lesions.  Spondylosis of the imaged spine.  No compression fractures.  Mild levoscoliosis and anterolisthesis of L3 relative to L4.  Degenerative changes of the hips.                                       Medications   HYDROcodone-acetaminophen 5-325 mg per tablet 1 tablet (1 tablet Oral Given 4/9/24 1431)   cefTRIAXone (Rocephin) 1 g in dextrose 5 % in water (D5W) 100 mL IVPB (MB+) (0 g Intravenous Stopped 4/9/24 1705)   iohexoL (OMNIPAQUE 350) injection 75 mL (75 mLs Intravenous Given 4/9/24 1711)   metoprolol tartrate (LOPRESSOR) tablet 25 mg (25 mg Oral Given 4/9/24 1828)     Medical Decision Making  Amount and/or Complexity of Data Reviewed  Labs: ordered. Decision-making details documented in ED Course.  Radiology:  ordered. Decision-making details documented in ED Course.    Risk  Prescription drug management.               ED Course as of 04/09/24 1929 Tue Apr 09, 2024   1432 Hemoglobin(!): 12.7 [LC]   1432 Hematocrit(!): 37.9 [LC]   1500 Creatinine: 1.0 [LC]   1500 Sodium: 139 [LC]   1500 PT: 12.4 [LC]   1500 INR: 1.2 [LC]   1500 CPK: 39 [LC]   1500 WBC: 9.23 [LC]   1500 Hematocrit(!): 37.9 [LC]   1500 Hemoglobin(!): 12.7 [LC]   1527 Leukocyte Esterase, UA(!): 2+ [LC]   1528 RBC, UA(!): >100 [LC]   1528 WBC, UA(!): 26 [LC]   1528 Leukocyte Esterase, UA(!): 2+ [LC]   1528 RBC, UA(!): >100 [LC]   1609 Sons at bedside updated regarding ED workup results. UA results concerning for infectious process - will initiate IV ceftriaxone in ED.  [LC]   1610 In light of pt's complaint of abdominal pain, will obtain CT A/P.  [LC]   1742 CT Abdomen Pelvis With IV Contrast NO Oral Contrast  Thickened bladder wall, suspected blood/fluid level related to hemorrhage or debris in the bladder which contains a Ramirez catheter.  Correlate with laboratory values.     Bilateral renal cysts per final radiology read.       [LC]   1751 I had a long discussion with the patient's sons regarding the patient's ED workup.  Patient is hemodynamically stable; his H&H is within normal limits; his Ramirez was flushed with very little residual hematuria noted.  The patient has no physical complaints following administration of p.o. [place].  I I gave both brothers and the son the option of admission versus discharge back to home.  They were all in agreement for discharge at this time.  The patient does have follow-up with urology in the morning.  I will discharge the patient to home per his request with a prescription for short course of antibiotics in light of his questionable findings on urinalysis.  I also gave the patient and his brother's very strict ED return precautions for any new or worsening symptoms and that we are available here 24/7 further needs  should they arise.   [LC]      ED Course User Index  [LC] John Andino MD                 Medical Decision Making:   History:   I obtained history from: someone other than patient.       <> Summary of History: Recent hospital stay discussed  with patient's son.   ED Management:  - patient presented to the ED with 3 days of hematuria from his Ramirez catheter was placed in the recent hospital admission; patient hemodynamically stable; today on CBC H&H stable; no findings of acute kidney injury compared to most recent admission to [facility]; urinalysis questionable developing infection; CT of the abdomen pelvis was obtained in light of the patient's complaint of lower abdominal pain without any acute intra-abdominal or intrapelvic abnormalities; patient administered Rocephin the ED; I gave the patient's son and the patient the option of admission versus discharge and they chose to be discharged home at this time with follow-up with urology as an outpatient.  See my work tab note for additional details regarding the discussion regarding admission versus discharge.  Patient stated to me that he is comfortable plan for discharge.  I gave both the patient and the sons at bedside strict ED return precautions for any new or worsening symptoms.  They verbalized understanding of this and expressed a willingness to comply my recommendations.  They are all comfortable plan for discharge at this time and outpatient follow up with the patient's PCP and aforementioned f/u with urology  - .- No further intervention is indicated at this time after having taken into account the patient's history, physical exam findings, and empirical and objective data obtained during the patient's emergency department workup.   - The patient is at low risk for an emergent medical condition at this time, and I am of the belief that that it is safe to discharge the patient from the emergency department.   - The patient is  instructed to follow up as outpatient as indicated on the discharge paperwork.    - I have discussed the specifics of the workup with the patient and the patient has verbalized understanding of the details of the workup, the diagnosis, the treatment plan, and the need for outpatient follow-up.    - Although the patient has no emergent etiology today this does not preclude the development of an emergent condition so, in addition, I have advised the patient that they can return to the ED and/or activate EMS at any time with worsening of their symptoms, change of their symptoms, or with any other medical complaint.    - The patient remained comfortable and stable during their visit in the ED.    - Discharge and follow-up instructions discussed with the patient who expressed understanding and willingness to comply with my recommendations.  - Results of all emergency department tests  discussed thoroughly with patient; all patient questions answered; pt in agreement with plan  - Pt instructed to follow up with PCP in 2-3 days for recheck of today's complaints  - Pt given strict emergency department return precautions for any new or worsening of symptoms  - Pt discharged from the emergency department in stable condition, in no acute distress                Clinical Impression:  Final diagnoses:  [R31.9] Hematuria, unspecified type (Primary)  [R73.9] Hyperglycemia  [M79.661, M79.662] Pain in both lower legs          ED Disposition Condition    Discharge Stable          ED Prescriptions       Medication Sig Dispense Start Date End Date Auth. Provider    cephALEXin (KEFLEX) 500 MG capsule Take 1 capsule (500 mg total) by mouth every 12 (twelve) hours. for 5 days 10 capsule 4/9/2024 4/14/2024 John Andino MD    HYDROcodone-acetaminophen (NORCO) 5-325 mg per tablet Take 1 tablet by mouth every 12 (twelve) hours as needed for Pain. 6 tablet 4/9/2024 4/12/2024 John Andino MD          Follow-up Information        Follow up With Specialties Details Why Contact Info    Seb Gray MD Internal Medicine Schedule an appointment as soon as possible for a visit   [address] [facility] [id number]  [phone]      Darci Guerra MD Urology Schedule an appointment as soon as possible for a visit   [address]  [address]  [place] [address]  [phone]               John Andino MD  04/09/24 1811

## 2024-04-09 NOTE — ED NOTES
Pt presents to the ED with complaints of hematuria and bilateral knee pain. He states that he was in the hospital 3 days ago and was discharged on Saturday with a indwelling koroma catheter. The koroma bag has bright red bloody urine in it. Pt states he has pain in his lower L abdomen that is intermittent. He also complains of numbness and tingling in his toes. Pt complains of 7/10 knee pain and describes the pain as an aching sensation. After speaking with son, we learned the pt's catheter was placed by Urology at [facility]. Pt is not ambulatory, son is at the bedside, bed is locked and at the lowest position. No further needs at this time.

## 2024-04-09 NOTE — ED NOTES
Pt resting comfortably in bed, offered a cup of water to swallow pills with. Pt tolerated well. Son is at the bedside, no further needs at this time.

## 2024-04-10 ENCOUNTER — TELEPHONE (OUTPATIENT)
Dept: INTERNAL MEDICINE | Facility: CLINIC | Age: 89
End: 2024-04-10
Payer: MEDICARE

## 2024-04-10 NOTE — ED NOTES
Updated MD on discharge bp. Pt. Is asymptomatic. Son says pt. Bp is sometimes labile and he is due medication tonight. Home medications reviewed and orders received.

## 2024-04-11 ENCOUNTER — DOCUMENT SCAN (OUTPATIENT)
Dept: HOME HEALTH SERVICES | Facility: HOSPITAL | Age: 89
End: 2024-04-11
Payer: MEDICARE

## 2024-04-11 ENCOUNTER — PATIENT MESSAGE (OUTPATIENT)
Dept: INTERNAL MEDICINE | Facility: CLINIC | Age: 89
End: 2024-04-11

## 2024-04-11 ENCOUNTER — OFFICE VISIT (OUTPATIENT)
Dept: UROLOGY | Facility: CLINIC | Age: 89
End: 2024-04-11
Payer: MEDICARE

## 2024-04-11 VITALS
HEIGHT: 67 IN | DIASTOLIC BLOOD PRESSURE: 55 MMHG | HEART RATE: 73 BPM | BODY MASS INDEX: 27.25 KG/M2 | SYSTOLIC BLOOD PRESSURE: 90 MMHG

## 2024-04-11 DIAGNOSIS — R31.9 HEMATURIA, UNSPECIFIED TYPE: ICD-10-CM

## 2024-04-11 DIAGNOSIS — R53.1 WEAKNESS: Primary | ICD-10-CM

## 2024-04-11 DIAGNOSIS — R33.9 URINARY RETENTION: Primary | ICD-10-CM

## 2024-04-11 DIAGNOSIS — R53.81 DEBILITY: ICD-10-CM

## 2024-04-11 LAB — BACTERIA UR CULT: NO GROWTH

## 2024-04-11 PROCEDURE — 99204 OFFICE O/P NEW MOD 45 MIN: CPT | Mod: S$GLB,,, | Performed by: UROLOGY

## 2024-04-11 PROCEDURE — 1100F PTFALLS ASSESS-DOCD GE2>/YR: CPT | Mod: CPTII,S$GLB,, | Performed by: UROLOGY

## 2024-04-11 PROCEDURE — 99999 PR PBB SHADOW E&M-EST. PATIENT-LVL IV: CPT | Mod: PBBFAC,,, | Performed by: UROLOGY

## 2024-04-11 PROCEDURE — 1159F MED LIST DOCD IN RCRD: CPT | Mod: CPTII,S$GLB,, | Performed by: UROLOGY

## 2024-04-11 PROCEDURE — 3288F FALL RISK ASSESSMENT DOCD: CPT | Mod: CPTII,S$GLB,, | Performed by: UROLOGY

## 2024-04-11 NOTE — PROGRESS NOTES
[facility] - [facility]rology   Clinic Note    SUBJECTIVE:     No chief complaint on file.      Referral from: John Andino MD.    History of Present Illness:  Claude T Dupuis is a 89 y.o. male who presents to clinic for gross hematuria.    Had a recent episode of pain, AMS  Brought to [facility], catheter was placed during hospitalization by a urologist  Subsequently brought to [facility] ED with hematuria, now clearing  CT with some clots in bladder, renal cysts  Family desires to be conservative  Here with sons who work in the Security Department at [employer]    Patient endorses no additional complaints at this time.    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    Hypertension     PAD (peripheral artery disease) 09/28/2022    Type 2 diabetes mellitus without complications        Past Surgical History:   Procedure Laterality Date    COLON SURGERY  2005    CORONARY ARTERY BYPASS GRAFT  2007    EYE SURGERY      VEIN BYPASS SURGERY         Family History   Problem Relation Age of Onset    Diabetes Mother        Social History     Tobacco Use    Smoking status: Never     Passive exposure: Never    Smokeless tobacco: Never   Substance Use Topics    Alcohol use: No    Drug use: No       Current Outpatient Medications on File Prior to Visit   Medication Sig Dispense Refill    aspirin 325 MG tablet Take 325 mg by mouth once daily.      atorvastatin (LIPITOR) 40 MG tablet Take 1 tablet (40 mg total) by mouth once daily. 90 tablet 3    bumetanide (BUMEX) 0.5 MG Tab Take 1 tablet (0.5 mg total) by mouth 2 (two) times a day. 180 tablet 3    cephALEXin (KEFLEX) 500 MG capsule Take 1 capsule (500 mg total) by mouth every 12 (twelve) hours. for 5 days 10 capsule 0    HYDROcodone-acetaminophen (NORCO) 5-325 mg per tablet Take 1 tablet by mouth every 12 (twelve) hours as needed for Pain. 6 tablet 0    isosorbide mononitrate (IMDUR) 30  "MG 24 hr tablet Take 1 tablet (30 mg total) by mouth once daily. 90 tablet 3    latanoprost 0.005 % ophthalmic solution Place 1 drop into both eyes every evening.      levothyroxine (SYNTHROID) 100 MCG tablet Take 1 tablet (100 mcg total) by mouth before breakfast. 30 tablet 11    metoprolol tartrate (LOPRESSOR) 100 MG tablet Take 100 mg by mouth 2 (two) times daily.      metoprolol tartrate (LOPRESSOR) 50 MG tablet Take 50 mg by mouth 2 (two) times daily.      OLANZapine (ZYPREXA) 2.5 MG tablet Take 1 tablet (2.5 mg total) by mouth every evening. 30 tablet 11    tamsulosin (FLOMAX) 0.4 mg Cap Take 2 capsules by mouth.       No current facility-administered medications on file prior to visit.       Review of patient's allergies indicates:   Allergen Reactions    Fluzone high-dose [id number] [influenza vaccine tr-s 09 (pf)]      Had tomasa ocampo       Review of Systems:  A review of 10+ systems was conducted with pertinent positive and negative findings documented in HPI with all other systems reviewed and negative.    OBJECTIVE:     Estimated body mass index is 27.25 kg/m² as calculated from the following:    Height as of 4/9/24: 5' 7" (1.702 m).    Weight as of 4/9/24: 78.9 kg (174 lb).    Vital Signs (Most Recent)  There were no vitals filed for this visit.    Physical Exam:  GENERAL: patient sitting comfortably  HEENT: normocephalic  NECK: supple, no JVD  PULM: normal chest rise, no increased WOB  HEART: non-diaphoretic  ABDO: soft, nondistended, nontender  BACK: no CVA tenderness bilaterally  SKIN: warm, dry, well perfused  EXT: no bruising or edema  NEURO: grossly normal with no focal deficits  PSYCH: appropriate mood and affect    Genitourinary Exam:  Ramirez in place, clear yellow    Lab Results   Component Value Date    BUN 12 04/09/2024    CREATININE 1.0 04/09/2024    WBC 9.23 04/09/2024    HGB 12.7 (L) 04/09/2024    HCT 37.9 (L) 04/09/2024     04/09/2024    AST 20 04/09/2024    ALT 18 04/09/2024    " ALKPHOS 145 (H) 04/09/2024    ALBUMIN 2.4 (L) 04/09/2024    HGBA1C 7.4 (H) 04/05/2024    INR 1.2 04/09/2024        Imaging:  I have personally reviewed all relevant imaging studies.    Results for orders placed or performed during the hospital encounter of 04/09/24 (from the past 2160 hour(s))   CT Abdomen Pelvis With IV Contrast NO Oral Contrast    Narrative    EXAMINATION:  CT ABDOMEN PELVIS WITH IV CONTRAST    CLINICAL HISTORY:  Abdominal pain, acute, nonlocalized;Hematuria, gross/macroscopic;    TECHNIQUE:  Low dose axial images, sagittal and coronal reformations were obtained from the thoracic inlet to the pubic symphysis following the IV administration of 75 mL of Omnipaque 350.    COMPARISON:  None.    FINDINGS:  Lung bases contain mild dependent atelectasis and otherwise clear.  No effusion.  No cardiomegaly.    Liver: Normal size and attenuation. No focal lesions.    Gallbladder: No calcified gallstones.    Bile Ducts: No dilatation.    Pancreas: No mass. No peripancreatic fat stranding.    Spleen: Normal.    Adrenals: Normal.    Kidneys/Ureters: Normal enhancement.  Bilateral renal cysts noted largest on the right appearing simple.  No calculi or hydroureteronephrosis.    Bladder: There is relative wall thickening and fluid / fluid level.  Ramirez catheter is noted within the bladder as well as air.  No calculi.    Reproductive organs: Prostate is not significantly enlarged.    GI Tract/Mesentery: Small hiatal hernia.  No evidence of bowel obstruction or inflammation.    Peritoneal Space: No ascites or free air.    Retroperitoneum: No significant adenopathy.    Abdominal wall: Fat containing inguinal hernias bilaterally.    Vasculature: No aneurysm.  Heavy atherosclerosis of the abdominal aorta and branches in the pelvis./    Bones: No acute fracture. No suspicious lytic or sclerotic lesions.  Spondylosis of the imaged spine.  No compression fractures.  Mild levoscoliosis and anterolisthesis of L3 relative  to L4.  Degenerative changes of the hips.      Impression    Thickened bladder wall, suspected blood/fluid level related to hemorrhage or debris in the bladder which contains a Ramirez catheter.  Correlate with laboratory values.    Bilateral renal cysts.    Incidental additional findings as above.      Electronically signed by: Diamond Garza  Date:    04/09/2024  Time:    17:37     No results found for this or any previous visit (from the past 2160 hour(s)).  CT Abdomen Pelvis With IV Contrast NO Oral Contrast  Narrative: EXAMINATION:  CT ABDOMEN PELVIS WITH IV CONTRAST    CLINICAL HISTORY:  Abdominal pain, acute, nonlocalized;Hematuria, gross/macroscopic;    TECHNIQUE:  Low dose axial images, sagittal and coronal reformations were obtained from the thoracic inlet to the pubic symphysis following the IV administration of 75 mL of Omnipaque 350.    COMPARISON:  None.    FINDINGS:  Lung bases contain mild dependent atelectasis and otherwise clear.  No effusion.  No cardiomegaly.    Liver: Normal size and attenuation. No focal lesions.    Gallbladder: No calcified gallstones.    Bile Ducts: No dilatation.    Pancreas: No mass. No peripancreatic fat stranding.    Spleen: Normal.    Adrenals: Normal.    Kidneys/Ureters: Normal enhancement.  Bilateral renal cysts noted largest on the right appearing simple.  No calculi or hydroureteronephrosis.    Bladder: There is relative wall thickening and fluid / fluid level.  Ramirez catheter is noted within the bladder as well as air.  No calculi.    Reproductive organs: Prostate is not significantly enlarged.    GI Tract/Mesentery: Small hiatal hernia.  No evidence of bowel obstruction or inflammation.    Peritoneal Space: No ascites or free air.    Retroperitoneum: No significant adenopathy.    Abdominal wall: Fat containing inguinal hernias bilaterally.    Vasculature: No aneurysm.  Heavy atherosclerosis of the abdominal aorta and branches in the pelvis./    Bones: No acute  "fracture. No suspicious lytic or sclerotic lesions.  Spondylosis of the imaged spine.  No compression fractures.  Mild levoscoliosis and anterolisthesis of L3 relative to L4.  Degenerative changes of the hips.  Impression: Thickened bladder wall, suspected blood/fluid level related to hemorrhage or debris in the bladder which contains a Ramirez catheter.  Correlate with laboratory values.    Bilateral renal cysts.    Incidental additional findings as above.    Electronically signed by: Diamond Garza  Date:    04/09/2024  Time:    17:37       PSA:  No results found for: "PSA", "PSADIAG", "PSATOTAL", "PSAFREE"    Testosterone:  No results found for: "TOTALTESTOST", "TESTOSTERONE"     ASSESSMENT     1. Urinary retention    2. Hematuria, unspecified type        PLAN:     Discussed options, family does not desire cysto or CTU  Continue flomax for now  Will order home health and can do a voiding trial at home.    Darci Guerra MD  Urology  [facility]    Disclaimer: This note has been generated using voice-recognition software. There may be typographical errors that have been missed during proof-reading.     "

## 2024-04-12 ENCOUNTER — PATIENT MESSAGE (OUTPATIENT)
Dept: INTERNAL MEDICINE | Facility: CLINIC | Age: 89
End: 2024-04-12
Payer: MEDICARE

## 2024-04-12 ENCOUNTER — TELEPHONE (OUTPATIENT)
Dept: INTERNAL MEDICINE | Facility: CLINIC | Age: 89
End: 2024-04-12
Payer: MEDICARE

## 2024-04-12 NOTE — TELEPHONE ENCOUNTER
----- Message from Gautam Montana sent at 4/12/2024 10:37 AM CDT -----  1MEDICALADVICE     Patient is calling for Medical Advice regarding: pt calling with insurance company on the line  about order for bedside assistance  it needs to be faxed to [facility]  fax [phone]    Phone [phone]      How long has patient had these symptoms:    Pharmacy name and phone#:    Would like response via P[employer]: call back goes pt daughter  cande brenner [phone]    Comments:

## 2024-04-13 ENCOUNTER — HOSPITAL ENCOUNTER (INPATIENT)
Facility: HOSPITAL | Age: 89
LOS: 2 days | Discharge: HOSPICE | DRG: 697 | End: 2024-04-16
Attending: EMERGENCY MEDICINE | Admitting: INTERNAL MEDICINE
Payer: MEDICARE

## 2024-04-13 ENCOUNTER — NURSE TRIAGE (OUTPATIENT)
Dept: ADMINISTRATIVE | Facility: CLINIC | Age: 89
End: 2024-04-13
Payer: MEDICARE

## 2024-04-13 ENCOUNTER — DOCUMENT SCAN (OUTPATIENT)
Dept: HOME HEALTH SERVICES | Facility: HOSPITAL | Age: 89
End: 2024-04-13
Payer: MEDICARE

## 2024-04-13 DIAGNOSIS — L97.929 VENOUS STASIS ULCERS OF BOTH LOWER EXTREMITIES: ICD-10-CM

## 2024-04-13 DIAGNOSIS — N17.9 AKI (ACUTE KIDNEY INJURY): ICD-10-CM

## 2024-04-13 DIAGNOSIS — R53.81 PHYSICAL DECONDITIONING: ICD-10-CM

## 2024-04-13 DIAGNOSIS — E11.59 CONTROLLED TYPE 2 DIABETES MELLITUS WITH OTHER CIRCULATORY COMPLICATION, WITHOUT LONG-TERM CURRENT USE OF INSULIN: ICD-10-CM

## 2024-04-13 DIAGNOSIS — R33.8 ACUTE URINARY RETENTION: ICD-10-CM

## 2024-04-13 DIAGNOSIS — N13.9 URINARY OBSTRUCTION: ICD-10-CM

## 2024-04-13 DIAGNOSIS — N30.01 ACUTE CYSTITIS WITH HEMATURIA: ICD-10-CM

## 2024-04-13 DIAGNOSIS — R33.9 RETENTION OF URINE, UNSPECIFIED: ICD-10-CM

## 2024-04-13 DIAGNOSIS — I83.029 VENOUS STASIS ULCERS OF BOTH LOWER EXTREMITIES: ICD-10-CM

## 2024-04-13 DIAGNOSIS — G47.11 IDIOPATHIC HYPERSOMNIA WITH LONG SLEEP TIME: ICD-10-CM

## 2024-04-13 DIAGNOSIS — I25.10 CORONARY ARTERY DISEASE INVOLVING NATIVE CORONARY ARTERY OF NATIVE HEART WITHOUT ANGINA PECTORIS: ICD-10-CM

## 2024-04-13 DIAGNOSIS — L97.919 VENOUS STASIS ULCERS OF BOTH LOWER EXTREMITIES: ICD-10-CM

## 2024-04-13 DIAGNOSIS — N13.9 UROPATHY, OBSTRUCTIVE: Primary | ICD-10-CM

## 2024-04-13 DIAGNOSIS — R31.9 HEMATURIA, UNSPECIFIED TYPE: ICD-10-CM

## 2024-04-13 DIAGNOSIS — I10 PRIMARY HYPERTENSION: Chronic | ICD-10-CM

## 2024-04-13 DIAGNOSIS — R62.7 FAILURE TO THRIVE IN ADULT: ICD-10-CM

## 2024-04-13 DIAGNOSIS — I83.019 VENOUS STASIS ULCERS OF BOTH LOWER EXTREMITIES: ICD-10-CM

## 2024-04-13 DIAGNOSIS — E87.1 HYPONATREMIA: ICD-10-CM

## 2024-04-13 DIAGNOSIS — I73.9 PAD (PERIPHERAL ARTERY DISEASE): Chronic | ICD-10-CM

## 2024-04-13 PROBLEM — E03.9 HYPOTHYROID: Status: ACTIVE | Noted: 2022-12-16

## 2024-04-13 PROBLEM — E78.5 HLD (HYPERLIPIDEMIA): Status: ACTIVE | Noted: 2024-04-13

## 2024-04-13 LAB
ALBUMIN SERPL BCP-MCNC: 2.4 G/DL (ref 3.5–5.2)
ALP SERPL-CCNC: 173 U/L (ref 55–135)
ALT SERPL W/O P-5'-P-CCNC: 27 U/L (ref 10–44)
ANION GAP SERPL CALC-SCNC: 14 MMOL/L (ref 8–16)
AST SERPL-CCNC: 34 U/L (ref 10–40)
BACTERIA #/AREA URNS HPF: ABNORMAL /HPF
BASOPHILS # BLD AUTO: 0.04 K/UL (ref 0–0.2)
BASOPHILS NFR BLD: 0.3 % (ref 0–1.9)
BILIRUB SERPL-MCNC: 1 MG/DL (ref 0.1–1)
BILIRUB UR QL STRIP: NEGATIVE
BUN SERPL-MCNC: 29 MG/DL (ref 10–30)
CALCIUM SERPL-MCNC: 9 MG/DL (ref 8.7–10.5)
CHLORIDE SERPL-SCNC: 97 MMOL/L (ref 95–110)
CLARITY UR: ABNORMAL
CO2 SERPL-SCNC: 22 MMOL/L (ref 23–29)
COLOR UR: ABNORMAL
CREAT SERPL-MCNC: 2.1 MG/DL (ref 0.5–1.4)
DIFFERENTIAL METHOD BLD: ABNORMAL
EOSINOPHIL # BLD AUTO: 0.1 K/UL (ref 0–0.5)
EOSINOPHIL NFR BLD: 0.5 % (ref 0–8)
ERYTHROCYTE [DISTWIDTH] IN BLOOD BY AUTOMATED COUNT: 14.4 % (ref 11.5–14.5)
EST. GFR  (NO RACE VARIABLE): 28 ML/MIN/1.73 M^2
GLUCOSE SERPL-MCNC: 175 MG/DL (ref 70–110)
GLUCOSE UR QL STRIP: ABNORMAL
HCT VFR BLD AUTO: 39 % (ref 40–54)
HGB BLD-MCNC: 13.6 G/DL (ref 14–18)
HGB UR QL STRIP: ABNORMAL
HYALINE CASTS #/AREA URNS LPF: 0 /LPF
IMM GRANULOCYTES # BLD AUTO: 0.09 K/UL (ref 0–0.04)
IMM GRANULOCYTES NFR BLD AUTO: 0.6 % (ref 0–0.5)
KETONES UR QL STRIP: NEGATIVE
LEUKOCYTE ESTERASE UR QL STRIP: ABNORMAL
LYMPHOCYTES # BLD AUTO: 1.7 K/UL (ref 1–4.8)
LYMPHOCYTES NFR BLD: 10.9 % (ref 18–48)
MCH RBC QN AUTO: 30.4 PG (ref 27–31)
MCHC RBC AUTO-ENTMCNC: 34.9 G/DL (ref 32–36)
MCV RBC AUTO: 87 FL (ref 82–98)
MICROSCOPIC COMMENT: ABNORMAL
MONOCYTES # BLD AUTO: 1.2 K/UL (ref 0.3–1)
MONOCYTES NFR BLD: 7.9 % (ref 4–15)
NEUTROPHILS # BLD AUTO: 12.3 K/UL (ref 1.8–7.7)
NEUTROPHILS NFR BLD: 79.8 % (ref 38–73)
NITRITE UR QL STRIP: NEGATIVE
NRBC BLD-RTO: 0 /100 WBC
PH UR STRIP: 6 [PH] (ref 5–8)
PLATELET # BLD AUTO: 315 K/UL (ref 150–450)
PMV BLD AUTO: 10.4 FL (ref 9.2–12.9)
POTASSIUM SERPL-SCNC: 4.4 MMOL/L (ref 3.5–5.1)
PROT SERPL-MCNC: 6.7 G/DL (ref 6–8.4)
PROT UR QL STRIP: ABNORMAL
RBC # BLD AUTO: 4.47 M/UL (ref 4.6–6.2)
RBC #/AREA URNS HPF: >100 /HPF (ref 0–4)
SODIUM SERPL-SCNC: 133 MMOL/L (ref 136–145)
SP GR UR STRIP: 1.01 (ref 1–1.03)
URN SPEC COLLECT METH UR: ABNORMAL
UROBILINOGEN UR STRIP-ACNC: NEGATIVE EU/DL
WBC # BLD AUTO: 15.35 K/UL (ref 3.9–12.7)
WBC #/AREA URNS HPF: >100 /HPF (ref 0–5)

## 2024-04-13 PROCEDURE — 99285 EMERGENCY DEPT VISIT HI MDM: CPT | Mod: 25

## 2024-04-13 PROCEDURE — 96361 HYDRATE IV INFUSION ADD-ON: CPT

## 2024-04-13 PROCEDURE — G0378 HOSPITAL OBSERVATION PER HR: HCPCS

## 2024-04-13 PROCEDURE — 80053 COMPREHEN METABOLIC PANEL: CPT | Performed by: EMERGENCY MEDICINE

## 2024-04-13 PROCEDURE — 51798 US URINE CAPACITY MEASURE: CPT

## 2024-04-13 PROCEDURE — 84300 ASSAY OF URINE SODIUM: CPT | Performed by: STUDENT IN AN ORGANIZED HEALTH CARE EDUCATION/TRAINING PROGRAM

## 2024-04-13 PROCEDURE — 81000 URINALYSIS NONAUTO W/SCOPE: CPT | Performed by: EMERGENCY MEDICINE

## 2024-04-13 PROCEDURE — 85025 COMPLETE CBC W/AUTO DIFF WBC: CPT | Performed by: EMERGENCY MEDICINE

## 2024-04-13 PROCEDURE — 96365 THER/PROPH/DIAG IV INF INIT: CPT

## 2024-04-13 PROCEDURE — 84540 ASSAY OF URINE/UREA-N: CPT | Performed by: STUDENT IN AN ORGANIZED HEALTH CARE EDUCATION/TRAINING PROGRAM

## 2024-04-13 PROCEDURE — 25000003 PHARM REV CODE 250: Performed by: EMERGENCY MEDICINE

## 2024-04-13 PROCEDURE — 87086 URINE CULTURE/COLONY COUNT: CPT | Performed by: EMERGENCY MEDICINE

## 2024-04-13 PROCEDURE — 82570 ASSAY OF URINE CREATININE: CPT | Performed by: STUDENT IN AN ORGANIZED HEALTH CARE EDUCATION/TRAINING PROGRAM

## 2024-04-13 PROCEDURE — 63600175 PHARM REV CODE 636 W HCPCS: Performed by: EMERGENCY MEDICINE

## 2024-04-13 RX ORDER — GLUCAGON 1 MG
1 KIT INJECTION
Status: DISCONTINUED | OUTPATIENT
Start: 2024-04-14 | End: 2024-04-16 | Stop reason: HOSPADM

## 2024-04-13 RX ORDER — ENOXAPARIN SODIUM 100 MG/ML
40 INJECTION SUBCUTANEOUS EVERY 24 HOURS
Status: DISCONTINUED | OUTPATIENT
Start: 2024-04-14 | End: 2024-04-13

## 2024-04-13 RX ORDER — TAMSULOSIN HYDROCHLORIDE 0.4 MG/1
2 CAPSULE ORAL DAILY
Status: DISCONTINUED | OUTPATIENT
Start: 2024-04-14 | End: 2024-04-16 | Stop reason: HOSPADM

## 2024-04-13 RX ORDER — IBUPROFEN 200 MG
16 TABLET ORAL
Status: DISCONTINUED | OUTPATIENT
Start: 2024-04-14 | End: 2024-04-16 | Stop reason: HOSPADM

## 2024-04-13 RX ORDER — SODIUM CHLORIDE, SODIUM LACTATE, POTASSIUM CHLORIDE, CALCIUM CHLORIDE 600; 310; 30; 20 MG/100ML; MG/100ML; MG/100ML; MG/100ML
INJECTION, SOLUTION INTRAVENOUS CONTINUOUS
Status: ACTIVE | OUTPATIENT
Start: 2024-04-14 | End: 2024-04-15

## 2024-04-13 RX ORDER — NALOXONE HCL 0.4 MG/ML
0.02 VIAL (ML) INJECTION
Status: DISCONTINUED | OUTPATIENT
Start: 2024-04-13 | End: 2024-04-16 | Stop reason: HOSPADM

## 2024-04-13 RX ORDER — SODIUM CHLORIDE 0.9 % (FLUSH) 0.9 %
10 SYRINGE (ML) INJECTION EVERY 12 HOURS PRN
Status: DISCONTINUED | OUTPATIENT
Start: 2024-04-13 | End: 2024-04-16 | Stop reason: HOSPADM

## 2024-04-13 RX ORDER — LEVOTHYROXINE SODIUM 100 UG/1
100 TABLET ORAL
Status: DISCONTINUED | OUTPATIENT
Start: 2024-04-14 | End: 2024-04-16 | Stop reason: HOSPADM

## 2024-04-13 RX ORDER — IBUPROFEN 200 MG
24 TABLET ORAL
Status: DISCONTINUED | OUTPATIENT
Start: 2024-04-14 | End: 2024-04-16 | Stop reason: HOSPADM

## 2024-04-13 RX ORDER — IBUPROFEN 200 MG
16 TABLET ORAL
Status: DISCONTINUED | OUTPATIENT
Start: 2024-04-13 | End: 2024-04-16

## 2024-04-13 RX ORDER — ONDANSETRON 4 MG/1
4 TABLET, ORALLY DISINTEGRATING ORAL
Status: COMPLETED | OUTPATIENT
Start: 2024-04-13 | End: 2024-04-13

## 2024-04-13 RX ORDER — INSULIN ASPART 100 [IU]/ML
0-5 INJECTION, SOLUTION INTRAVENOUS; SUBCUTANEOUS
Status: DISCONTINUED | OUTPATIENT
Start: 2024-04-14 | End: 2024-04-16 | Stop reason: HOSPADM

## 2024-04-13 RX ORDER — ENOXAPARIN SODIUM 100 MG/ML
30 INJECTION SUBCUTANEOUS EVERY 24 HOURS
Status: DISCONTINUED | OUTPATIENT
Start: 2024-04-14 | End: 2024-04-15

## 2024-04-13 RX ORDER — MORPHINE SULFATE 2 MG/ML
2 INJECTION, SOLUTION INTRAMUSCULAR; INTRAVENOUS
Status: DISCONTINUED | OUTPATIENT
Start: 2024-04-13 | End: 2024-04-14

## 2024-04-13 RX ORDER — IBUPROFEN 200 MG
24 TABLET ORAL
Status: DISCONTINUED | OUTPATIENT
Start: 2024-04-13 | End: 2024-04-16

## 2024-04-13 RX ORDER — METOPROLOL TARTRATE 50 MG/1
50 TABLET ORAL 2 TIMES DAILY
Status: DISCONTINUED | OUTPATIENT
Start: 2024-04-14 | End: 2024-04-16 | Stop reason: HOSPADM

## 2024-04-13 RX ORDER — GLUCAGON 1 MG
1 KIT INJECTION
Status: DISCONTINUED | OUTPATIENT
Start: 2024-04-13 | End: 2024-04-16

## 2024-04-13 RX ORDER — ISOSORBIDE MONONITRATE 30 MG/1
30 TABLET, EXTENDED RELEASE ORAL DAILY
Status: DISCONTINUED | OUTPATIENT
Start: 2024-04-14 | End: 2024-04-16 | Stop reason: HOSPADM

## 2024-04-13 RX ADMIN — ONDANSETRON 4 MG: 4 TABLET, ORALLY DISINTEGRATING ORAL at 09:04

## 2024-04-13 RX ADMIN — SODIUM CHLORIDE 500 ML: 9 INJECTION, SOLUTION INTRAVENOUS at 09:04

## 2024-04-13 RX ADMIN — CEFTRIAXONE SODIUM 2 G: 2 INJECTION, POWDER, FOR SOLUTION INTRAMUSCULAR; INTRAVENOUS at 10:04

## 2024-04-13 NOTE — TELEPHONE ENCOUNTER
Petty with [facility] states pt has been having dark bloody urine all week. Went to ED during wk and told not a UTI, passed clots and pain went away. Coude catheter placed at this time.   Saw MD Guerra on Thursday.    nurse saw him Friday as well.   Daughter called this AM and said pt continues to have pain.  states she called and had order placed for a UA as stat, but not in system. Called back and was told that the MD would not see the request until Monday.    just received another call from the pt's son that pt again  experiencing abdominal pain, bloody urine.   Caller states she called us before calling the pt's family back to check on severity of symptoms. Care advice provided per protocol and provided symptoms.  nurse VU, stating she will call her supervisor and will obtain further recommendation.    Reason for Disposition   SEVERE abdominal pain    Additional Information   Negative: Shock suspected (e.g., cold/pale/clammy skin, too weak to stand, low BP, rapid pulse)   Negative: Sounds like a life-threatening emergency to the triager   Negative: [1] Catheter was accidentally pulled-out AND [2] bright red continuous bleeding (or trickling)    Protocols used: Urinary Catheter (e.g., Ramirez) Symptoms and [id number]

## 2024-04-14 PROBLEM — R53.81 PHYSICAL DECONDITIONING: Status: ACTIVE | Noted: 2024-04-14

## 2024-04-14 PROBLEM — E87.1 HYPONATREMIA: Status: ACTIVE | Noted: 2024-04-14

## 2024-04-14 PROBLEM — R62.7 FAILURE TO THRIVE IN ADULT: Status: ACTIVE | Noted: 2024-04-14

## 2024-04-14 PROBLEM — R33.8 ACUTE URINARY RETENTION: Status: ACTIVE | Noted: 2024-04-14

## 2024-04-14 LAB
ALBUMIN SERPL BCP-MCNC: 2.2 G/DL (ref 3.5–5.2)
ALP SERPL-CCNC: 153 U/L (ref 55–135)
ALT SERPL W/O P-5'-P-CCNC: 22 U/L (ref 10–44)
ANION GAP SERPL CALC-SCNC: 10 MMOL/L (ref 8–16)
AST SERPL-CCNC: 27 U/L (ref 10–40)
BASOPHILS # BLD AUTO: 0.04 K/UL (ref 0–0.2)
BASOPHILS NFR BLD: 0.3 % (ref 0–1.9)
BILIRUB SERPL-MCNC: 0.6 MG/DL (ref 0.1–1)
BUN SERPL-MCNC: 27 MG/DL (ref 10–30)
CALCIUM SERPL-MCNC: 8.8 MG/DL (ref 8.7–10.5)
CHLORIDE SERPL-SCNC: 102 MMOL/L (ref 95–110)
CO2 SERPL-SCNC: 26 MMOL/L (ref 23–29)
CREAT SERPL-MCNC: 1.7 MG/DL (ref 0.5–1.4)
CREAT UR-MCNC: 65 MG/DL (ref 23–375)
DIFFERENTIAL METHOD BLD: ABNORMAL
EOSINOPHIL # BLD AUTO: 0 K/UL (ref 0–0.5)
EOSINOPHIL NFR BLD: 0.3 % (ref 0–8)
ERYTHROCYTE [DISTWIDTH] IN BLOOD BY AUTOMATED COUNT: 14.2 % (ref 11.5–14.5)
EST. GFR  (NO RACE VARIABLE): 37 ML/MIN/1.73 M^2
GLUCOSE SERPL-MCNC: 188 MG/DL (ref 70–110)
HCT VFR BLD AUTO: 39.6 % (ref 40–54)
HGB BLD-MCNC: 13.1 G/DL (ref 14–18)
IMM GRANULOCYTES # BLD AUTO: 0.09 K/UL (ref 0–0.04)
IMM GRANULOCYTES NFR BLD AUTO: 0.6 % (ref 0–0.5)
LYMPHOCYTES # BLD AUTO: 1.5 K/UL (ref 1–4.8)
LYMPHOCYTES NFR BLD: 9.8 % (ref 18–48)
MAGNESIUM SERPL-MCNC: 1.8 MG/DL (ref 1.6–2.6)
MCH RBC QN AUTO: 29.6 PG (ref 27–31)
MCHC RBC AUTO-ENTMCNC: 33.1 G/DL (ref 32–36)
MCV RBC AUTO: 90 FL (ref 82–98)
MONOCYTES # BLD AUTO: 1 K/UL (ref 0.3–1)
MONOCYTES NFR BLD: 6.7 % (ref 4–15)
NEUTROPHILS # BLD AUTO: 12.4 K/UL (ref 1.8–7.7)
NEUTROPHILS NFR BLD: 82.3 % (ref 38–73)
NRBC BLD-RTO: 0 /100 WBC
PHOSPHATE SERPL-MCNC: 3.5 MG/DL (ref 2.7–4.5)
PLATELET # BLD AUTO: 333 K/UL (ref 150–450)
PMV BLD AUTO: 9.7 FL (ref 9.2–12.9)
POCT GLUCOSE: 134 MG/DL (ref 70–110)
POCT GLUCOSE: 152 MG/DL (ref 70–110)
POCT GLUCOSE: 163 MG/DL (ref 70–110)
POCT GLUCOSE: 187 MG/DL (ref 70–110)
POTASSIUM SERPL-SCNC: 4.2 MMOL/L (ref 3.5–5.1)
PROT SERPL-MCNC: 6.3 G/DL (ref 6–8.4)
PROT UR-MCNC: 267 MG/DL (ref 0–15)
PROT/CREAT UR: 4.11 MG/G{CREAT} (ref 0–0.2)
RBC # BLD AUTO: 4.42 M/UL (ref 4.6–6.2)
SODIUM SERPL-SCNC: 138 MMOL/L (ref 136–145)
SODIUM UR-SCNC: 42 MMOL/L (ref 20–250)
UUN UR-MCNC: 390 MG/DL (ref 140–1050)
WBC # BLD AUTO: 15.09 K/UL (ref 3.9–12.7)

## 2024-04-14 PROCEDURE — 36415 COLL VENOUS BLD VENIPUNCTURE: CPT | Performed by: STUDENT IN AN ORGANIZED HEALTH CARE EDUCATION/TRAINING PROGRAM

## 2024-04-14 PROCEDURE — 97163 PT EVAL HIGH COMPLEX 45 MIN: CPT

## 2024-04-14 PROCEDURE — 96361 HYDRATE IV INFUSION ADD-ON: CPT

## 2024-04-14 PROCEDURE — 97530 THERAPEUTIC ACTIVITIES: CPT

## 2024-04-14 PROCEDURE — 25000003 PHARM REV CODE 250: Performed by: STUDENT IN AN ORGANIZED HEALTH CARE EDUCATION/TRAINING PROGRAM

## 2024-04-14 PROCEDURE — 97110 THERAPEUTIC EXERCISES: CPT

## 2024-04-14 PROCEDURE — 63600175 PHARM REV CODE 636 W HCPCS: Performed by: INTERNAL MEDICINE

## 2024-04-14 PROCEDURE — 85025 COMPLETE CBC W/AUTO DIFF WBC: CPT | Performed by: STUDENT IN AN ORGANIZED HEALTH CARE EDUCATION/TRAINING PROGRAM

## 2024-04-14 PROCEDURE — 97535 SELF CARE MNGMENT TRAINING: CPT

## 2024-04-14 PROCEDURE — 97166 OT EVAL MOD COMPLEX 45 MIN: CPT

## 2024-04-14 PROCEDURE — 63600175 PHARM REV CODE 636 W HCPCS: Performed by: STUDENT IN AN ORGANIZED HEALTH CARE EDUCATION/TRAINING PROGRAM

## 2024-04-14 PROCEDURE — 11000001 HC ACUTE MED/SURG PRIVATE ROOM

## 2024-04-14 PROCEDURE — 83735 ASSAY OF MAGNESIUM: CPT | Performed by: STUDENT IN AN ORGANIZED HEALTH CARE EDUCATION/TRAINING PROGRAM

## 2024-04-14 PROCEDURE — 25000003 PHARM REV CODE 250

## 2024-04-14 PROCEDURE — 84100 ASSAY OF PHOSPHORUS: CPT | Performed by: STUDENT IN AN ORGANIZED HEALTH CARE EDUCATION/TRAINING PROGRAM

## 2024-04-14 PROCEDURE — 80053 COMPREHEN METABOLIC PANEL: CPT | Performed by: STUDENT IN AN ORGANIZED HEALTH CARE EDUCATION/TRAINING PROGRAM

## 2024-04-14 RX ORDER — POLYETHYLENE GLYCOL 3350 17 G/17G
17 POWDER, FOR SOLUTION ORAL 2 TIMES DAILY
Status: DISCONTINUED | OUTPATIENT
Start: 2024-04-14 | End: 2024-04-16 | Stop reason: HOSPADM

## 2024-04-14 RX ADMIN — METOPROLOL TARTRATE 50 MG: 50 TABLET, FILM COATED ORAL at 09:04

## 2024-04-14 RX ADMIN — SODIUM CHLORIDE, POTASSIUM CHLORIDE, SODIUM LACTATE AND CALCIUM CHLORIDE: 600; 310; 30; 20 INJECTION, SOLUTION INTRAVENOUS at 02:04

## 2024-04-14 RX ADMIN — POLYETHYLENE GLYCOL 3350 17 G: 17 POWDER, FOR SOLUTION ORAL at 09:04

## 2024-04-14 RX ADMIN — POLYETHYLENE GLYCOL 3350 17 G: 17 POWDER, FOR SOLUTION ORAL at 11:04

## 2024-04-14 RX ADMIN — LEVOTHYROXINE SODIUM 100 MCG: 100 TABLET ORAL at 06:04

## 2024-04-14 RX ADMIN — ENOXAPARIN SODIUM 30 MG: 30 INJECTION SUBCUTANEOUS at 04:04

## 2024-04-14 RX ADMIN — ISOSORBIDE MONONITRATE 30 MG: 30 TABLET, EXTENDED RELEASE ORAL at 09:04

## 2024-04-14 RX ADMIN — TAMSULOSIN HYDROCHLORIDE 0.8 MG: 0.4 CAPSULE ORAL at 09:04

## 2024-04-14 NOTE — PROGRESS NOTES
Pharmacist Renal Dose Adjustment Note    Claude T Dupuis is a 89 y.o. male being treated with the medication enoxaparin    Patient Data:    Vital Signs (Most Recent):  Temp: 98.6 °F (37 °C) (04/13/24 2306)  Pulse: 94 (04/13/24 2306)  Resp: 16 (04/13/24 2015)  BP: (!) 149/74 (04/13/24 2306)  SpO2: 96 % (04/13/24 2306) Vital Signs (72h Range):  Temp:  [98.4 °F (36.9 °C)-98.6 °F (37 °C)]   Pulse:  [92-94]   Resp:  [16]   BP: (149-166)/(72-74)   SpO2:  [95 %-96 %]      Recent Labs   Lab 04/09/24  1416 04/13/24 2132   CREATININE 1.0 2.1*     Serum creatinine: 2.1 mg/dL (H) 04/13/24 2132  Estimated creatinine clearance: 21.5 mL/min (A)    Medication:enoxaparin dose: 40mg frequency daily will be changed to medication:enoxaparin dose:30mg frequency:daily    Pharmacist's Name: Hayden Ashley  Pharmacist's Extension: [phone]

## 2024-04-14 NOTE — PT/OT/SLP EVAL
Physical Therapy Evaluation    Patient Name:  Claude T Dupuis   MRN:  [id number]    Recommendations:     Discharge Recommendations:  (24/7 assistance, recommend palliative care recommendation)   Discharge Equipment Recommendations: hospital bed, lift device   Barriers to discharge:  increased caregiver demands, pt's family currently lifting wheelchair up 2 steps to enter home    Assessment:     Claude T Dupuis is a 89 y.o. male admitted with a medical diagnosis of KANCHAN (acute kidney injury). PMH includes dementia, CAD, CABG 2007, HTN, HLD, PAD, venous stasis ulcers.  Pt admitted on 4/4/24 and koroma catheter was placed due to urinary retention. He presents with the following impairments/functional limitations:   impaired cognition, bilateral lower extremity pain, decreased bilateral lower extremity strength, decreased endurance, decreased range of motion, decreased coordination, impaired mobility, impaired balance and decreased ambulation.  As per patient's son, patient has been non-ambulatory for past three weeks, family is lifting patient of of bed to place on commode or in wheelchair and PO intake has declined as well in past 2-3 weeks.  Pt was most recently ambulatory in the home 3 months ago but son reports increased incidence of falls, 2-3 falls in the past month.  Pt evaluated with OT due to complexity of care and increased need for assistance.  Pt cooperative, able to follow one step commands, reports pain in bilateral lower extremities, especially bilateral toes.  Pt required assist of 2 therapists for supine <> sit transfers, sit to stand transfer not attempted due to bilateral foot pain and safety concerns.  Pt unable to scoot to head of bed, required max A of 2 to reposition in bed once returned to supine position.    Pt would benefit from palliative care consult, 24/7 assistance and hospital bed and lift device in home.    Rehab Prognosis: Fair; patient would benefit from acute skilled PT services to address  these deficits and reach maximum level of function.    Recent Surgery: * No surgery found *      Plan:     During this hospitalization, patient to be seen 3 x/week to address the identified rehab impairments via therapeutic activities, therapeutic exercises, gait training, neuromuscular re-education and progress toward the following goals:    Plan of Care Expires:  05/12/24    Subjective     Chief Complaint: pain in buttocks and bilateral lower extremity / feet  Patient/Family Comments/goals: Pt's son open to palliative care consultation, appreciative of assistance for setting up increased support at home.  Pain/Comfort:  Pain Rating 1: 0/10  Location - Side 1: Bilateral  Location - Orientation 1: lower  Location 1: leg  Pain Addressed 1: Reposition    Patients cultural, spiritual, Restoration conflicts given the current situation:      Living Environment:  Pt lives with 94 year old wife in single story home, 2 CAITLIN with incline and additional single step to living area.  Family recently obtained bedside commode, pt has rolling walker and rollator, transfer wheelchair and tub transfer bench.  Bathroom is not accessible by wheelchair.   Prior to admission, patients level of function was max assistance, dependent on children.  Equipment used at home: commode, rollator, walker, rolling, wheelchair.  DME owned (not currently used): transfer tub bench.  Upon discharge, patient will have assistance from family.    Objective:     Communicated with nurse, Maria D,  prior to session.  Patient found HOB elevated with bed alarm, koroma catheter, peripheral IV  upon PT entry to room.    General Precautions: Standard, fall, hearing impaired  Orthopedic Precautions:N/A   Braces:    Respiratory Status: Room air    Exams:  Cognitive Exam:  Patient is oriented to Person  Sensation:    -       Intact  Skin Integrity/Edema:      -       Skin integrity: Dry and shiny skin lower legs and feet.  Stasis ulcers on right first and second toe.   Fluid filled blister on anterior aspect of right shin  RLE ROM: ankle dorsiflexion to neutral, knee WFL  RLE Strength: fair minus, pt unable to follow directions for MMT  LLE ROM: ankle dorsiflexion to neutral, knee WFL   LLE Strength: grossly fair minus, unable to follow commands for MMT    Functional Mobility:  Bed Mobility:     Rolling Left:  moderate assistance and of 2 persons  Rolling Right: maximal assistance and of 2 persons  Scooting: total assistance  Supine to Sit: moderate assistance and of 2 persons  Sit to Supine: moderate assistance and of 2 persons      AM-PAC 6 CLICK MOBILITY  Total Score:7       Treatment & Education:   PT educated patient/family:  PT plan of care/role of PT  Dicussed palliative care consult   Discharge disposition, services and recommended equipment  Pt's son verbalized understanding   There Ex: performed bilaterally supine ankle pumps x 10, heel slides x 10, hip abduction/adduction x 5, straight leg raise x 5, seated long arc quad x 8 reps      Patient left HOB elevated with all lines intact, call button in reach, bed alarm on, and son present.  Pressure relief boots donned to bilateral feet.    GOALS:   Multidisciplinary Problems       Physical Therapy Goals          Problem: Physical Therapy    Goal Priority Disciplines Outcome Goal Variances Interventions   Physical Therapy Goal     PT, PT/OT      Description: Physical therapy goals to be met by 5/12/2024:  1. Pt will complete bed mobility with min assist from caregivers.  2. Pt will complete supine <>sit transfers with min assist from caregivers.  3. Pt will complete sit to stand transfer with mod A with rolling walker.  4. Pt will complete stand pivot transfer from bed to wheelchair or bedside chair with mod A x 2 with rolling walker.                       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

## 2024-04-14 NOTE — H&P
[facility] Hospital Medicine H&P Note     Admitting Team: [facility] Hospitalist Team A  Attending Physician: Lety Heath MD  Resident: Dr. Velez    Date of Admit: 4/13/2024    Chief Complaint     Stomach pain x 1 day    Subjective:      History of Present Illness:  Claude T Dupuis is a 89 y.o. male with past medical history of CAD s/p CABG 2007, dementia, HTN, HLD, PAD, venous stasis ulcers, and penile meatus stricture who presented on 4/13/2024 for abdominal pain x 1 day.    Of note, patient with dementia and poor memory. HPI primarily attained through son at bedside and EMR review.    The patient was in their usual state of health which includes living with family, bed/wheelchair bound, able to feed self, but otherwise dependent on ADLs. Pt recently admitted on 4/4/2023 for acute urinary retention 2/2 meatal stricture and KANCHAN. Kanchan improved and patient discharge with koroma and plans to perform voiding trial through home health. Family noted poor urine output of koroma for last 24hrs and pt began experiencing severe suprapubic pain prompting family to bring patient to ED. Found to have KANCHAN and active urinary retention in ED. Staff able to manipulate koroma and pt now passing urine with relief of pain. U Medicine consulted for admission due to post-obstructive KANCHAN.    On exam, pt comfortable appearing without focal complaints. Denies any associated fevers, chills, nausea, or vomiting.      Past Medical History:  Past Medical History:   Diagnosis Date    Cancer     Colon cancer     Coronary artery disease     Guillain-[place] syndrome     Hx of CABG 09/07/2022    Hypertension     PAD (peripheral artery disease) 09/28/2022    Type 2 diabetes mellitus without complications        Past Surgical History:  Past Surgical History:   Procedure Laterality Date    COLON SURGERY  2005    CORONARY ARTERY BYPASS GRAFT  2007    EYE SURGERY      VEIN BYPASS SURGERY         Allergies:  Review of patient's allergies indicates:   Allergen  Reactions    Fluzone high-dose [id number] [influenza vaccine tr-s 09 (pf)]      Had guillane barre       Home Medications:  Medications reconciled with son at bedside    Imdur 30mg QD  Synthroid 100mcg QD  Flomax 0.4mg QD  Lopressor 50 mg BID  ASA 81mg    Family History:    Family History   Problem Relation Name Age of Onset    Diabetes Mother Radha Cox        Social History:  Alcohol use: None  Tobacco use: None  Recreational drug use: none  Occupation: Retired bakery owner  Current living situation: with family    Review of Systems:  A comprehensive review of systems was negative unless otherwise stated in the HPI.    Health Maintaince :   Primary Care Physician:  Seb Gray MD     Immunizations:   Currently on File:   Most Recent Immunizations   Administered Date(s) Administered    Pneumococcal Conjugate - 20 Valent 12/16/2022    Tdap 10/13/2016        Objective       Physical Examination:    BP (!) 149/74 (BP Location: Right arm, Patient Position: Lying)   Pulse 94   Temp 98.6 °F (37 °C) (Oral)   Resp 16   SpO2 96%      General: A&Ox3, resting comfortably in bed, poor memory, unable to recall recent events  HEENT: EOMI, moist mucus membranes w/ no erythema noted, no facial asymmetry noted  Neck: Trachea midline, no masses, no lymphadenopathy  Cardiovascular: Regular rate and rhythm, normal S1 and S2, no murmurs, rubs or gallops  Pulm: CTAB, no wheezes or crackles; no respiratory distress  Abdomen: Soft, non-tender, non-distended; no organomegaly  : Ramirez in place with active urine flow  Skin: several ulcers on lower extremeties, tense bullae on R shin  Extremities: BLE ulcers, not infected appearing            Pulses: 1+ LE pulses  Neurological: A&Ox3, 5/5 upper and lower extremity strength, face midline, smile symmetric  Psychiatric: Normal mood and affect, thought content normal    Laboratory:  Recent Labs   Lab 04/09/24  1416 04/13/24  2105 04/13/24  2132   WBC 9.23 15.35*  --    HGB 12.7* 13.6*  --      315  --    MCV 90 87  --      --  133*   K 3.8  --  4.4     --  97   CO2 28  --  22*   BUN 12  --  29   CREATININE 1.0  --  2.1*   *  --  175*   CALCIUM 8.8  --  9.0   PROT 6.0  --  6.7   ALBUMIN 2.4*  --  2.4*   AST 20  --  34   ALT 18  --  27   ALKPHOS 145*  --  173*       All laboratory data reviewed    Microbiology Data: Ucx pending    EKG Data: None    Radiology Data: CXR pending     Assessment/Plan     Claude T Dupuis is a 89 y.o. male with past medical history of CAD s/p CABG 2007, dementia, HTN, HLD, PAD, venous stasis ulcers, and penile meatus stricture who presented on 4/13/2024 for abdominal pain x 1 day. Patient is admitted to [facility] for post-obstructive KANCHAN 2/2 acute urinary retention from koroma malfunction.     Acute Urinary Retention  Recently discharged with indwelling koroma on 4/4/2024 secondary to acute urinary retention from meatal stricture. Required urology for placement, followed up with Dr. uGerra with plans for voiding trial with home health. Koroma manipulated by ED staff, now flowing. -No urology coverage today, consult in AM (Dr. Guerra available then)  -Gentle hydration with IVF, monitor for post-obstructive diuresis  -Urology consult tomorrow AM.    KANCHAN, likely post-obstructive  Hematuria  Pyuria/Bacteruria  Admit Cr 2.1, elevated from 1 four days prior. UA with above findings. Pt's symptoms including elevated WBC likely stemming from acute stress response to retention. Pt currently without any pain.  -CTM, low threshold to start abx if fevers or WBC worsens  -Urine studies ordered    Failure to thrive  Low Albumin  Cognitive Decline  Per Son, patient has had a notable physical and mental decline within the last month. Was ambulating with a walker roughly a month ago however now is bound to wheelchair 2/2 to knee pain and generalized fatigue. Notes his episodes of confusion have been increasing and he requires frequent re-orientation. Appears to have  a >1month decline across several domains. Son is concerned that family is unable to provide the level of care he requires  -PT/OT ordered for functional assessment  -Discussions with case management for placement options  -Consider palliative consult pending discussions with rest of family during day    Chronic LE Ulcers  Hx of chronic LE ulcers from PAD and venous insufficiency. Toe pressures in 2022 with severe arterial disease. Has frequent wound care, non appear infected  -wound care while inpatient.    Hypothyroidism  -TSH 10.46 seven days prior, questionable utility due drawn during acute healthcare event  -Continue home synthroid 100mcg     Chronic Hypertension  -Continue home imdur and lopressor    DMII  A1c 7.4 on 3/2024, no home meds  -SSI while inpatient    Hx of CABG 2007, CAD  HLD  -No acute issues, stopped statin/asa due to age    Healthcare maintenance   -primary care provider is Seb Gray MD     Diet: Cardiac  VTE PPx: Lovenox  Code Status: DNR, verified with son at bedside.    Dispo: Admit to obs for urology consult in AM and improvement of KANCHAN  Estimated LOS: 1-2 days.     Kaleb Christianson MD PhD  [facility] Night Float  [facility] Team A

## 2024-04-14 NOTE — PROGRESS NOTES
"[facility] Medicine progress note    Admitting Team: hospitals Hospitalist Team A  Attending Physician: Lety Heath MD  Resident: Dr. Velez    Date of Admit: 4/13/2024    Chief Complaint     Stomach pain x 1 day    Subjective:      Patient reports resolution of symptoms after relieving urinary obstruction.  Son in the room and asking about placement as patient difficult to care for at home.  Lives with wife who is 93 yo.  Patient has declined recently and waxes and wanes in mental status.  Today patient is alert and oriented with no complaints.  Legs hurt him sometimes but otherwise no issues.  Urine flowing and feels much better.     Objective       Physical Examination:    /68 (BP Location: Right arm)   Pulse 80   Temp 98.3 °F (36.8 °C) (Oral)   Resp 16   Ht 5' 7" (1.702 m)   Wt 79.6 kg (175 lb 7.8 oz)   SpO2 95%   BMI 27.49 kg/m²      General: A&Ox3, resting comfortably in bed, poor memory, unable to recall recent events  HEENT: EOMI, moist mucus membranes w/ no erythema noted, no facial asymmetry noted  Neck: Trachea midline, no masses, no lymphadenopathy  Cardiovascular: Regular rate and rhythm, normal S1 and S2, no murmurs, rubs or gallops  Pulm:R sided crackles, no respiratory distress  Abdomen: Soft, non-tender, non-distended; no organomegaly  : Ramirez in place with active urine flow, clear  Skin: several ulcers on lower extremeties, tense bullae on R shin  Extremities: BLE ulcers, not infected appearing, pitting edema in bilatearl lower extremities            Pulses: 1+ LE pulses  Neurological: A&Ox3, 5/5 upper and lower extremity strength, face midline, smile symmetric  Psychiatric: Normal mood and affect, thought content normal    Laboratory:  Recent Labs   Lab 04/09/24  1416 04/13/24  2105 04/13/24  2132 04/14/24  0521   WBC 9.23 15.35*  --  15.09*   HGB 12.7* 13.6*  --  13.1*    315  --  333   MCV 90 87  --  90     --  133* 138   K 3.8  --  4.4 4.2     --  97 102 "   CO2 28  --  22* 26   BUN 12  --  29 27   CREATININE 1.0  --  2.1* 1.7*   *  --  175* 188*   CALCIUM 8.8  --  9.0 8.8   PROT 6.0  --  6.7 6.3   ALBUMIN 2.4*  --  2.4* 2.2*   PHOS  --   --   --  3.5   MG  --   --   --  1.8   AST 20  --  34 27   ALT 18  --  27 22   ALKPHOS 145*  --  173* 153*       All laboratory data reviewed    Microbiology Data: Ucx pending    EKG Data: None    Radiology Data: CXR pending     Assessment/Plan     Claude T Dupuis is a 89 y.o. male with past medical history of CAD s/p CABG 2007, dementia, HTN, HLD, PAD, venous stasis ulcers, and penile meatus stricture who presented on 4/13/2024 for abdominal pain x 1 day. Patient is admitted to [facility] for post-obstructive KANCHAN 2/2 acute urinary retention from koroma malfunction.     Acute Urinary Retention  Recently discharged with indwelling koroma on 4/4/2024 secondary to acute urinary retention from meatal stricture. Required urology for placement, followed up with Dr. Guerra with plans for voiding trial with home health. Koroma manipulated by ED staff, now flowing. -No urology coverage today, consult in AM (Dr. Guerra available then)  -Gentle hydration with IVF, monitor for post-obstructive diuresis  -Urology consulted  - Repeat UA with new catheter/bag    KANCHAN, likely post-obstructive  Hematuria  Pyuria/Bacteruria  Admit Cr 2.1, elevated from 1 four days prior. UA with above findings. Pt's symptoms including elevated WBC likely stemming from acute stress response to retention. Pt currently without any pain.  -CTM, low threshold to start abx if fevers or WBC worsens  -Improving post relief of obstruction, CTM    Failure to thrive  Low Albumin  Cognitive Decline  Per Son, patient has had a notable physical and mental decline within the last month. Was ambulating with a walker roughly a month ago however now is bound to wheelchair 2/2 to knee pain and generalized fatigue. Notes his episodes of confusion have been increasing and he  requires frequent re-orientation. Appears to have a >1month decline across several domains. Son is concerned that family is unable to provide the level of care he requires  -PT/OT ordered for functional assessment  -Discussions with case management for placement options  -Consider palliative consult pending discussions with rest of family during day    Chronic LE Ulcers  Hx of chronic LE ulcers from PAD and venous insufficiency. Toe pressures in 2022 with severe arterial disease. Has frequent wound care, non appear infected  -wound care while inpatient.  - Pain management if needed  - Does not look acutely infected, holding off on abx    Hypothyroidism  -TSH 10.46 seven days prior, questionable utility due drawn during acute healthcare event  -Continue home synthroid 100mcg     Chronic Hypertension  -Continue home imdur and lopressor    DMII  A1c 7.4 on 3/2024, no home meds  -SSI while inpatient    Hx of CABG 2007, CAD  HLD  -No acute issues, stopped statin/asa due to age    Healthcare maintenance   -primary care provider is Seb Gray MD     Diet: Cardiac  VTE PPx: Lovenox  Code Status: DNR, verified with son at bedside.    Dispo: admit for urology consultation  Estimated LOS: 1-2 days.       Omkar Velez MD  [facility]  [facility]

## 2024-04-14 NOTE — PT/OT/SLP EVAL
Occupational Therapy   Evaluation and treatment    Name: Claude T Dupuis  MRN: [id number]  Admitting Diagnosis: KANCHAN (acute kidney injury)  Recent Surgery: * No surgery found *      Recommendations:     Discharge Recommendations:  (24/7 assist; recommend palliative consult)  Discharge Equipment Recommendations:  lift device, hospital bed    This patient requires a hospital bed due to:   Required positioning of the body in ways not feasible with an ordinary bed to alleviate pain / is completely immobile / or has limited mobility and cannot independently change their position without the use of the bed. The positioning of their body cannot be sufficiently resolved with the use of pillows and wedges. Required to reduce caregiver burden during toileting care.        Barriers to discharge:  Other (Comment) (home environment requiring caregiver/family to lift patient into home 2/2 multi steps; increased caregiver demand - needs increased resources - pending palliative consult)    Assessment:     Claude T Dupuis is a 89 y.o. male with a medical diagnosis of KANCHAN (acute kidney injury).  He presents with ..The primary encounter diagnosis was Uropathy, obstructive. Diagnoses of Acute cystitis with hematuria, Acute urinary retention, Idiopathic hypersomnia with long sleep time, Controlled type 2 diabetes mellitus with other circulatory complication, without long-term current use of insulin, PAD (peripheral artery disease), KANCHAN (acute kidney injury), Urinary obstruction, Failure to thrive in adult, Physical deconditioning, Venous stasis ulcers of both lower extremities, Coronary artery disease involving native coronary artery of native heart without angina pectoris, Primary hypertension, and Hyponatremia were also pertinent to this visit.  . Performance deficits affecting function: weakness, visual deficits, impaired endurance, impaired cognition, decreased ROM, impaired sensation, decreased coordination, impaired self care skills,  decreased upper extremity function, impaired functional mobility, decreased lower extremity function, impaired skin, decreased safety awareness, edema, impaired balance, pain, impaired joint extensibility.      OT / PT coeval performed 2/2 complexity and anticipated need for dual skilled staff. Per patient's son patient with hx of dementia with downtrending function ongoing, increased decline in past 2-3 weeks whereas prior to 1-2 months ago could perform short distance functional mobility, participate in partial ADLs, and more able to self-communicate needs, patient for the past 2-3 weeks requiring rotating family assist to lift patient and transfer to bedside commode, total care for LB ADLs, and moderate assist for UB ADLs with transient episodes of increased confusion and out of nature/displeasing behavior. Decline in PO intake the past 2-3 weeks. Patient also with hx of 2-3 falls in the past month.     On evaluation this date, patient cooperative and able to follow simple commands, indicating discomfort to buttocks and BLE.  Requiring moderate assist x2 therapists for supine to sit to supine transitions; did not attempt stand 2/2 pain. Unable to perform lateral scooting - did not facilitate extensive efforts to prevent buttocks shearing. Moderate assist for simple grooming ADL. Skilled acute OT to follow to support patient/ provide caregiver education / DME recommendations / maximize quality of life. Recommend palliative care consultation (patient's son amenable and seeking information upon conversation); recommend 24/7 assist, hospital bed, and lift device.    Rehab Prognosis: Fair; patient would benefit from acute skilled OT services to address these deficits and reach maximum level of function.       Plan:     Patient to be seen 3 x/week to address the above listed problems via self-care/home management, therapeutic activities, therapeutic exercises  Plan of Care Expires: 05/12/24  Plan of Care Reviewed with:  patient, son    Subjective     Chief Complaint: indicates discomfort/ B LE/buttocks  Patient/Family Comments/goals: Patient's son reports appreciation for therapists' efforts and indicates verbally desire to have increased resources to help his father and acknowledges patient's continued decline.    Occupational Profile: per son Anton  Living Environment: Patient resides with 94 year old spouse in a single story home, with incline and 2 steps to enter home followed by 1 step down to enter living area. Tub shower combo with tub transfer bench.  Previous level of function: Per patient's son patient with hx of dementia with downtrending function ongoing, increased decline in past 2-3 weeks whereas prior to 1-2 months ago could perform short distance functional mobility, participate in partial ADLs, and more able to self-communicate needs, patient for the past 2-3 weeks requiring rotating family assist to lift patient from lift chair which he sleeps in, and transfer to bedside commode, total care for LB ADLs, and moderate assist for UB ADLs with transient episodes of increased confusion and out of nature/displeasing behavior. Decline in PO intake the past 2-3 weeks. Patient also with hx of 2-3 falls in the past month. Has not been in the true shower for > a month. Has had home health therapy in the recent past with minimal improvements - son reports it has been 2 weeks since HH PT was able to stand patient.  Equipment Used at Home: other (see comments) (bedside commode; lift recliner; whelchair; has but was not recently using: rolling walker, tub transfer bench)  Assistance upon Discharge: family; per Anton patient has been having 24/7 support from rotating family however, seeks increased resources    Pain/Comfort:  Pain Rating 1: other (see comments) (discomfort unrated in Buttocks/ BLE (more distal than proximal))  Pain Addressed 1: Reposition, Cessation of Activity, Nurse notified, Other (see comments) (requested  waffle overlay mattress from central supply)  Pain Rating Post-Intervention 1: other (see comments) (increased buttocks pain in seated)      Objective:     Communicated with: nursing prior to session.  Patient found HOB elevated with bed alarm, peripheral IV, koroma catheter upon OT entry to room.    General Precautions: Standard, fall  Orthopedic Precautions: N/A  Braces: N/A  Respiratory Status: Room air    Occupational Performance:    Bed Mobility:    Patient completed Supine to Sit with moderate assist x2, scooting to eob with increased time, moderate assist x2  Patient completed Sit to Supine with moderate assist x2 ; dependence x2 for repositioning with use of trendelenburg    Functional Mobility/Transfers:  Patient completed Sit <> Stand Transfer with unable to attempt/ deferred attempt  with  2/2 increased discomfort sitting   Functional Mobility: CGA/SBA sitting eob briefly; lateral seated scoots attempted with max assist x2, but terminated due to risk of shearing skin and decreased ability    Activities of Daily Living:  Feeding:  not formerly assessed; has functional range for hand to mouth; however, decreased PO intake (pending nutritionist/dietician consult); 1:1 assist needed   Grooming: moderate assistance ; partial performance; verbal cues for initiation and termination of task; position seated eob  Lower Body Dressing: dependence    Toileting: dependence ; catheter    Cognitive/Visual Perceptual:  Cognitive/Psychosocial Skills:     -       Oriented to: name/; son in room; month with verbal cues and choices; year; hospital setting; and able to localize discomfort pain to BLE /sore feet and buttock   -       Follows Commands/attention:follows one step functional commands with visual/tactile cues at least 50% of the time  -       Communication: very hard of hearing; hears better from left ear; moments of clarity in which patient conversant  -       Memory: Poor immediate recall and per son has  dementia diagnosis  -       Safety awareness/insight to disability: impaired   -       Mood/Affect/Coping skills/emotional control: Cooperative  Visual/Perceptual:      -decreased visual acuity/peripheral; son indicates hx of glaucoma    Physical Exam:  Skin integrity: ulcers on BLE;R leg blister; bandage on Buttocks (wound care RN following patient)  Edema:  BLE  Dominant hand:    -       right  Upper Extremity Range of Motion:     -       B Upper Extremity: WFL for ADL self feeding needs; limited exam; decreased proximal shoulder range  Upper Extremity Strength: deficits; unable to follow MMT commands; accepts antigravity in all planes   Strength: B gross grasp intact; decreased firm grasp    AMPAC 6 Click ADL:  AMPAC Total Score: 8    Treatment & Education:  Patient and son educated on role of OT/ POC development.   Co-evaluation with physical therapy in consideration of complexity.   Occupational profile developed primarily via interview with son.   Clarifying questions obtained to fill in hx.  Patient guided to transition eob for assessment as above with hand placement assist for bed rails, management of koroma cath, and increased time. Attempted lateral side scoots but unable. ADL grooming as above. Returned to supine. Applied B heel pressure relief boots to patient and educated son on use and purpose.   OT with communication to RN regarding waffle overlay mattress recommendation (RN in communication to central supply).  Initiated education to son/patient on potential need for hospital bed.  Facilitated discussion with son regarding increased caregiver concerns and downtrending function of patient in which son desires communication with palliative team.   Answered questions within scope.      Patient left HOB elevated with all lines intact, call button in reach, bed alarm on, and nursing notified    GOALS:   Multidisciplinary Problems       Occupational Therapy Goals          Problem: Occupational Therapy     Goal Priority Disciplines Outcome Interventions   Occupational Therapy Goal     OT, PT/OT Ongoing, Progressing    Description: Goals to be met by: 5/12/2024     Patient will increase functional independence with ADLs by performing:    Feeding with Stand-by Assistance with simplified meal setup/ verbal cues for initiation.  Grooming with Contact Guard Assistance with verbal / tactile cues as needed.  Rolling to Bilateral with Contact Guard Assistance for pressure relief, skin integrity risk reduction, and to decreased burden of care during pericare.  Toilet transfer to bedside commode with Moderate Assistance via least restrictive method (ie. Squat pivot).  Family reciprocation of DME recommendations to maximize quality of life, maximize patient participation, and decrease caregiver burden.  Family or caregiver 100% verbalized reciprocation of dementia friendly recommendations (ex: simplify choices; decrease visual clutter; remove hazardous objects; maximize familiarity, etc) to support patient's wellbeing and highest level of occupational engagement and participation in least restrictive discharge environment                          History:     Past Medical History:   Diagnosis Date    Cancer     Colon cancer     Coronary artery disease     Guillain-[place] syndrome     Hx of CABG 09/07/2022    Hypertension     PAD (peripheral artery disease) 09/28/2022    Type 2 diabetes mellitus without complications          Past Surgical History:   Procedure Laterality Date    COLON SURGERY  2005    CORONARY ARTERY BYPASS GRAFT  2007    EYE SURGERY      VEIN BYPASS SURGERY         Time Tracking:     OT Date of Treatment: 04/14/24  OT Start Time: 0958  OT Stop Time: 1037  OT Total Time (min): 39 min     Billable Minutes:Evaluation 15 min  Self Care/Home Management 10 min  Therapeutic Activity 14 min    4/14/2024

## 2024-04-14 NOTE — H&P
"[facility] Medicine H&P Note     Admitting Team: hospitals Hospitalist Team A  Attending Physician: Lety Heath MD  Resident: Dr. Velez    Date of Admit: 4/13/2024    Chief Complaint     Stomach pain x 1 day    Subjective:      Patient reports resolution of symptoms after relieving urinary obstruction.  Son in the room and asking about placement as patient difficult to care for at home.  Lives with wife who is 95 yo.  Patient has declined recently and waxes and wanes in mental status.  Today patient is alert and oriented with no complaints.  Legs hurt him sometimes but otherwise no issues.  Urine flowing and feels much better.     Objective       Physical Examination:    /68 (BP Location: Right arm)   Pulse 80   Temp 98.3 °F (36.8 °C) (Oral)   Resp 16   Ht 5' 7" (1.702 m)   Wt 79.6 kg (175 lb 7.8 oz)   SpO2 95%   BMI 27.49 kg/m²      General: A&Ox3, resting comfortably in bed, poor memory, unable to recall recent events  HEENT: EOMI, moist mucus membranes w/ no erythema noted, no facial asymmetry noted  Neck: Trachea midline, no masses, no lymphadenopathy  Cardiovascular: Regular rate and rhythm, normal S1 and S2, no murmurs, rubs or gallops  Pulm:R sided crackles, no respiratory distress  Abdomen: Soft, non-tender, non-distended; no organomegaly  : Ramirez in place with active urine flow, clear  Skin: several ulcers on lower extremeties, tense bullae on R shin  Extremities: BLE ulcers, not infected appearing, pitting edema in bilatearl lower extremities            Pulses: 1+ LE pulses  Neurological: A&Ox3, 5/5 upper and lower extremity strength, face midline, smile symmetric  Psychiatric: Normal mood and affect, thought content normal    Laboratory:  Recent Labs   Lab 04/09/24  1416 04/13/24  2105 04/13/24  2132 04/14/24  0521   WBC 9.23 15.35*  --  15.09*   HGB 12.7* 13.6*  --  13.1*    315  --  333   MCV 90 87  --  90     --  133* 138   K 3.8  --  4.4 4.2     --  97 102   CO2 " 28  --  22* 26   BUN 12  --  29 27   CREATININE 1.0  --  2.1* 1.7*   *  --  175* 188*   CALCIUM 8.8  --  9.0 8.8   PROT 6.0  --  6.7 6.3   ALBUMIN 2.4*  --  2.4* 2.2*   PHOS  --   --   --  3.5   MG  --   --   --  1.8   AST 20  --  34 27   ALT 18  --  27 22   ALKPHOS 145*  --  173* 153*       All laboratory data reviewed    Microbiology Data: Ucx pending    EKG Data: None    Radiology Data: CXR pending     Assessment/Plan     Claude T Dupuis is a 89 y.o. male with past medical history of CAD s/p CABG 2007, dementia, HTN, HLD, PAD, venous stasis ulcers, and penile meatus stricture who presented on 4/13/2024 for abdominal pain x 1 day. Patient is admitted to [facility] for post-obstructive KANCHAN 2/2 acute urinary retention from koroma malfunction.     Acute Urinary Retention  Recently discharged with indwelling koroma on 4/4/2024 secondary to acute urinary retention from meatal stricture. Required urology for placement, followed up with Dr. Guerra with plans for voiding trial with home health. Koroma manipulated by ED staff, now flowing. -No urology coverage today, consult in AM (Dr. Guerra available then)  -Gentle hydration with IVF, monitor for post-obstructive diuresis  -Urology consulted  - Repeat UA with new catheter/bag    KANCHAN, likely post-obstructive  Hematuria  Pyuria/Bacteruria  Admit Cr 2.1, elevated from 1 four days prior. UA with above findings. Pt's symptoms including elevated WBC likely stemming from acute stress response to retention. Pt currently without any pain.  -CTM, low threshold to start abx if fevers or WBC worsens  -Improving post relief of obstruction, CTM    Failure to thrive  Low Albumin  Cognitive Decline  Per Son, patient has had a notable physical and mental decline within the last month. Was ambulating with a walker roughly a month ago however now is bound to wheelchair 2/2 to knee pain and generalized fatigue. Notes his episodes of confusion have been increasing and he requires  frequent re-orientation. Appears to have a >1month decline across several domains. Son is concerned that family is unable to provide the level of care he requires  -PT/OT ordered for functional assessment  -Discussions with case management for placement options  -Consider palliative consult pending discussions with rest of family during day    Chronic LE Ulcers  Hx of chronic LE ulcers from PAD and venous insufficiency. Toe pressures in 2022 with severe arterial disease. Has frequent wound care, non appear infected  -wound care while inpatient.  - Pain management if needed  - Does not look acutely infected, holding off on abx    Hypothyroidism  -TSH 10.46 seven days prior, questionable utility due drawn during acute healthcare event  -Continue home synthroid 100mcg     Chronic Hypertension  -Continue home imdur and lopressor    DMII  A1c 7.4 on 3/2024, no home meds  -SSI while inpatient    Hx of CABG 2007, CAD  HLD  -No acute issues, stopped statin/asa due to age    Healthcare maintenance   -primary care provider is Seb Gray MD     Diet: Cardiac  VTE PPx: Lovenox  Code Status: DNR, verified with son at bedside.    Dispo: admit for urology consultation  Estimated LOS: 1-2 days.       Omkar Velez MD  [facility]

## 2024-04-14 NOTE — ED NOTES
BIB EMS for urinary retention. Pt arrived w/ koroma in place, inserted by urology. Pt arrived w/ orthopedic bilat shoes, per son at bedside, pt sees wound care for ongoing treatment. GCS15, AAOx4

## 2024-04-14 NOTE — CONSULTS
Urology Note    Consult received. Discussed care with primary team. Leave koroma to gravity. Nurses can irrigate PRN. Will evaluate tomorrow morning and determine whether or not the koroma should be removed or replaced.    Darci Guerra MD  Urology  [facility]  [place]

## 2024-04-14 NOTE — MEDICAL/APP STUDENT
"     [facility] Medicine H&P Note      Admitting Team: [facility] Hospitalist Team A  Attending Physician: Lety Heath MD  Resident: Dr. Velez     Date of Admit: 2024     Chief Complaint         Stomach pain x1 day      Subjective:      Pt states abdominal pain resolved after resolution of urinary obstruction. He currently only complains of BLE pain that waxes and wanes. Most history is provided by son. Son states mental status declining over past month more than baseline. States no suprapubic pain  Objective:       Last 24 Hour Vital Signs:  BP  Min: 134/72  Max: 166/72  Temp  Av.3 °F (36.8 °C)  Min: 97.9 °F (36.6 °C)  Max: 98.6 °F (37 °C)  Pulse  Av.2  Min: 76  Max: 94  Resp  Av  Min: 16  Max: 16  SpO2  Av %  Min: 95 %  Max: 98 %  Height  Av' 7" (170.2 cm)  Min: 5' 7" (170.2 cm)  Max: 5' 7" (170.2 cm)  Weight  Av.6 kg (177 lb 11.9 oz)  Min: 79.6 kg (175 lb 7.8 oz)  Max: 81.6 kg (180 lb)  I/O last 3 completed shifts:  In: 310.6 [I.V.:310.6]  Out: 2950 [Urine:2950]    Physical Examination:  General: A&Ox3, resting comfortably in bed, poor short term memory, trouble recalling recent events, but remembers being in ED   HEENT: EOMI, moist mucus membranes w/ no erythema noted, no facial asymmetry noted  Neck: Trachea midline, no masses, no lymphadenopathy  Cardiovascular: Regular rate and rhythm, normal S1 and S2, no murmurs, rubs or gallops  Pulm: mild R sided crackles, no distress  Abdomen: Soft, non-tender, non-distended; no organomegaly  : Ramirez in place with active urine flow  Skin: several ulcers on lower extremeties, tense bullae on R shin  Extremities: BLE ulcers, not infected appearing (images in H&P)    Laboratory:  Laboratory Data Reviewed: yes  Pertinent Findings:    Admission on 2024   Component Date Value Ref Range Status    WBC 2024 15.35 (H)  3.90 - 12.70 K/uL Final    RBC 2024 4.47 (L)  4.60 - 6.20 M/uL Final    Hemoglobin 2024 13.6 (L)  " 14.0 - 18.0 g/dL Final    Hematocrit 04/13/2024 39.0 (L)  40.0 - 54.0 % Final    MCV 04/13/2024 87  82 - 98 fL Final    MCH 04/13/2024 30.4  27.0 - 31.0 pg Final    MCHC 04/13/2024 34.9  32.0 - 36.0 g/dL Final    RDW 04/13/2024 14.4  11.5 - 14.5 % Final    Platelets 04/13/2024 315  150 - 450 K/uL Final    MPV 04/13/2024 10.4  9.2 - 12.9 fL Final    Immature Granulocytes 04/13/2024 0.6 (H)  0.0 - 0.5 % Final    Gran # (ANC) 04/13/2024 12.3 (H)  1.8 - 7.7 K/uL Final    Immature Grans (Abs) 04/13/2024 0.09 (H)  0.00 - 0.04 K/uL Final    Lymph # 04/13/2024 1.7  1.0 - 4.8 K/uL Final    Mono # 04/13/2024 1.2 (H)  0.3 - 1.0 K/uL Final    Eos # 04/13/2024 0.1  0.0 - 0.5 K/uL Final    Baso # 04/13/2024 0.04  0.00 - 0.20 K/uL Final    nRBC 04/13/2024 0  0 /100 WBC Final    Gran % 04/13/2024 79.8 (H)  38.0 - 73.0 % Final    Lymph % 04/13/2024 10.9 (L)  18.0 - 48.0 % Final    Mono % 04/13/2024 7.9  4.0 - 15.0 % Final    Eosinophil % 04/13/2024 0.5  0.0 - 8.0 % Final    Basophil % 04/13/2024 0.3  0.0 - 1.9 % Final    Differential Method 04/13/2024 Automated   Final    Specimen UA 04/13/2024 Urine, Catheterized   Final    Color, UA 04/13/2024 Brown (A)  Yellow, Straw, Aleksandra Final    Appearance, UA 04/13/2024 Cloudy (A)  Clear Final    pH, UA 04/13/2024 6.0  5.0 - 8.0 Final    Specific Gravity, UA 04/13/2024 1.015  1.005 - 1.030 Final    Protein, UA 04/13/2024 2+ (A)  Negative Final    Glucose, UA 04/13/2024 Trace (A)  Negative Final    Ketones, UA 04/13/2024 Negative  Negative Final    Bilirubin (UA) 04/13/2024 Negative  Negative Final    Occult Blood UA 04/13/2024 3+ (A)  Negative Final    Nitrite, UA 04/13/2024 Negative  Negative Final    Urobilinogen, UA 04/13/2024 Negative  <2.0 EU/dL Final    Leukocytes, UA 04/13/2024 2+ (A)  Negative Final    Sodium 04/13/2024 133 (L)  136 - 145 mmol/L Final    Potassium 04/13/2024 4.4  3.5 - 5.1 mmol/L Final    Chloride 04/13/2024 97  95 - 110 mmol/L Final    CO2 04/13/2024 22 (L)  23 - 29  mmol/L Final    Glucose 04/13/2024 175 (H)  70 - 110 mg/dL Final    BUN 04/13/2024 29  10 - 30 mg/dL Final    Creatinine 04/13/2024 2.1 (H)  0.5 - 1.4 mg/dL Final    Calcium 04/13/2024 9.0  8.7 - 10.5 mg/dL Final    Total Protein 04/13/2024 6.7  6.0 - 8.4 g/dL Final    Albumin 04/13/2024 2.4 (L)  3.5 - 5.2 g/dL Final    Total Bilirubin 04/13/2024 1.0  0.1 - 1.0 mg/dL Final    Alkaline Phosphatase 04/13/2024 173 (H)  55 - 135 U/L Final    AST 04/13/2024 34  10 - 40 U/L Final    ALT 04/13/2024 27  10 - 44 U/L Final    eGFR 04/13/2024 28 (A)  >60 mL/min/1.73 m^2 Final    Anion Gap 04/13/2024 14  8 - 16 mmol/L Final    RBC, UA 04/13/2024 >100 (H)  0 - 4 /hpf Final    WBC, UA 04/13/2024 >100 (H)  0 - 5 /hpf Final    Bacteria 04/13/2024 Moderate (A)  None-Occ /hpf Final    Hyaline Casts, UA 04/13/2024 0  0-1/lpf /lpf Final    Microscopic Comment 04/13/2024 SEE COMMENT   Final    Sodium, Urine 04/13/2024 42  20 - 250 mmol/L Final    Protein, Urine Random 04/13/2024 267 (H)  0 - 15 mg/dL Final    Creatinine, Urine 04/13/2024 65.0  23.0 - 375.0 mg/dL Final    Prot/Creat Ratio, Urine 04/13/2024 4.11 (H)  0.00 - 0.20 Final    Urine Urea Nitrogen 04/13/2024 390  140 - 1050 mg/dL Final    Phosphorus 04/14/2024 3.5  2.7 - 4.5 mg/dL Final    Magnesium 04/14/2024 1.8  1.6 - 2.6 mg/dL Final    Sodium 04/14/2024 138  136 - 145 mmol/L Final    Potassium 04/14/2024 4.2  3.5 - 5.1 mmol/L Final    Chloride 04/14/2024 102  95 - 110 mmol/L Final    CO2 04/14/2024 26  23 - 29 mmol/L Final    Glucose 04/14/2024 188 (H)  70 - 110 mg/dL Final    BUN 04/14/2024 27  10 - 30 mg/dL Final    Creatinine 04/14/2024 1.7 (H)  0.5 - 1.4 mg/dL Final    Calcium 04/14/2024 8.8  8.7 - 10.5 mg/dL Final    Total Protein 04/14/2024 6.3  6.0 - 8.4 g/dL Final    Albumin 04/14/2024 2.2 (L)  3.5 - 5.2 g/dL Final    Total Bilirubin 04/14/2024 0.6  0.1 - 1.0 mg/dL Final    Alkaline Phosphatase 04/14/2024 153 (H)  55 - 135 U/L Final    AST 04/14/2024 27  10 - 40 U/L  Final    ALT 04/14/2024 22  10 - 44 U/L Final    eGFR 04/14/2024 37 (A)  >60 mL/min/1.73 m^2 Final    Anion Gap 04/14/2024 10  8 - 16 mmol/L Final    WBC 04/14/2024 15.09 (H)  3.90 - 12.70 K/uL Final    RBC 04/14/2024 4.42 (L)  4.60 - 6.20 M/uL Final    Hemoglobin 04/14/2024 13.1 (L)  14.0 - 18.0 g/dL Final    Hematocrit 04/14/2024 39.6 (L)  40.0 - 54.0 % Final    MCV 04/14/2024 90  82 - 98 fL Final    MCH 04/14/2024 29.6  27.0 - 31.0 pg Final    MCHC 04/14/2024 33.1  32.0 - 36.0 g/dL Final    RDW 04/14/2024 14.2  11.5 - 14.5 % Final    Platelets 04/14/2024 333  150 - 450 K/uL Final    MPV 04/14/2024 9.7  9.2 - 12.9 fL Final    Immature Granulocytes 04/14/2024 0.6 (H)  0.0 - 0.5 % Final    Gran # (ANC) 04/14/2024 12.4 (H)  1.8 - 7.7 K/uL Final    Immature Grans (Abs) 04/14/2024 0.09 (H)  0.00 - 0.04 K/uL Final    Lymph # 04/14/2024 1.5  1.0 - 4.8 K/uL Final    Mono # 04/14/2024 1.0  0.3 - 1.0 K/uL Final    Eos # 04/14/2024 0.0  0.0 - 0.5 K/uL Final    Baso # 04/14/2024 0.04  0.00 - 0.20 K/uL Final    nRBC 04/14/2024 0  0 /100 WBC Final    Gran % 04/14/2024 82.3 (H)  38.0 - 73.0 % Final    Lymph % 04/14/2024 9.8 (L)  18.0 - 48.0 % Final    Mono % 04/14/2024 6.7  4.0 - 15.0 % Final    Eosinophil % 04/14/2024 0.3  0.0 - 8.0 % Final    Basophil % 04/14/2024 0.3  0.0 - 1.9 % Final    Differential Method 04/14/2024 Automated   Final    POCT Glucose 04/14/2024 187 (H)  70 - 110 mg/dL Final         Microbiology Data Reviewed: yes; Ucx pending    EKG Data: none    Pertinent Findings:  .   Latest Reference Range & Units 04/13/24 21:22   Specimen UA  Urine, Catheterized   Color, UA Yellow, Straw, Aleksandra  Brown !   Appearance, UA Clear  Cloudy !   Specific Gravity, UA 1.005 - 1.030  1.015   pH, UA 5.0 - 8.0  6.0   Protein, UA Negative  2+ !   Glucose, UA Negative  Trace !   Ketones, UA Negative  Negative   Blood, UA Negative  3+ !   NITRITE UA Negative  Negative   UROBILINOGEN UA <2.0 EU/dL Negative   Bilirubin (UA) Negative   Negative   Leukocyte Esterase, UA Negative  2+ !   RBC, UA 0 - 4 /hpf >100 (H)   WBC, UA 0 - 5 /hpf >100 (H)   Bacteria, UA None-Occ /hpf Moderate !   Hyaline Casts, UA 0-1/lpf /lpf 0   Microscopic Comment  SEE COMMENT   !: Data is abnormal  (H): Data is abnormally high    Radiology Data Reviewed: yes  Pertinent Findings:  XR CHEST AP PORTABLE     CLINICAL HISTORY:  leukocytosis; Acute cystitis with hematuria     TECHNIQUE:  Single frontal view of the chest was performed.     COMPARISON:  04/29/2019.     FINDINGS:  Sternotomy wires, similar to prior.  Patient rotated to the right.  Underinflated lungs with hypoventilatory change.  Increased interstitial markings in the lungs, similar compared to prior.  Chronic elevation right hemidiaphragm.     Heart and lungs  appear unchanged when allowing for differences in technique and positioning.     Impression:     Chronic findings as above.     No significant change from prior study.        Electronically signed by:Gabriel Vieira MD  Date:                                            04/14/2024  Time:                                           02:40      Lines and Day Number of Therapy:  I/O last 3 completed shifts:  In: 310.6 [I.V.:310.6]  Out: 2950 [Urine:2950]  I/O this shift:  In: 240 [P.O.:240]  Out: -         Assessment:   Claude T Dupuis is a 89 y.o. male with past medical history of CAD s/p CABG 2007, dementia, HTN, HLD, PAD, venous stasis ulcers, and penile meatus stricture who presented on 4/13/2024 for abdominal pain x 1 day. Patient is admitted to [facility] for post-obstructive KANCHAN 2/2 acute urinary retention from koroma malfunction.     Acute Urinary Retention  Recently discharged with indwelling koroma on 4/4/2024 secondary to acute urinary retention from meatal stricture. Required urology for placement, followed up with Dr. Guerra with plans for voiding trial with home health. Koroma manipulated by ED staff, now flowing. -No urology coverage today, consult in  AM (Dr. Guerra available then)  -Gentle hydration with IVF, monitor for post-obstructive diuresis  -Urology consult today.     KANCHAN, likely post-obstructive  Hematuria  Pyuria/Bacteruria  Admit Cr 2.1, elevated from 1 four days prior. UA with above findings. Pt's symptoms including elevated WBC likely stemming from acute stress response to retention. Pt currently without any pain.  -CTM, low threshold to start abx if fevers or WBC worsens  -Urine studies ordered. Empty urine then get urine culture from fresh urine     Failure to thrive  Low Albumin  Cognitive Decline  Per Son, patient has had a notable physical and mental decline within the last month. Was ambulating with a walker roughly a month ago however now is bound to wheelchair 2/2 to knee pain and generalized fatigue. Notes his episodes of confusion have been increasing and he requires frequent re-orientation. Appears to have a >1month decline across several domains. Son is concerned that family is unable to provide the level of care he requires  -PT/OT ordered for functional assessment  -Discussions with case management for placement options  -Consider palliative consult pending discussions with rest of family during day     Chronic LE Ulcers  Hx of chronic LE ulcers from PAD and venous insufficiency. Toe pressures in 2022 with severe arterial disease. Has frequent wound care, non appear infected  -wound care while inpatient.     Hypothyroidism  -TSH 10.46 seven days prior, questionable utility due drawn during acute healthcare event  -Continue home synthroid 100mcg      Chronic Hypertension  -Continue home imdur and lopressor     DMII  A1c 7.4 on 3/2024, no home meds  -SSI while inpatient     Hx of CABG 2007, CAD  HLD  -No acute issues, stopped statin/asa due to age     Healthcare maintenance   -primary care provider is Seb Gray MD      Diet: Cardiac  VTE PPx: Lovenox  Code Status: DNR, verified with son at bedside.     Dispo: Admit for urology consult    Estimated LOS: 1-2 days.        CHESTER Fierro-S3  [facility]

## 2024-04-14 NOTE — ED PROVIDER NOTES
Encounter Date: 4/13/2024       History     Chief Complaint   Patient presents with    Urinary Retention     Arrived with koroma catheter in place. Pt has only put out 100 ml urine since 6 a.m. Urine red. EMT gave pt 100 ml NS.     HPI    This is a pleasant 89-year-old male with multiple medical problems who presents the ER for evaluation of urinary retention.  Patient was seen evaluated on the 9th in this ER.  For same complaint.  Extensive workup including CT scans revealed blood products in the bladder Koroma was placed with appropriate drainage.  It appears that patient was now had decreased drainage from his Koroma catheter, as well as worsening abdominal.  He came ER for evaluation.  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    Hypertension     PAD (peripheral artery disease) 09/28/2022    Type 2 diabetes mellitus without complications      Past Surgical History:   Procedure Laterality Date    COLON SURGERY  2005    CORONARY ARTERY BYPASS GRAFT  2007    EYE SURGERY      VEIN BYPASS SURGERY       Family History   Problem Relation Name Age of Onset    Diabetes Mother Radha Cox      Social History     Tobacco Use    Smoking status: Never     Passive exposure: Never    Smokeless tobacco: Never   Substance Use Topics    Alcohol use: No    Drug use: No     Review of Systems   Genitourinary:  Positive for difficulty urinating and hematuria.   All other systems reviewed and are negative.      Physical Exam     Initial Vitals [04/13/24 2015]   BP Pulse Resp Temp SpO2   (!) 166/72 92 16 98.4 °F (36.9 °C) 95 %      MAP       --         Physical Exam    Nursing note and vitals reviewed.  Constitutional:   Elderly,  chronically ill-appearing, no distress   HENT:   Head: Normocephalic and atraumatic.   Eyes: Pupils are equal, round, and reactive to light.   Neck:   Normal range of motion.  Cardiovascular:  Normal rate and regular rhythm.           Pulmonary/Chest: No respiratory distress.   Abdominal: Abdomen is soft.   Distended lower abdomen consistent with urinary retention   Musculoskeletal:         General: Edema present.      Cervical back: Normal range of motion.     Neurological: He is alert and oriented to person, place, and time. GCS score is 15. GCS eye subscore is 4. GCS verbal subscore is 5. GCS motor subscore is 6.   Skin: Skin is warm and dry. Capillary refill takes less than 2 seconds.   Psychiatric: He has a normal mood and affect. Thought content normal.         ED Course   Procedures  Labs Reviewed   CBC W/ AUTO DIFFERENTIAL - Abnormal; Notable for the following components:       Result Value    WBC 15.35 (*)     RBC 4.47 (*)     Hemoglobin 13.6 (*)     Hematocrit 39.0 (*)     Immature Granulocytes 0.6 (*)     Gran # (ANC) 12.3 (*)     Immature Grans (Abs) 0.09 (*)     Mono # 1.2 (*)     Gran % 79.8 (*)     Lymph % 10.9 (*)     All other components within normal limits   URINALYSIS, REFLEX TO URINE CULTURE - Abnormal; Notable for the following components:    Color, UA Brown (*)     Appearance, UA Cloudy (*)     Protein, UA 2+ (*)     Glucose, UA Trace (*)     Occult Blood UA 3+ (*)     Leukocytes, UA 2+ (*)     All other components within normal limits    Narrative:     Specimen Source->Urine   COMPREHENSIVE METABOLIC PANEL - Abnormal; Notable for the following components:    Sodium 133 (*)     CO2 22 (*)     Glucose 175 (*)     Creatinine 2.1 (*)     Albumin 2.4 (*)     Alkaline Phosphatase 173 (*)     eGFR 28 (*)     All other components within normal limits   URINALYSIS MICROSCOPIC - Abnormal; Notable for the following components:    RBC, UA >100 (*)     WBC, UA >100 (*)     Bacteria Moderate (*)     All  other components within normal limits    Narrative:     Specimen Source->Urine   CULTURE, URINE   SODIUM, URINE, RANDOM   PROTEIN / CREATININE RATIO, URINE   UREA NITROGEN, URINE, RANDOM          Imaging Results              X-Ray Chest AP Portable (Final result)  Result time 04/14/24 02:40:23      Final result by Gabriel Vieira MD (04/14/24 02:40:23)                   Impression:      Chronic findings as above.    No significant change from prior study.      Electronically signed by: Gabriel Vieira MD  Date:    04/14/2024  Time:    02:40               Narrative:    EXAMINATION:  XR CHEST AP PORTABLE    CLINICAL HISTORY:  leukocytosis; Acute cystitis with hematuria    TECHNIQUE:  Single frontal view of the chest was performed.    COMPARISON:  04/29/2019.    FINDINGS:  Sternotomy wires, similar to prior.  Patient rotated to the right.  Underinflated lungs with hypoventilatory change.  Increased interstitial markings in the lungs, similar compared to prior.  Chronic elevation right hemidiaphragm.    Heart and lungs  appear unchanged when allowing for differences in technique and positioning.                                       Medications   morphine injection 2 mg (2 mg Intravenous Not Given 4/13/24 8665)   isosorbide mononitrate 24 hr tablet 30 mg (has no administration in time range)   levothyroxine tablet 100 mcg (has no administration in time range)   metoprolol tartrate (LOPRESSOR) tablet 50 mg (has no administration in time range)   tamsulosin 24 hr capsule 0.8 mg (has no administration in time range)   sodium chloride 0.9% flush 10 mL (has no administration in time range)   naloxone 0.4 mg/mL injection 0.02 mg (has no administration in time range)   glucose chewable tablet 16 g (has no administration in time range)   glucose chewable tablet 24 g (has no administration in time range)   glucagon (human recombinant) injection 1 mg (has no administration in time range)   dextrose 10% bolus 125 mL 125 mL  (has no administration in time range)   dextrose 10% bolus 250 mL 250 mL (has no administration in time range)   glucose chewable tablet 16 g (has no administration in time range)   glucose chewable tablet 24 g (has no administration in time range)   glucagon (human recombinant) injection 1 mg (has no administration in time range)   insulin aspart U-100 pen 0-5 Units (has no administration in time range)   dextrose 10% bolus 125 mL 125 mL (has no administration in time range)   dextrose 10% bolus 250 mL 250 mL (has no administration in time range)   lactated ringers infusion ( Intravenous New Bag 4/14/24 0200)   enoxaparin injection 30 mg (has no administration in time range)   ondansetron disintegrating tablet 4 mg (4 mg Oral Given 4/13/24 2103)   sodium chloride 0.9% bolus 500 mL 500 mL (0 mLs Intravenous Stopped 4/13/24 2211)   cefTRIAXone (ROCEPHIN) 2 g in dextrose 5 % in water (D5W) 100 mL IVPB (MB+) (0 g Intravenous Stopped 4/13/24 2307)     Medical Decision Making  89 year old male with multiple medical problems presents to the ER for evaluation of urinary retention.  Patient was seen recently diagnosed with hematuria Ramirez catheter was placed.  Recently per EMS family noticed that he had decreased drainage and lower abdominal pain and came the ER.  He arrived in the ER, no acute distress lower abdominal tenderness in distended bladder palpated.  Bedside ultrasound revealed a proximally 700 cc of urine in the bladder.  Likely urinary retention from clot and Ramirez catheter.  Will plan blood work flushed out Ramirez possibly exchange, reassess anticipate discharge.    Amount and/or Complexity of Data Reviewed  Independent Historian: EMS  External Data Reviewed: labs, radiology, ECG and notes.  Labs: ordered. Decision-making details documented in ED Course.    Risk  Prescription drug management.               ED Course as of 04/14/24 0316   Sat Apr 13, 2024   2211 Comprehensive metabolic panel(!)  Patient with KANCHAN  likely postobstructive uropathy from urinary retention from hematuria.  Deflated balloon flushed catheter proximally 400 cc of bloody urine removed.  Will plan admission. [SE]      ED Course User Index  [SE] Riddhi Zapata MD                           Clinical Impression:  Final diagnoses:  [N30.01] Acute cystitis with hematuria  [R33.8] Acute urinary retention  [N13.9] Uropathy, obstructive (Primary)          ED Disposition Condition    Observation Stable                Riddhi Zapata MD  04/14/24 0312

## 2024-04-14 NOTE — PLAN OF CARE
Assumed care of patient. Patient in no apparent distress on room air. Patient had pain upon movement. Patient was intermittently confused throughout shift. Medications administered per MAR. Call light within reach, safety precautions maintained, bed alarm set.    Problem: Adult Inpatient Plan of Care  Goal: Plan of Care Review  Outcome: Ongoing, Progressing  Goal: Patient-Specific Goal (Individualized)  Outcome: Ongoing, Progressing  Goal: Absence of Hospital-Acquired Illness or Injury  Outcome: Ongoing, Progressing  Goal: Optimal Comfort and Wellbeing  Outcome: Ongoing, Progressing  Goal: Readiness for Transition of Care  Outcome: Ongoing, Progressing     Problem: Diabetes Comorbidity  Goal: Blood Glucose Level Within Targeted Range  Outcome: Ongoing, Progressing     Problem: Fluid and Electrolyte Imbalance (Acute Kidney Injury/Impairment)  Goal: Fluid and Electrolyte Balance  Outcome: Ongoing, Progressing     Problem: Oral Intake Inadequate (Acute Kidney Injury/Impairment)  Goal: Optimal Nutrition Intake  Outcome: Ongoing, Progressing     Problem: Renal Function Impairment (Acute Kidney Injury/Impairment)  Goal: Effective Renal Function  Outcome: Ongoing, Progressing     Problem: Skin Injury Risk Increased  Goal: Skin Health and Integrity  Outcome: Ongoing, Progressing     Problem: Hypertension Comorbidity  Goal: Blood Pressure in Desired Range  Outcome: Ongoing, Progressing     Problem: Impaired Wound Healing  Goal: Optimal Wound Healing  Outcome: Ongoing, Progressing     Problem: Fall Injury Risk  Goal: Absence of Fall and Fall-Related Injury  Outcome: Ongoing, Progressing     Problem: Infection  Goal: Absence of Infection Signs and Symptoms  Outcome: Ongoing, Progressing     Problem: Coping Ineffective  Goal: Effective Coping  Outcome: Ongoing, Progressing

## 2024-04-14 NOTE — PLAN OF CARE
OT / PT coeval performed 2/2 complexity and anticipated need for dual skilled staff. Per patient's son patient with hx of dementia with downtrending function ongoing, increased decline in past 2-3 weeks whereas prior to 1-2 months ago could perform short distance functional mobility, participate in partial ADLs, and more able to self-communicate needs, patient for the past 2-3 weeks requiring rotating family assist to lift patient and transfer to bedside commode, total care for LB ADLs, and moderate assist for UB ADLs with transient episodes of increased confusion and out of nature/displeasing behavior. Decline in PO intake the past 2-3 weeks. Patient also with hx of 2-3 falls in the past month.    On evaluation this date, patient cooperative and able to follow simple commands, indicating discomfort to buttocks and BLE.  Requiring moderate assist x2 therapists for supine to sit to supine transitions; did not attempt stand 2/2 pain. Unable to perform lateral scooting - did not facilitate extensive efforts to prevent buttocks shearing. Moderate assist for simple grooming ADL. Skilled acute OT to follow to support patient/ provide caregiver education / DME recommendations / maximize quality of life. Recommend palliative care consultation (patient's son amenable and seeking information upon conversation); recommend 24/7 assist, hospital bed, and lift device.    Problem: Occupational Therapy  Goal: Occupational Therapy Goal  Description: Goals to be met by: 5/12/2024     Patient will increase functional independence with ADLs by performing:    Feeding with Stand-by Assistance with simplified meal setup/ verbal cues for initiation.  Grooming with Contact Guard Assistance with verbal / tactile cues as needed.  Rolling to Bilateral with Contact Guard Assistance for pressure relief, skin integrity risk reduction, and to decreased burden of care during pericare.  Toilet transfer to bedside commode with Moderate Assistance via  least restrictive method (ie. Squat pivot).  Family reciprocation of DME recommendations to maximize quality of life, maximize patient participation, and decrease caregiver burden.  Family or caregiver 100% verbalized reciprocation of dementia friendly recommendations (ex: simplify choices; decrease visual clutter; remove hazardous objects; maximize familiarity, etc) to support patient's wellbeing and highest level of occupational engagement and participation in least restrictive discharge environment     Outcome: Ongoing, Progressing

## 2024-04-15 ENCOUNTER — TELEPHONE (OUTPATIENT)
Dept: UROLOGY | Facility: CLINIC | Age: 89
End: 2024-04-15
Payer: MEDICARE

## 2024-04-15 PROBLEM — Z51.5 PALLIATIVE CARE ENCOUNTER: Status: ACTIVE | Noted: 2024-04-15

## 2024-04-15 PROBLEM — R31.9 HEMATURIA: Status: ACTIVE | Noted: 2024-04-15

## 2024-04-15 PROBLEM — E87.1 HYPONATREMIA: Status: RESOLVED | Noted: 2024-04-14 | Resolved: 2024-04-15

## 2024-04-15 LAB
ALBUMIN SERPL BCP-MCNC: 2 G/DL (ref 3.5–5.2)
ALP SERPL-CCNC: 135 U/L (ref 55–135)
ALT SERPL W/O P-5'-P-CCNC: 23 U/L (ref 10–44)
ANION GAP SERPL CALC-SCNC: 9 MMOL/L (ref 8–16)
AST SERPL-CCNC: 33 U/L (ref 10–40)
BACTERIA UR CULT: NO GROWTH
BASOPHILS # BLD AUTO: 0.04 K/UL (ref 0–0.2)
BASOPHILS NFR BLD: 0.6 % (ref 0–1.9)
BILIRUB SERPL-MCNC: 0.5 MG/DL (ref 0.1–1)
BUN SERPL-MCNC: 23 MG/DL (ref 10–30)
CALCIUM SERPL-MCNC: 8.5 MG/DL (ref 8.7–10.5)
CHLORIDE SERPL-SCNC: 106 MMOL/L (ref 95–110)
CO2 SERPL-SCNC: 26 MMOL/L (ref 23–29)
CREAT SERPL-MCNC: 1.1 MG/DL (ref 0.5–1.4)
DIFFERENTIAL METHOD BLD: ABNORMAL
EOSINOPHIL # BLD AUTO: 0.5 K/UL (ref 0–0.5)
EOSINOPHIL NFR BLD: 6.4 % (ref 0–8)
ERYTHROCYTE [DISTWIDTH] IN BLOOD BY AUTOMATED COUNT: 14.3 % (ref 11.5–14.5)
EST. GFR  (NO RACE VARIABLE): >60 ML/MIN/1.73 M^2
GLUCOSE SERPL-MCNC: 122 MG/DL (ref 70–110)
GLUCOSE SERPL-MCNC: 166 MG/DL (ref 70–110)
HCT VFR BLD AUTO: 33.9 % (ref 40–54)
HGB BLD-MCNC: 11.1 G/DL (ref 14–18)
IMM GRANULOCYTES # BLD AUTO: 0.06 K/UL (ref 0–0.04)
IMM GRANULOCYTES NFR BLD AUTO: 0.8 % (ref 0–0.5)
LYMPHOCYTES # BLD AUTO: 1.7 K/UL (ref 1–4.8)
LYMPHOCYTES NFR BLD: 23.6 % (ref 18–48)
MAGNESIUM SERPL-MCNC: 1.8 MG/DL (ref 1.6–2.6)
MCH RBC QN AUTO: 29.6 PG (ref 27–31)
MCHC RBC AUTO-ENTMCNC: 32.7 G/DL (ref 32–36)
MCV RBC AUTO: 90 FL (ref 82–98)
MONOCYTES # BLD AUTO: 0.6 K/UL (ref 0.3–1)
MONOCYTES NFR BLD: 7.8 % (ref 4–15)
NEUTROPHILS # BLD AUTO: 4.4 K/UL (ref 1.8–7.7)
NEUTROPHILS NFR BLD: 60.8 % (ref 38–73)
NRBC BLD-RTO: 0 /100 WBC
PHOSPHATE SERPL-MCNC: 2.4 MG/DL (ref 2.7–4.5)
PLATELET # BLD AUTO: 291 K/UL (ref 150–450)
PMV BLD AUTO: 9.7 FL (ref 9.2–12.9)
POCT GLUCOSE: 117 MG/DL (ref 70–110)
POCT GLUCOSE: 147 MG/DL (ref 70–110)
POCT GLUCOSE: 166 MG/DL (ref 70–110)
POCT GLUCOSE: 170 MG/DL (ref 70–110)
POTASSIUM SERPL-SCNC: 3.8 MMOL/L (ref 3.5–5.1)
PROT SERPL-MCNC: 5.5 G/DL (ref 6–8.4)
RBC # BLD AUTO: 3.75 M/UL (ref 4.6–6.2)
SODIUM SERPL-SCNC: 141 MMOL/L (ref 136–145)
WBC # BLD AUTO: 7.16 K/UL (ref 3.9–12.7)

## 2024-04-15 PROCEDURE — 63600175 PHARM REV CODE 636 W HCPCS

## 2024-04-15 PROCEDURE — 25000003 PHARM REV CODE 250

## 2024-04-15 PROCEDURE — 80053 COMPREHEN METABOLIC PANEL: CPT | Performed by: STUDENT IN AN ORGANIZED HEALTH CARE EDUCATION/TRAINING PROGRAM

## 2024-04-15 PROCEDURE — 97530 THERAPEUTIC ACTIVITIES: CPT

## 2024-04-15 PROCEDURE — 99497 ADVNCD CARE PLAN 30 MIN: CPT | Mod: 25,,,

## 2024-04-15 PROCEDURE — 99222 1ST HOSP IP/OBS MODERATE 55: CPT | Mod: ,,, | Performed by: UROLOGY

## 2024-04-15 PROCEDURE — 99498 ADVNCD CARE PLAN ADDL 30 MIN: CPT | Mod: ,,,

## 2024-04-15 PROCEDURE — 25000003 PHARM REV CODE 250: Performed by: INTERNAL MEDICINE

## 2024-04-15 PROCEDURE — 25000003 PHARM REV CODE 250: Performed by: STUDENT IN AN ORGANIZED HEALTH CARE EDUCATION/TRAINING PROGRAM

## 2024-04-15 PROCEDURE — 63600175 PHARM REV CODE 636 W HCPCS: Performed by: INTERNAL MEDICINE

## 2024-04-15 PROCEDURE — 84100 ASSAY OF PHOSPHORUS: CPT | Performed by: STUDENT IN AN ORGANIZED HEALTH CARE EDUCATION/TRAINING PROGRAM

## 2024-04-15 PROCEDURE — 85025 COMPLETE CBC W/AUTO DIFF WBC: CPT | Performed by: STUDENT IN AN ORGANIZED HEALTH CARE EDUCATION/TRAINING PROGRAM

## 2024-04-15 PROCEDURE — 36415 COLL VENOUS BLD VENIPUNCTURE: CPT | Performed by: STUDENT IN AN ORGANIZED HEALTH CARE EDUCATION/TRAINING PROGRAM

## 2024-04-15 PROCEDURE — 93010 ELECTROCARDIOGRAM REPORT: CPT | Mod: ,,, | Performed by: INTERNAL MEDICINE

## 2024-04-15 PROCEDURE — 99223 1ST HOSP IP/OBS HIGH 75: CPT | Mod: ,,,

## 2024-04-15 PROCEDURE — 11000001 HC ACUTE MED/SURG PRIVATE ROOM

## 2024-04-15 PROCEDURE — 93005 ELECTROCARDIOGRAM TRACING: CPT

## 2024-04-15 PROCEDURE — 83735 ASSAY OF MAGNESIUM: CPT | Performed by: STUDENT IN AN ORGANIZED HEALTH CARE EDUCATION/TRAINING PROGRAM

## 2024-04-15 RX ORDER — MUPIROCIN 20 MG/G
OINTMENT TOPICAL 2 TIMES DAILY
Status: DISCONTINUED | OUTPATIENT
Start: 2024-04-15 | End: 2024-04-16 | Stop reason: HOSPADM

## 2024-04-15 RX ORDER — HYDROMORPHONE HYDROCHLORIDE 1 MG/ML
1 INJECTION, SOLUTION INTRAMUSCULAR; INTRAVENOUS; SUBCUTANEOUS ONCE
Status: COMPLETED | OUTPATIENT
Start: 2024-04-15 | End: 2024-04-15

## 2024-04-15 RX ORDER — SODIUM CHLORIDE 0.9 G/100ML
3000 IRRIGANT IRRIGATION CONTINUOUS
Status: DISCONTINUED | OUTPATIENT
Start: 2024-04-15 | End: 2024-04-16 | Stop reason: HOSPADM

## 2024-04-15 RX ORDER — OXYCODONE HYDROCHLORIDE 5 MG/1
5 TABLET ORAL ONCE AS NEEDED
Status: COMPLETED | OUTPATIENT
Start: 2024-04-15 | End: 2024-04-15

## 2024-04-15 RX ORDER — ENOXAPARIN SODIUM 100 MG/ML
40 INJECTION SUBCUTANEOUS EVERY 24 HOURS
Status: DISCONTINUED | OUTPATIENT
Start: 2024-04-15 | End: 2024-04-16 | Stop reason: HOSPADM

## 2024-04-15 RX ORDER — OXYCODONE HYDROCHLORIDE 5 MG/1
5 TABLET ORAL ONCE
Status: COMPLETED | OUTPATIENT
Start: 2024-04-15 | End: 2024-04-15

## 2024-04-15 RX ORDER — BALSAM PERU/CASTOR OIL
OINTMENT (GRAM) TOPICAL 2 TIMES DAILY
Status: DISCONTINUED | OUTPATIENT
Start: 2024-04-15 | End: 2024-04-16 | Stop reason: HOSPADM

## 2024-04-15 RX ORDER — ONDANSETRON 4 MG/1
4 TABLET, ORALLY DISINTEGRATING ORAL ONCE
Status: COMPLETED | OUTPATIENT
Start: 2024-04-15 | End: 2024-04-15

## 2024-04-15 RX ADMIN — ISOSORBIDE MONONITRATE 30 MG: 30 TABLET, EXTENDED RELEASE ORAL at 08:04

## 2024-04-15 RX ADMIN — HYDROMORPHONE HYDROCHLORIDE 1 MG: 1 INJECTION, SOLUTION INTRAMUSCULAR; INTRAVENOUS; SUBCUTANEOUS at 10:04

## 2024-04-15 RX ADMIN — TAMSULOSIN HYDROCHLORIDE 0.8 MG: 0.4 CAPSULE ORAL at 08:04

## 2024-04-15 RX ADMIN — MUPIROCIN: 20 OINTMENT TOPICAL at 10:04

## 2024-04-15 RX ADMIN — POLYETHYLENE GLYCOL 3350 17 G: 17 POWDER, FOR SOLUTION ORAL at 08:04

## 2024-04-15 RX ADMIN — LEVOTHYROXINE SODIUM 100 MCG: 100 TABLET ORAL at 05:04

## 2024-04-15 RX ADMIN — Medication: at 10:04

## 2024-04-15 RX ADMIN — SODIUM CHLORIDE 3000 ML: 900 IRRIGANT IRRIGATION at 03:04

## 2024-04-15 RX ADMIN — SODIUM CHLORIDE 3000 ML: 900 IRRIGANT IRRIGATION at 07:04

## 2024-04-15 RX ADMIN — METOPROLOL TARTRATE 50 MG: 50 TABLET, FILM COATED ORAL at 10:04

## 2024-04-15 RX ADMIN — METOPROLOL TARTRATE 50 MG: 50 TABLET, FILM COATED ORAL at 08:04

## 2024-04-15 RX ADMIN — POLYETHYLENE GLYCOL 3350 17 G: 17 POWDER, FOR SOLUTION ORAL at 10:04

## 2024-04-15 RX ADMIN — OXYCODONE 5 MG: 5 TABLET ORAL at 08:04

## 2024-04-15 RX ADMIN — ENOXAPARIN SODIUM 40 MG: 40 INJECTION SUBCUTANEOUS at 05:04

## 2024-04-15 RX ADMIN — ONDANSETRON 4 MG: 4 TABLET, ORALLY DISINTEGRATING ORAL at 12:04

## 2024-04-15 NOTE — HPI
Claude T Dupuis is a 89 y.o. male with past medical history of CAD s/p CABG 2007, dementia, HTN, HLD, PAD, venous stasis ulcers, and penile meatus stricture who presented on 4/13/2024 for abdominal pain x 1 day. Patient is admitted to [facility] for post-obstructive KANCHAN 2/2 acute urinary retention from koroma malfunction.  Koroma manipulated in the ED with increased urine output. Creatinine 1.1 this morning (baseline).  White count 7.  UA with over 100 reds, over 100 whites and many bacteria.  Urine culture pending.  No  imaging available from this admission.  Urine output with 500 cc overnight.

## 2024-04-15 NOTE — PLAN OF CARE
Recommendation:  1. Add ADA restrictions to diet.   2. Add Boost Glucose Control BID.   3. Monitor weight/labs.   4. RD to follow to monitor po intake    Goals:  Pt will tolerate diet with at least 50-75% intake at meals by RD follow up  Nutrition Goal Status: new

## 2024-04-15 NOTE — PROGRESS NOTES
Pharmacist Renal Dose Adjustment Note    Claude T Dupuis is a 89 y.o. male being treated with the medication enoxaparin    Patient Data:    Vital Signs (Most Recent):  Temp: 98.4 °F (36.9 °C) (04/15/24 0700)  Pulse: 62 (04/15/24 0700)  Resp: 18 (04/15/24 1045)  BP: (!) 155/70 (04/15/24 0700)  SpO2: 97 % (04/15/24 0700) Vital Signs (72h Range):  Temp:  [97.8 °F (36.6 °C)-98.6 °F (37 °C)]   Pulse:  [58-94]   Resp:  [16-19]   BP: (134-166)/(61-83)   SpO2:  [93 %-98 %]      Recent Labs   Lab 04/13/24  2132 04/14/24  0521 04/15/24  0408   CREATININE 2.1* 1.7* 1.1     Serum creatinine: 1.1 mg/dL 04/15/24 0408  Estimated creatinine clearance: 40.6 mL/min    Medication:Enoxaparin dose: 30mg frequency daily will be changed to medication:enoxaparin dose:40mg frequency:daily    Pharmacist's Name: Taniya Bello  Pharmacist's Extension: [phone]

## 2024-04-15 NOTE — NURSING
Care assumed for patient. Patient appears asleep,resp even and unlabored. AAOx1.  James noted with light red urine. Awaken for plan of care update.  Offered food tray. Would like to nap. This RN to continue to follow.      04/15/24 1145   Type of Frequent Check   Type Patient Rounds   Safety/Activity   Patient Rounds bed in low position;bed wheels locked;call light in patient/parent reach;clutter free environment maintained;ID band on;placement of personal items at bedside;toileting offered;visualized patient   Safety Promotion/Fall Prevention assistive device/personal item within reach;bed alarm set;Fall Risk reviewed with patient/family   Safety Precautions emergency equipment at bedside   Safety Bands on Patient Fall Risk Band   All Alarms alarm(s) activated and audible   Positioning   Body Position weight shifting;supine   Head of Bed (HOB) Positioning HOB at 20-30 degrees   Pain/Comfort/Sleep   Sleep/Rest/Relaxation appears asleep   [place] Coma Scale   Best Eye Response 4-->(E4) spontaneous   Best Motor Response 6-->(M6) obeys commands   Best Verbal Response 4-->(V4) confused   [place] Coma Scale Score 14   Assessments (Pre/Post)   Level of Consciousness (AVPU) alert

## 2024-04-15 NOTE — PT/OT/SLP PROGRESS
Occupational Therapy   Treatment    Name: Claude T Dupuis  MRN: [id number]  Admitting Diagnosis:  KANCHAN (acute kidney injury)       Recommendations:     Discharge Recommendations:  (pending d/c to hospice (at home vs facility))  Discharge Equipment Recommendations:  hospital bed, lift device    This patient requires a hospital bed due to:   Required positioning of the body in ways not feasible with an ordinary bed to alleviate pain / is completely immobile / or has limited mobility and cannot independently change their position without the use of the bed. The positioning of their body cannot be sufficiently resolved with the use of pillows and wedges. Required to reduce caregiver burden during toileting care.    Barriers to discharge:   (no barriers if d/c home with hospice with equipment)    Assessment:     Claude T Dupuis is a 89 y.o. male with a medical diagnosis of KANCHAN (acute kidney injury).  He presents with .The primary encounter diagnosis was Uropathy, obstructive. Diagnoses of Acute cystitis with hematuria, Acute urinary retention, Idiopathic hypersomnia with long sleep time, Controlled type 2 diabetes mellitus with other circulatory complication, without long-term current use of insulin, PAD (peripheral artery disease), KANCHAN (acute kidney injury), Urinary obstruction, Failure to thrive in adult, Physical deconditioning, Venous stasis ulcers of both lower extremities, Coronary artery disease involving native coronary artery of native heart without angina pectoris, Primary hypertension, Hyponatremia, and Retention of urine, unspecified were also pertinent to this visit.  . Performance deficits affecting function are weakness, impaired endurance, impaired cognition, visual deficits, impaired self care skills, impaired functional mobility, impaired balance, pain, decreased safety awareness, edema, decreased lower extremity function, impaired skin, decreased upper extremity function, decreased ROM.     Patient with  increased fatigue/ lethargy after change of koroma catheter by RN/medicine team; session emphasis this date on home DME education to reduce caregiver strain/ promote patient's occupational engagement. Recommend lift device and hospital bed.     Rehab Prognosis:   limited ; patient would benefit from acute skilled OT services to address these deficits and reach maximum level of function.       Plan:     Patient to be seen 3 x/week to address the above listed problems via self-care/home management, therapeutic activities, therapeutic exercises  Plan of Care Expires: 05/12/24  Plan of Care Reviewed with: patient, family    Subjective     Chief Complaint: nauseated  Patient/Family Comments/goals: family expresses that they have made the decision to take patient home with hospice or go to a facility with hospice and that they appreciate any and all recommendations / equipment to support this transition  Pain/Comfort:  Pain Rating 1: 0/10  Pain Addressed 1: Reposition, Pre-medicate for activity  Pain Rating Post-Intervention 1: 0/10    Objective:     Communicated with: nursing prior to session.  Patient found HOB elevated with bed alarm, koroma catheter, Other (comments) (waffle overlay) upon OT entry to room.    General Precautions: Standard, fall, hearing impaired    Orthopedic Precautions:N/A  Braces: N/A  Respiratory Status: Room air     Occupational Performance:     Bed Mobility:    Dependently repositioned from semi sideying toward R side to on back with pillow propping/ head of bed elevated       AMPA 6 Click ADL: 8    Treatment & Education:  Patient sleepy and intermittently with eyes open and with complaints of nausea - patient just received pain medication. Patient's son and additional family in room/ OT / PT provided education on recommendation for DME: hospital bed, lift device. Patient with confusion, and overall sleepiness. Educated son on use of waffle air mattress overlay use for bed as well as mattress for  chair. Educated son on strategy for positioning patient upright for self feeding to reduce aspiration risk. Therapeutic listening provided. Answered inquiries in scope.         Patient left HOB elevated with all lines intact, bed alarm on, and nursing notified    GOALS:   Multidisciplinary Problems       Occupational Therapy Goals          Problem: Occupational Therapy    Goal Priority Disciplines Outcome Interventions   Occupational Therapy Goal     OT, PT/OT Ongoing, Progressing    Description: Goals to be met by: 5/12/2024     Patient will increase functional independence with ADLs by performing:    Feeding with Stand-by Assistance with simplified meal setup/ verbal cues for initiation.  Grooming with Contact Guard Assistance with verbal / tactile cues as needed.  Rolling to Bilateral with Contact Guard Assistance for pressure relief, skin integrity risk reduction, and to decreased burden of care during pericare.  Toilet transfer to bedside commode with Moderate Assistance via least restrictive method (ie. Squat pivot).  Family reciprocation of DME recommendations to maximize quality of life, maximize patient participation, and decrease caregiver burden.  Family or caregiver 100% verbalized reciprocation of dementia friendly recommendations (ex: simplify choices; decrease visual clutter; remove hazardous objects; maximize familiarity, etc) to support patient's wellbeing and highest level of occupational engagement and participation in least restrictive discharge environment                          Time Tracking:     OT Date of Treatment: 04/15/24  OT Start Time: 1118  OT Stop Time: 1136  OT Total Time (min): 18 min    Billable Minutes:Therapeutic Activity 18 min    OT/ANNMARIE: OT          4/15/2024

## 2024-04-15 NOTE — SUBJECTIVE & OBJECTIVE
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    Hypertension     PAD (peripheral artery disease) 09/28/2022    Type 2 diabetes mellitus without complications        Past Surgical History:   Procedure Laterality Date    COLON SURGERY  2005    CORONARY ARTERY BYPASS GRAFT  2007    EYE SURGERY      VEIN BYPASS SURGERY         Review of patient's allergies indicates:   Allergen Reactions    Fluzone high-dose 2861-8199 [influenza vaccine tr-s 09 (pf)]      Had guillane barre       Family History       Problem Relation (Age of Onset)    Diabetes Mother            Tobacco Use    Smoking status: Never     Passive exposure: Never    Smokeless tobacco: Never   Substance and Sexual Activity    Alcohol use: No    Drug use: No    Sexual activity: Not Currently     Partners: Female       Review of Systems   Constitutional:  Negative for activity change, fatigue, fever and unexpected weight change.   HENT:  Negative for sore throat and trouble swallowing.    Eyes:  Negative for pain and discharge.   Respiratory:  Negative for chest tightness and shortness of breath.    Cardiovascular:  Negative for chest pain and palpitations.   Gastrointestinal:  Negative for abdominal pain, constipation, diarrhea, nausea and vomiting.   Genitourinary:         As per HPI   Musculoskeletal:  Negative for back pain and gait problem.   Skin:  Negative for rash and wound.   Neurological:  Negative for seizures and numbness.   Psychiatric/Behavioral:  Negative for hallucinations. The patient is not nervous/anxious.        Objective:     Temp:  [97.8 °F (36.6 °C)-98.5 °F (36.9 °C)] 98.5 °F (36.9 °C)  Pulse:  [58-80] 65  Resp:  [17-19] 18  SpO2:  [93 %-98 %] 97 %  BP: (138-166)/(61-83) 151/62  Weight: 79.6 kg (175 lb 7.8 oz)  Body mass index is 27.49 kg/m².           Drains       Drain  Duration                  Urethral Catheter -- days                      Physical Exam  Vitals and nursing note reviewed.   Constitutional:       Appearance: Normal appearance.   HENT:      Head: Atraumatic.      Nose: Nose normal.   Eyes:      Extraocular Movements: Extraocular movements intact.      Pupils: Pupils are equal, round, and reactive to light.   Cardiovascular:      Rate and Rhythm: Normal rate.   Pulmonary:      Effort: Pulmonary effort is normal.   Abdominal:      General: Abdomen is flat. There is no distension.      Tenderness: There is no abdominal tenderness. There is no right CVA tenderness or left CVA tenderness.   Genitourinary:     Comments: Sixteen Syriac Ramirez catheter in place with clear yellow urine in the tubing along with small blood clots  Musculoskeletal:         General: Normal range of motion.      Cervical back: Normal range of motion.   Skin:     Coloration: Skin is not jaundiced.   Neurological:      General: No focal deficit present.      Mental Status: He is alert and oriented to person, place, and time.   Psychiatric:         Mood and Affect: Mood normal.         Behavior: Behavior normal.          Significant Labs:    BMP:  Recent Labs   Lab 04/13/24 2132 04/14/24  0521 04/15/24  0408   * 138 141   K 4.4 4.2 3.8   CL 97 102 106   CO2 22* 26 26   BUN 29 27 23   CREATININE 2.1* 1.7* 1.1   CALCIUM 9.0 8.8 8.5*       CBC:  Recent Labs   Lab 04/13/24 2105 04/14/24  0521 04/15/24  0408   WBC 15.35* 15.09* 7.16   HGB 13.6* 13.1* 11.1*   HCT 39.0* 39.6* 33.9*    333 291       All pertinent labs results from the past 24 hours have been reviewed.    Significant Imaging:  All pertinent imaging results/findings from the past 24 hours have been reviewed.

## 2024-04-15 NOTE — CONSULTS
[facility] Surg  Wound Care    Patient Name:  Claude T Dupuis   MRN:  [id number]  Date: 4/15/2024  Diagnosis: KANCHAN (acute kidney injury)    History:     Past Medical History:   Diagnosis Date    Cancer     Colon cancer     Coronary artery disease     Guillain-[place] syndrome     Hx of CABG 09/07/2022    Hypertension     PAD (peripheral artery disease) 09/28/2022    Type 2 diabetes mellitus without complications        Social History     Socioeconomic History    Marital status:    Tobacco Use    Smoking status: Never     Passive exposure: Never    Smokeless tobacco: Never   Substance and Sexual Activity    Alcohol use: No    Drug use: No    Sexual activity: Not Currently     Partners: Female     Social Determinants of Health     Financial Resource Strain: Medium Risk (12/6/2023)    Overall Financial Resource Strain (CARDIA)     Difficulty of Paying Living Expenses: Somewhat hard   Food Insecurity: Food Insecurity Present (4/4/2024)    Received from [facility] [employer], [facility]    Hunger Vital Sign     Worried About Running Out of Food in the Last Year: Sometimes true     Ran Out of Food in the Last Year: Sometimes true   Transportation Needs: No Transportation Needs (4/4/2024)    Received from [facility] [employer], [facility]    PRAPARE - Transportation     Lack of Transportation (Medical): No     Lack of Transportation (Non-Medical): No   Physical Activity: Inactive (12/6/2023)    Exercise Vital Sign     Days of Exercise per Week: 0 days     Minutes of Exercise per Session: 0 min   Stress: No Stress Concern Present (12/6/2023)    [employer] [place] [employer] - Occupational Stress Questionnaire     Feeling of Stress : Not at all   Social Connections: Unknown (12/6/2023)    Social Connection and Isolation Panel [NHANES]     Frequency of Communication with Friends and Family: More than three times a week     Frequency of Social Gatherings with Friends and Family: More than three times a week     Active Member of  Clubs or Organizations: No     Attends Club or Organization Meetings: Never     Marital Status:    Housing Stability: Low Risk  (4/4/2024)    Received from [facility], [facility]    Housing Stability Vital Sign     Unable to Pay for Housing in the Last Year: No     Number of Places Lived in the Last Year: 1     In the last 12 months, was there a time when you did not have a steady place to sleep or slept in a shelter (including now)?: No       Precautions:     Allergies as of 04/13/2024 - Reviewed 04/13/2024   Allergen Reaction Noted    Fluzone high-dose [id number] [influenza vaccine tr-s 09 (pf)]  12/16/2022       WOC Assessment Details/Treatment     Pt with history of venous stasis dermatitis and PAD    LLE- scattered dry healed ulcers- eschar to L great toe- fungal toe nails      R medial lower leg- scattered dry healed ulcers  R dorsal toe- intact dry eschar        R lateral lower leg- intact clear filled blister related to edema        L heel- blanchable redness        R heel- blanchable redness        R plantar great toe- intact dry eschar        L buttock- Stage 2 pressure injury- present on admit- 1x0.5x0.1cm- partial thickness      Recommendations discussed with pt, nurse and Dr. Barraza:  - Pressure injury prevention interventions   - Betadine to toes and R lateral leg blister BID  - Moisturizer to BLE daily  - Triad ointment to L buttock BID    04/15/2024

## 2024-04-15 NOTE — ASSESSMENT & PLAN NOTE
Mr Cox is a 89 y/oM  with past medical history of CAD s/p CABG 2007, dementia, HTN, HLD, PAD, venous stasis ulcers, and penile meatus stricture who presented on 4/13/2024 for abdominal pain x 1 day related to urinary retention. At baseline, pt in lives with his wife,is recently bed/wheelchair bound, able to feed self, but otherwise dependent on ADLs. Pt recently admitted on 4/4/2023 for acute urinary retention 2/2 meatal stricture and KANCHAN. Family noted poor urine output of koroma for last 24hrs and pt began experiencing severe suprapubic pain prompting family to bring patient to ED. Staff able to manipulate koroma and pt now passing urine with relief of pain. Palliative care consulted for goals of care and end of life discussion including hospice.     -At time of initial encounter, pt resting in bed with son Anton Cox and grandson Pedro at the bedside.  Pt oriented to self and location, unsure of exact events leading up to admission.  Pt denies any pain or discomfort at this time. Majority of information obtained via son.  Pt lives at home with his wife.  Up until the last few months, pt remained quite active and involved.  Pt has drastically decline physically and mentally over the past month.  Pt with 4 ED visits within the past month as well.   Family has experienced difficulty finding medical support/ equipment to meet the pts needs at home.  -Discussed GOC moving forward. Son reports that the highest priority is making sure the pts needs are met, the pt remains free from pain and that the pt remains at home with his wife of 70 plus years if possible.  They acknowledge the pts decline and recognize that he is entering a different phase in his life.  I Introduced the topic of home hospice and what this option could offer the family.   Pts grandson familiar with hospice services as he recently had a loved one under the care of home hospice.   -Pts son agreeable to meet with home hospice representative.  JOSIE  notified and will set up meeting. Primary team notified as well.   -Pt DNR status confirmed.

## 2024-04-15 NOTE — ASSESSMENT & PLAN NOTE
- strict I's and O's  - trend creatinine  - maintain Ramirez catheter, we will irrigate later this morning  - nursing to irrigate catheter p.r.n.  - no other urologic interventions this admission  - UA with bacteria that this can be expected with indwelling Ramirez catheter, would recommend against treatment of possible UTI unless symptoms present.  - recommend goals of care conversation with family, previously declined all urologic interventions and evaluations  - please reach out with questions or concerns

## 2024-04-15 NOTE — TELEPHONE ENCOUNTER
----- Message from Suzie Angelo sent at 4/13/2024  9:36 AM CDT -----  Contact: Petty  Type:  Needs Medical Advice    Who Called: [facility]   Symptoms (please be specific): pt called 1 am this morning saying it burned when he urinates needs to knwo if you want us to collect a sample please call right back to let them know    Would the patient rather a call back or a response via [facility]? call  Best Call Back Number: [phone]  Additional Information:  states they have been over to his house 4 times this week, pt has been to the ER this week and when they Collected there was no UTI, States that pt has a 2 day Catheter in

## 2024-04-15 NOTE — PLAN OF CARE
NASIM met with pt, pt's son Anton [phone] and grandson Pedro [phone] at bedside to discuss DCA. Pt lives at home with his wife Beckie [phone]. Anton and Pedro would like the pt to d/c home with home hospice. They are intrested in speaking with Viktoriya [phone] with [facility] and [facility]  [phone]. SW called Viktoriya she stated that she would be on her way to discuss with the family. NASIM will also contact Christiano Coon.     2:52pm SW met with pt's son Anton to follow up and discuss d/c for tomorrow. Pt will d/c to home on [facility]. Anton is requesting that transport is set up around 3pm tomorrow. SW will follow up with family in the morning. Pt will have HME delivered to the home this evening. Cm will continue to follow pt through transitions of care and assist with any discharge needs.        Phi Morgan, [facility]  [phone]    Future Appointments   Date Time Provider Department Center   4/18/2024  9:00 AM Seb Gray MD [facility] IM [place]   5/1/2024  4:00 PM Aneta Olmstead PA-C [facility] WOUND [address]   8/26/2024  1:00 PM Seb Gray MD [facility] IM [place]          04/15/24 1123   Discharge Assessment   Assessment Type Discharge Planning Assessment   Confirmed/corrected address, phone number and insurance Yes   Confirmed Demographics Correct on Facesheet   Source of Information family;patient   Communicated DELFINO with patient/caregiver Yes   Reason For Admission KANCHAN   Facility Arrived From: HOME   Do you expect to return to your current living situation? Yes   Do you have help at home or someone to help you manage your care at home? Yes   Who are your caregiver(s) and their phone number(s)? son Anton [phone]   Prior to hospitilization cognitive status: Alert/Oriented   Current cognitive status: Alert/Oriented   Walking or Climbing Stairs Difficulty no   Dressing/Bathing Difficulty no   Home Accessibility wheelchair accessible   Home Layout Able to live on 1st floor   Equipment Currently  Used at Home none   Readmission within 30 days? No   Patient currently being followed by outpatient case management? No   Do you currently have service(s) that help you manage your care at home? No   Do you take prescription medications? Yes   Do you have prescription coverage? Yes   Coverage PHN   Do you have any problems affording any of your prescribed medications? No   Is the patient taking medications as prescribed? yes   Who is going to help you get home at discharge? altagracia Walton [phone]   How do you get to doctors appointments? family or friend will provide   Are you on dialysis? No   Do you take coumadin? No   Discharge Plan A Hospice/home   DME Needed Upon Discharge  none   Name(s) and Number(s) altagracia Walton [phone]

## 2024-04-15 NOTE — CONSULTS
[facility] Surg  Urology  Consult Note    Patient Name: Claude T Dupuis  MRN: [id number]  Admission Date: 4/13/2024  Hospital Length of Stay: 1   Code Status: DNR   Attending Provider: Lety Heath MD   Consulting Provider: Jase Quinteros MD  Primary Care Physician: Seb Gray MD  Principal Problem:KANCHAN (acute kidney injury)    Inpatient consult to Urology  Consult performed by: Jase Quinteros MD  Consult ordered by: Kaleb Christianson MD          Subjective:     HPI:  Claude T Dupuis is a 89 y.o. male with past medical history of CAD s/p CABG 2007, dementia, HTN, HLD, PAD, venous stasis ulcers, and penile meatus stricture who presented on 4/13/2024 for abdominal pain x 1 day. Patient is admitted to [facility] for post-obstructive KANCHAN 2/2 acute urinary retention from koroma malfunction.  Koroma manipulated in the ED with increased urine output. Creatinine 1.1 this morning (baseline).  White count 7.  UA with over 100 reds, over 100 whites and many bacteria.  Urine culture pending.  No  imaging available from this admission.  Urine output with 500 cc overnight.    Past Medical History:   Diagnosis Date    Cancer     Colon cancer     Coronary artery disease     Guillain-[place] syndrome     Hx of CABG 09/07/2022    Hypertension     PAD (peripheral artery disease) 09/28/2022    Type 2 diabetes mellitus without complications        Past Surgical History:   Procedure Laterality Date    COLON SURGERY  2005    CORONARY ARTERY BYPASS GRAFT  2007    EYE SURGERY      VEIN BYPASS SURGERY         Review of patient's allergies indicates:   Allergen Reactions    Fluzone high-dose [id number] [influenza vaccine tr-s 09 (pf)]      Had guillane barre       Family History       Problem Relation (Age of Onset)    Diabetes Mother            Tobacco Use    Smoking status: Never     Passive exposure: Never    Smokeless tobacco: Never   Substance and Sexual Activity    Alcohol use: No    Drug use: No    Sexual activity: Not Currently      Partners: Female       Review of Systems   Constitutional:  Negative for activity change, fatigue, fever and unexpected weight change.   HENT:  Negative for sore throat and trouble swallowing.    Eyes:  Negative for pain and discharge.   Respiratory:  Negative for chest tightness and shortness of breath.    Cardiovascular:  Negative for chest pain and palpitations.   Gastrointestinal:  Negative for abdominal pain, constipation, diarrhea, nausea and vomiting.   Genitourinary:         As per HPI   Musculoskeletal:  Negative for back pain and gait problem.   Skin:  Negative for rash and wound.   Neurological:  Negative for seizures and numbness.   Psychiatric/Behavioral:  Negative for hallucinations. The patient is not nervous/anxious.        Objective:     Temp:  [97.8 °F (36.6 °C)-98.5 °F (36.9 °C)] 98.5 °F (36.9 °C)  Pulse:  [58-80] 65  Resp:  [17-19] 18  SpO2:  [93 %-98 %] 97 %  BP: (138-166)/(61-83) 151/62  Weight: 79.6 kg (175 lb 7.8 oz)  Body mass index is 27.49 kg/m².           Drains       Drain  Duration                  Urethral Catheter -- days                     Physical Exam  Vitals and nursing note reviewed.   Constitutional:       Appearance: Normal appearance.   HENT:      Head: Atraumatic.      Nose: Nose normal.   Eyes:      Extraocular Movements: Extraocular movements intact.      Pupils: Pupils are equal, round, and reactive to light.   Cardiovascular:      Rate and Rhythm: Normal rate.   Pulmonary:      Effort: Pulmonary effort is normal.   Abdominal:      General: Abdomen is flat. There is no distension.      Tenderness: There is no abdominal tenderness. There is no right CVA tenderness or left CVA tenderness.   Genitourinary:     Comments: Sixteen Somali Ramirez catheter in place with clear yellow urine in the tubing along with small blood clots  Musculoskeletal:         General: Normal range of motion.      Cervical back: Normal range of motion.   Skin:     Coloration: Skin is not jaundiced.    Neurological:      General: No focal deficit present.      Mental Status: He is alert and oriented to person, place, and time.   Psychiatric:         Mood and Affect: Mood normal.         Behavior: Behavior normal.          Significant Labs:    BMP:  Recent Labs   Lab 04/13/24 2132 04/14/24  0521 04/15/24  0408   * 138 141   K 4.4 4.2 3.8   CL 97 102 106   CO2 22* 26 26   BUN 29 27 23   CREATININE 2.1* 1.7* 1.1   CALCIUM 9.0 8.8 8.5*       CBC:  Recent Labs   Lab 04/13/24 2105 04/14/24  0521 04/15/24  0408   WBC 15.35* 15.09* 7.16   HGB 13.6* 13.1* 11.1*   HCT 39.0* 39.6* 33.9*    333 291       All pertinent labs results from the past 24 hours have been reviewed.    Significant Imaging:  All pertinent imaging results/findings from the past 24 hours have been reviewed.                    Assessment and Plan:     Hematuria  - strict I's and O's  - trend creatinine  - maintain Ramirez catheter, we will irrigate later this morning  - nursing to irrigate catheter p.r.n.  - no other urologic interventions this admission  - UA with bacteria that this can be expected with indwelling Ramirez catheter, would recommend against treatment of possible UTI unless symptoms present.  - recommend goals of care conversation with family, previously declined all urologic interventions and evaluations  - please reach out with questions or concerns        VTE Risk Mitigation (From admission, onward)           Ordered     enoxaparin injection 30 mg  Daily         04/13/24 2353     IP VTE HIGH RISK PATIENT  Once         04/13/24 2301     Place sequential compression device  Until discontinued         04/13/24 2301                    Thank you for your consult.     Jase Quinteros MD  Urology  [facility] Surg

## 2024-04-15 NOTE — PROGRESS NOTES
"[facility] Medicine Progress note    Admitting Team: [facility] Hospitalist Team A  Attending Physician: Lety Heath MD  Resident: Dr. Velez    Date of Admit: 4/13/2024    Chief Complaint     Stomach pain x 1 day    Subjective:      Doing well this morning. Reports no urinary symptoms. Very thankful that his urine is flowing. Red urine noted in koroma bag.     Objective       Physical Examination:    /68 (BP Location: Right arm)   Pulse 80   Temp 98.3 °F (36.8 °C) (Oral)   Resp 16   Ht 5' 7" (1.702 m)   Wt 79.6 kg (175 lb 7.8 oz)   SpO2 95%   BMI 27.49 kg/m²      General: A&Ox3, resting comfortably in bed, poor memory, unable to recall recent events  HEENT: EOMI, moist mucus membranes w/ no erythema noted, no facial asymmetry noted  Neck: Trachea midline, no masses, no lymphadenopathy  Cardiovascular: Regular rate and rhythm, normal S1 and S2, no murmurs, rubs or gallops  Pulm: No respiratory distress  Abdomen: Soft, non-tender, nn-distended; no organomegaly  : Koroma in place with active urine flow, red  Skin: several ulcers on lower extremeties, tense bullae on R shin  Extremities: BLE ulcers, not infected appearing, pitting edema in bilatearl lower extremities            Pulses: 1+ LE pulses  Neurological: A&Ox3, 5/5 upper and lower extremity strength, face midline, smile symmetric  Psychiatric: Normal mood and affect, thought content normal    Laboratory:  Recent Labs   Lab 04/09/24  1416 04/13/24  2105 04/13/24  2132 04/14/24  0521   WBC 9.23 15.35*  --  15.09*   HGB 12.7* 13.6*  --  13.1*    315  --  333   MCV 90 87  --  90     --  133* 138   K 3.8  --  4.4 4.2     --  97 102   CO2 28  --  22* 26   BUN 12  --  29 27   CREATININE 1.0  --  2.1* 1.7*   *  --  175* 188*   CALCIUM 8.8  --  9.0 8.8   PROT 6.0  --  6.7 6.3   ALBUMIN 2.4*  --  2.4* 2.2*   PHOS  --   --   --  3.5   MG  --   --   --  1.8   AST 20  --  34 27   ALT 18  --  27 22   ALKPHOS 145*  --  173* 153* "       All laboratory data reviewed    Microbiology Data: Ucx pending    EKG Data: None    Radiology Data: CXR negative     Assessment/Plan     Claude T Dupuis is a 89 y.o. male with past medical history of CAD s/p CABG 2007, dementia, HTN, HLD, PAD, venous stasis ulcers, and penile meatus stricture who presented on 4/13/2024 for abdominal pain x 1 day. Patient is admitted to [facility] for post-obstructive KANCHAN 2/2 acute urinary retention from koroma malfunction.     Acute Urinary Retention  Indwelling koroma present on admission  -Gentle hydration with IVF, monitor for post-obstructive diuresis  -Urology consulted; recommend koroma irrigation and no antibiotics    KANCHAN, likely post-obstructive, resolved  Hematuria  Pyuria/Bacteruria  Admit Cr 2.1, elevated from 1 four days prior. UA with above findings. Pt's symptoms including elevated WBC likely stemming from acute stress response to retention. Pt currently without any pain.  -CTM, low threshold to start abx if fevers or WBC worsens  -Improving post relief of obstruction, CTM    Failure to thrive  Low Albumin  Cognitive Decline  -PT/OT ordered for functional assessment  -Discussions with case management for placement options  -Consider palliative consult pending discussions with rest of family during day    Chronic LE Ulcers  - wound care while inpatient.  - Pain management if needed  - Does not look acutely infected, holding off on abx    Hypothyroidism  -TSH 10.46 seven days prior, questionable utility due drawn during acute healthcare event  -Continue home synthroid 100mcg     Chronic Hypertension  -Continue home imdur and lopressor    DMII  A1c 7.4 on 3/2024, no home meds  -SSI while inpatient    Hx of CABG 2007, CAD  HLD  -No acute issues, stopped statin/asa due to age    Healthcare maintenance   -primary care provider is Seb Gray MD     Diet: Cardiac  VTE PPx: Lovenox  Code Status: DNR,    Dispo: pending placement    Valencia Barraza MD  [facility]/Peds PGY-1

## 2024-04-15 NOTE — NURSING
Patient with complaints of nausea. cool towel and emesis bag provided. Team paged.   Dr. Whiteside to enter orders.

## 2024-04-15 NOTE — MEDICAL/APP STUDENT
[facility] Medicine Progress Note    Primary Team: [facility] Hospitalist Team A  Attending Physician: Lety Heath MD  Resident: Dr. Velez    Date of Admit: 2024     Chief Complaint      Stomach pain x 1 day    Subjective:      Pt reports in no return of pain. Discussed nursing care/home and pt stated whatever needs to get done for his health.      Objective:     Last 24 Hour Vital Signs:  BP  Min: 140/61  Max: 166/83  Temp  Av.2 °F (36.8 °C)  Min: 97.8 °F (36.6 °C)  Max: 98.5 °F (36.9 °C)  Pulse  Av  Min: 58  Max: 69  Resp  Av.2  Min: 18  Max: 19  SpO2  Av.6 %  Min: 93 %  Max: 98 %  I/O last 3 completed shifts:  In: 2226.6 [P.O.:300; I.V.:1926.6]  Out: 4400 [Urine:4400]    Physical Examination:  General: A&Ox3, resting comfortably in bed, poor memory , unable to recall recent events, but improved mental status today  HEENT: EOMI, moist mucus membranes w/ no erythema noted, no facial asymmetry noted  Neck: Trachea midline, no masses, no lymphadenopathy  Cardiovascular: Regular rate and rhythm, normal S1 and S2, no murmurs, rubs or gallops  Pulm: CTABL, no distress  Abdomen: Soft, non-tender, non-distended; no organomegaly  : Raimrez in place with active urine flow, clear  Skin: several ulcers on lower extremeties, tense bullae on R shin  Extremities: BLE ulcers, not infected appearing, pitting edema in bilatearl lower extremities much improved.  Pulses: 1+ BLE pulses  Neurological: A&Ox3, 5/5 upper and lower extremity strength, face midline, smile symmetric  Psychiatric: Normal mood and affect, thought content normal    Laboratory:  Laboratory Data Reviewed: yes  Lab 24  1416 24  2105 24  2132 24  0521 04/15/24  04:08   WBC 9.23 15.35*  --  15.09* 7.16   HGB 12.7* 13.6*  --  13.1* 11.1    315  --  333 291   MCV 90 87  --  90 90     --  133* 138 141   K 3.8  --  4.4 4.2 3.8     --  97 102 106   CO2 28  --  22* 26 26   BUN 12  --  29 27 23    CREATININE 1.0  --  2.1* 1.7* 1.1   *  --  175* 188* 122   CALCIUM 8.8  --  9.0 8.8 8.5   PROT 6.0  --  6.7 6.3 5.5   ALBUMIN 2.4*  --  2.4* 2.2* 2.0   PHOS  --   --   --  3.5 2.4   MG  --   --   --  1.8 1.8   AST 20  --  34 27 33   ALT 18  --  27 22 23   ALKPHOS 145*  --  173* 153* 135       Microbiology Data Reviewed: yes      Other Results:  EKG none    Radiology Data Reviewed: yes  CXR:   FINDINGS:  Sternotomy wires, similar to prior.  Patient rotated to the right.  Underinflated lungs with hypoventilatory change.  Increased interstitial markings in the lungs, similar compared to prior.  Chronic elevation right hemidiaphragm.     Heart and lungs  appear unchanged when allowing for differences in technique and positioning.     Impression:  Chronic findings as above.  No significant change from prior study.     Electronically signed by:Gabriel Vieira MD  Date:                                            04/14/2024  Time:                                           02:40    Antibiotics and Day Number of Therapy:  none      Assessment:     Claude T Dupuis is a 89 y.o.male with past medical history of CAD s/p CABG 2007, dementia, HTN, HLD, PAD, venous stasis ulcers, and penile meatus stricture who presented on 4/13/2024 for abdominal pain x 1 day. Patient is admitted to [facility] for post-obstructive KANCHAN 2/2 acute urinary retention from koroma malfunction with improvement in Cr and pain for the past 24hrs.     Acute Urinary Retention  Recently discharged with indwelling koroma on 4/4/2024 secondary to acute urinary retention from meatal stricture. Required urology for placement, followed up with Dr. Guerra with plans for voiding trial with home health. Koroma manipulated by ED staff, now flowing. -No urology coverage today, consult in AM (Dr. Guerra available then)  -Gentle hydration with IVF, monitor for post-obstructive diuresis  -Urology consulted, recommend against Tx of UTI unless symptoms  present.  Will provide pain control prior to koroma flush     KANCHAN, likely post-obstructive  Hematuria  Pyuria/Bacteruria  Cr now at 1.1. Admitted at Cr 2.1. UA with above findings. Pt's previous symptoms including elevated WBC likely stemming from acute stress response to retention. Pt currently without any pain.   -Postponing Abx at this time as pt is asymptomatic, afebrile, and culture negative.  -CTM, low threshold to start abx if fevers or WBC worsens  -Improving post relief of obstruction, CTM.   -Hematuria in koroma bag this AM, unchanged from yesterday.   -Urology visit with pt this AM. Going to switch koroma to larger size after flushing      Failure to thrive  Low Albumin  Cognitive Decline  Per Son, patient has had a notable physical and mental decline within the last month. Was ambulating with a walker roughly a month ago however now is bound to wheelchair 2/2 to knee pain and generalized fatigue. Notes his episodes of confusion have been increasing and he requires frequent re-orientation. Appears to have a >1month decline across several domains. Son is concerned that family is unable to provide the level of care he requires  -PT/OT ordered for functional assessment  -Discussions with case management for placement options  -Consider palliative consult pending discussions with rest of family during day  -Discussed nursing care/home and pt had no reservations at this time     Chronic LE Ulcers  Hx of chronic LE ulcers from PAD and venous insufficiency. Toe pressures in 2022 with severe arterial disease. Has frequent wound care, non appear infected  -wound care while inpatient.  - Pain management if needed  - Does not look acutely infected, holding off on abx, b/l compression boot currently on and edema much improved      Hypothyroidism  -TSH 10.46 eight days prior, questionable utility due drawn during acute healthcare event  -Continue home synthroid 100mcg      Chronic Hypertension  -Continue home imdur and  lopressor     DMII  A1c 7.4 on 3/2024, no home meds  -SSI while inpatient     Hx of CABG 2007, CAD  HLD  -No acute issues, stopped statin/asa due to age     Healthcare maintenance   -primary care provider is Seb Gray MD      Diet: Cardiac  VTE PPx: Lovenox  Code Status: DNR, verified with son at bedside.     Dispo: admit for urology consultation  Estimated LOS: 1-2 days.       CHESTER Fierro-S3  [facility]

## 2024-04-15 NOTE — PLAN OF CARE
Medical Readiness for Discharge  Medically ready is not yet set.  Expected Discharge: 4/15/2024Afternoon  Expected Discharge Comment:DC +/-. Family is noted to have signed with hospice. Pending DME and orders. Assigned CM to finalize DC.

## 2024-04-15 NOTE — PT/OT/SLP PROGRESS
Physical Therapy Treatment    Patient Name:  Claude T Dupuis   MRN:  [id number]    Recommendations:     Discharge Recommendations:  (Patient to have Hospice - Home vs. Facility)  Discharge Equipment Recommendations: hospital bed, lift device  Barriers to discharge: None    Assessment:     Claude T Dupuis is a 89 y.o. male admitted with a medical diagnosis of KANCHAN (acute kidney injury).  He presents with the following impairments/functional limitations: weakness, gait instability, decreased upper extremity function, impaired cardiopulmonary response to activity, impaired endurance, impaired balance, decreased lower extremity function, decreased safety awareness, impaired self care skills, impaired cognition, impaired functional mobility     Patient with fatigue after change of koroma catheter and administration of pain meds.  Son and family member present.  Educated on DME for home use - hospital bed and lift device.  Also educated on use of air mattress for bed and chair.  Family educated on role of hospice in home setting.  Patient with nausea, agreed to being repositioned in bed, requires total assist.  Nursing notified of nausea.  Patient remains with confusion, thinking therapist is his granddaughter.  .    Rehab Prognosis: Fair; patient would benefit from acute skilled PT services to address these deficits and reach maximum level of function.    Recent Surgery: * No surgery found *      Plan:     During this hospitalization, patient to be seen 3 x/week to address the identified rehab impairments via therapeutic activities, therapeutic exercises, neuromuscular re-education, wheelchair management/training and progress toward the following goals:    Plan of Care Expires:  05/12/24    Subjective     Chief Complaint: complains of nausea, nursing notified  Patient/Family Comments/goals: to return to PLOF  Pain/Comfort:  Pain Rating 1: 0/10  Pain Rating Post-Intervention 1: 0/10      Objective:     Communicated with nurse  LaTyra prior to session.  Patient found HOB elevated with bed alarm, koroma catheter, peripheral IV upon PT entry to room.     General Precautions: Standard, fall, hearing impaired  Orthopedic Precautions: N/A  Braces: N/A  Respiratory Status: Room air     Functional Mobility:  Bed Mobility:     Scooting: total assistance to reposition in bed      AM-PAC 6 CLICK MOBILITY  Turning over in bed (including adjusting bedclothes, sheets and blankets)?: 1  Sitting down on and standing up from a chair with arms (e.g., wheelchair, bedside commode, etc.): 1  Moving from lying on back to sitting on the side of the bed?: 1  Moving to and from a bed to a chair (including a wheelchair)?: 1  Need to walk in hospital room?: 1  Climbing 3-5 steps with a railing?: 1  Basic Mobility Total Score: 6       Treatment & Education:  Patient thinks therapist is granddaughter.  Patient complains of nausea, had just been given Dilaudid.  Patient's son and family member educated on use of hospital bed and Tobias lift.  Educated on use of air mattress for home. Patient educated on role of hospice.  Family supportive and agree on use of hospital bed and Tobias should they return home.    Patient left HOB elevated with all lines intact, call button in reach, bed alarm on, and nurse notified..    GOALS:   Multidisciplinary Problems       Physical Therapy Goals          Problem: Physical Therapy    Goal Priority Disciplines Outcome Goal Variances Interventions   Physical Therapy Goal     PT, PT/OT Ongoing, Progressing     Description: Physical therapy goals to be met by 5/12/2024:  1. Pt will complete bed mobility with min assist from caregivers.  2. Pt will complete supine <>sit transfers with min assist from caregivers.  3. Pt will complete sit to stand transfer with mod A with rolling walker.  4. Pt will complete stand pivot transfer from bed to wheelchair or bedside chair with mod A x 2 with rolling walker.                       Time Tracking:      PT Received On: 04/15/24  PT Start Time: 1118     PT Stop Time: 1136  PT Total Time (min): 18 min     Billable Minutes: Therapeutic Activity 18    Treatment Type: Treatment  PT/PTA: PT     Number of PTA visits since last PT visit: 0     04/15/2024

## 2024-04-15 NOTE — CONSULTS
"  [facility] - Med Surg  Adult Nutrition  Consult Note    SUMMARY     Recommendations    Recommendation:  1. Add ADA restrictions to diet.   2. Add Boost Glucose Control BID.   3. Monitor weight/labs.   4. RD to follow to monitor po intake    Goals:  Pt will tolerate diet with at least 50-75% intake at meals by RD follow up  Nutrition Goal Status: new  Communication of RD Recs: reviewed with RN    Assessment and Plan  Nutrition Problem  Inadequate energy intake    Related to (etiology):   Hx/advanced age    Signs and Symptoms (as evidenced by):   Weight loss     Interventions:  Collaboration with other providers  Systancia beverage    Nutrition Diagnosis Status:   New      Malnutrition Assessment  Weight Loss (Malnutrition):  (4% x 6 months)      Reason for Assessment  Reason For Assessment: consult (weight loss)  Diagnosis:  (KANCHAN)  Relevant Medical History: colon cancer, HTN, CAD, PAD, CABG, 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 Pt on Cardiac diet. Noted 9lb weight loss x 6 months. Elliott 14- R leg, L foot, L leg wounds. Unable to assess NFPE at this time  Nutrition Discharge Planning: pt to d/c on Cardiac ADA diet    Nutrition Risk Screen  Nutrition Risk Screen: no indicators present    Nutrition/Diet History  Food Preferences: no Christian or cultural food prefs identified  Factors Affecting Nutritional Intake: None identified at this time    Anthropometrics  Temp: 98.4 °F (36.9 °C)  Height Method: Stated  Height: 5' 7" (170.2 cm)  Height (inches): 67 in  Weight Method: Bed Scale  Weight: 79.6 kg (175 lb 7.8 oz)  Weight (lb): 175.49 lb  Ideal Body Weight (IBW), Male: 148 lb  % Ideal Body Weight, Male (lb): 118.57 %  BMI (Calculated): 27.5  BMI Grade: 25 - 29.9 - overweight  Usual Body Weight (UBW), k.5 kg (10/5)  % Usual Body Weight: 95.53  % Weight Change From Usual Weight: -4.67 %     Lab/Procedures/Meds  Pertinent Labs Reviewed: reviewed  Pertinent Labs " Comments: Glu 122H, Ca 8.5L, Phos 2.4L, Alb 2.0L  Pertinent Medications Reviewed: reviewed  Pertinent Medications Comments: enoxaparin, lopressor    Estimated/Assessed Needs  Weight Used For Calorie Calculations: 67.2 kg (148 lb 2.4 oz) (IBW)  Energy Calorie Requirements (kcal): 2151-9149 (25-30 kcal/kg)  Energy Need Method: Kcal/kg  Protein Requirements: 67g (1.0g/kg)  Weight Used For Protein Calculations: 67.2 kg (148 lb 2.4 oz) (IBW)  Estimated Fluid Requirement Method: RDA Method  RDA Method (mL): 1680  CHO Requirement: 180g    Nutrition Prescription Ordered  Current Diet Order: Cardiac    Evaluation of Received Nutrient/Fluid Intake  I/O: 1916/1450  Energy Calories Required: not meeting needs  Protein Required: not meeting needs  Fluid Required: not meeting needs  Comments: LBM 4/14  % Intake of Estimated Energy Needs: Other: intake not recorded  % Meal Intake: Other: intake not recorded    Nutrition Risk  Level of Risk/Frequency of Follow-up:  (2xweekly)     Monitor and Evaluation  Food and Nutrient Intake: food and beverage intake  Food and Nutrient Adminstration: diet order  Physical Activity and Function: nutrition-related ADLs and IADLs  Anthropometric Measurements: weight  Biochemical Data, Medical Tests and Procedures: electrolyte and renal panel  Nutrition-Focused Physical Findings: overall appearance     Nutrition Related Social Determinants of Health: SDOH: Unable to assess at this time.      Nutrition Follow-Up  RD Follow-up?: Yes

## 2024-04-15 NOTE — SUBJECTIVE & OBJECTIVE
Interval History: Ramirez draining, no current issues per pt. GOC discussed with family.  Pt to discharge home under the care of home hospice services.     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    Hypertension     PAD (peripheral artery disease) 09/28/2022    Type 2 diabetes mellitus without complications        Past Surgical History:   Procedure Laterality Date    COLON SURGERY  2005    CORONARY ARTERY BYPASS GRAFT  2007    EYE SURGERY      VEIN BYPASS SURGERY         Review of patient's allergies indicates:   Allergen Reactions    Fluzone high-dose 7326-9028 [influenza vaccine tr-s 09 (pf)]      Had guillane barre       Medications:  Continuous Infusions:  Current Facility-Administered Medications   Medication Dose Route Frequency Provider Last Rate Last Admin    dextrose 10% bolus 125 mL 125 mL  12.5 g Intravenous PRN Kaleb Christianson MD        dextrose 10% bolus 125 mL 125 mL  12.5 g Intravenous PRN Kaleb Christianson MD        dextrose 10% bolus 250 mL 250 mL  25 g Intravenous PRN Kaleb Christianson MD        dextrose 10% bolus 250 mL 250 mL  25 g Intravenous PRN Kaleb Christianson MD        enoxaparin injection 40 mg  40 mg Subcutaneous Daily Lety Heath MD        glucagon (human recombinant) injection 1 mg  1 mg Intramuscular PRKaleb Ennis MD        glucagon (human recombinant) injection 1 mg  1 mg Intramuscular PRKaleb Ennis MD        glucose chewable tablet 16 g  16 g Oral PRN Kaleb Christianson MD        glucose chewable tablet 16 g  16 g Oral PRN Kaleb Christianson MD        glucose chewable tablet 24 g  24 g Oral PRKaleb Ennis MD        glucose chewable tablet 24 g  24 g Oral PRN Kaleb Christianson MD        insulin aspart U-100 pen 0-5 Units  0-5 Units Subcutaneous QID (AC + HS) PRKaleb Ennis MD        isosorbide mononitrate 24 hr tablet 30 mg  30 mg Oral Daily Kaleb Christianson MD   30 mg at 04/15/24 0827    levothyroxine tablet 100  mcg  100 mcg Oral Before breakfast Kaleb Christianson MD   100 mcg at 04/15/24 0522    metoprolol tartrate (LOPRESSOR) tablet 50 mg  50 mg Oral BID Kaleb Christianson MD   50 mg at 04/15/24 0828    naloxone 0.4 mg/mL injection 0.02 mg  0.02 mg Intravenous PRN Kaleb Christianson MD        polyethylene glycol packet 17 g  17 g Oral BID Valencia Barraza MD   17 g at 04/15/24 0828    sodium chloride 0.9% flush 10 mL  10 mL Intravenous Q12H PRN Kaleb Christianson MD        tamsulosin 24 hr capsule 0.8 mg  2 capsule Oral Daily Kaleb Christianson MD   0.8 mg at 04/15/24 0827     Scheduled Meds:  Current Facility-Administered Medications   Medication Dose Route Frequency Provider Last Rate Last Admin    dextrose 10% bolus 125 mL 125 mL  12.5 g Intravenous PRKaleb Ennis MD        dextrose 10% bolus 125 mL 125 mL  12.5 g Intravenous PRN Kaleb Christianson MD        dextrose 10% bolus 250 mL 250 mL  25 g Intravenous PRN Kaleb Christianson MD        dextrose 10% bolus 250 mL 250 mL  25 g Intravenous PRN Kaleb Christianson MD        enoxaparin injection 40 mg  40 mg Subcutaneous Daily Lety Heath MD        glucagon (human recombinant) injection 1 mg  1 mg Intramuscular PRKaleb Ennis MD        glucagon (human recombinant) injection 1 mg  1 mg Intramuscular PRKaleb Ennis MD        glucose chewable tablet 16 g  16 g Oral PRN Kaleb Christianson MD        glucose chewable tablet 16 g  16 g Oral PRN Kaleb Christianson MD        glucose chewable tablet 24 g  24 g Oral PRN Kaleb Christianson MD        glucose chewable tablet 24 g  24 g Oral PRN Kaleb Christianson MD        insulin aspart U-100 pen 0-5 Units  0-5 Units Subcutaneous QID (AC + HS) Kaleb Watson MD        isosorbide mononitrate 24 hr tablet 30 mg  30 mg Oral Daily Kaleb Christianson MD   30 mg at 04/15/24 0827    levothyroxine tablet 100 mcg  100 mcg Oral Before breakfast Kaleb Christianson MD   100 mcg at 04/15/24 0522    metoprolol tartrate (LOPRESSOR) tablet 50 mg  50 mg Oral BID Kaleb Christianson MD   50 mg at 04/15/24 0828    naloxone 0.4 mg/mL injection 0.02  mg  0.02 mg Intravenous PRN Kaleb Christianson MD        polyethylene glycol packet 17 g  17 g Oral BID Valencia Barraza MD   17 g at 04/15/24 0828    sodium chloride 0.9% flush 10 mL  10 mL Intravenous Q12H PRN Kaleb Christianson MD        tamsulosin 24 hr capsule 0.8 mg  2 capsule Oral Daily Kaleb Christianson MD   0.8 mg at 04/15/24 0827     PRN Meds:  Current Facility-Administered Medications   Medication Dose Route Frequency Provider Last Rate Last Admin    dextrose 10% bolus 125 mL 125 mL  12.5 g Intravenous PRKaleb Ennis MD        dextrose 10% bolus 125 mL 125 mL  12.5 g Intravenous Kaleb Watson MD        dextrose 10% bolus 250 mL 250 mL  25 g Intravenous PRN Kaleb Christianson MD        dextrose 10% bolus 250 mL 250 mL  25 g Intravenous PRN Kaleb Christianson MD        enoxaparin injection 40 mg  40 mg Subcutaneous Daily Lety Heath MD        glucagon (human recombinant) injection 1 mg  1 mg Intramuscular PRN Kaleb Christianson MD        glucagon (human recombinant) injection 1 mg  1 mg Intramuscular PRN Kaleb Christianson MD        glucose chewable tablet 16 g  16 g Oral PRN Kaleb Christianson MD        glucose chewable tablet 16 g  16 g Oral PRN Kaleb Christianson MD        glucose chewable tablet 24 g  24 g Oral PRN Kaleb Christianson MD        glucose chewable tablet 24 g  24 g Oral PRN Kaleb Christianson MD        insulin aspart U-100 pen 0-5 Units  0-5 Units Subcutaneous QID (AC + HS) PRKaleb Ennis MD        isosorbide mononitrate 24 hr tablet 30 mg  30 mg Oral Daily Kaleb Christianson MD   30 mg at 04/15/24 0827    levothyroxine tablet 100 mcg  100 mcg Oral Before breakfast Kaleb Christianson MD   100 mcg at 04/15/24 0522    metoprolol tartrate (LOPRESSOR) tablet 50 mg  50 mg Oral BID Kaleb Christianson MD   50 mg at 04/15/24 0828    naloxone 0.4 mg/mL injection 0.02 mg  0.02 mg Intravenous PRN Kaleb Christianson MD        polyethylene glycol packet 17 g  17 g Oral BID Valencia Barraza MD   17 g at 04/15/24 0828    sodium chloride 0.9% flush 10 mL  10 mL Intravenous Q12H PRN Kaleb Christianson MD         tamsulosin 24 hr capsule 0.8 mg  2 capsule Oral Daily Kaleb Christianson MD   0.8 mg at 04/15/24 0827       Family History       Problem Relation (Age of Onset)    Diabetes Mother          Tobacco Use    Smoking status: Never     Passive exposure: Never    Smokeless tobacco: Never   Substance and Sexual Activity    Alcohol use: No    Drug use: No    Sexual activity: Not Currently     Partners: Female       Review of Systems   Unable to perform ROS: Dementia   Genitourinary:  Positive for difficulty urinating (resolved).     Objective:     Vital Signs (Most Recent):  Temp: 98.4 °F (36.9 °C) (04/15/24 1300)  Pulse: 61 (04/15/24 1300)  Resp: 20 (04/15/24 1300)  BP: (!) 195/78 (04/15/24 1300)  SpO2: (!) 94 % (04/15/24 1300) Vital Signs (24h Range):  Temp:  [97.8 °F (36.6 °C)-98.5 °F (36.9 °C)] 98.4 °F (36.9 °C)  Pulse:  [58-69] 61  Resp:  [16-20] 20  SpO2:  [93 %-97 %] 94 %  BP: (140-195)/(61-83) 195/78     Weight: 79.6 kg (175 lb 7.8 oz)  Body mass index is 27.49 kg/m².       Physical Exam  Vitals and nursing note reviewed. Exam conducted with a chaperone present.   Constitutional:       General: He is not in acute distress.     Appearance: He is obese. He is ill-appearing (chronically).   HENT:      Head: Normocephalic.      Mouth/Throat:      Mouth: Mucous membranes are moist.   Cardiovascular:      Rate and Rhythm: Normal rate.   Pulmonary:      Effort: Pulmonary effort is normal.   Genitourinary:     Comments: Ramirez, light pink urine   Musculoskeletal:         General: No signs of injury.      Right lower leg: Edema present.      Left lower leg: Edema present.   Skin:     General: Skin is warm and dry.      Findings: Lesion and rash present. Rash is crusting (to head/ neck).      Comments: See wound care photos,     Neurological:      Mental Status: He is alert. He is disoriented.      Motor: Weakness present.   Psychiatric:         Attention and Perception: Attention normal.         Mood and Affect: Mood normal.          Speech: Speech normal.         Cognition and Memory: Cognition is impaired. Memory is impaired. He exhibits impaired recent memory and impaired remote memory.            Review of Symptoms      Symptom Assessment (ESAS 0-10 Scale)  Pain:  0  Dyspnea:  0  Anxiety:  0  Nausea:  0  Depression:  0  Anorexia:  0  Fatigue:  0  Insomnia:  0  Restlessness:  0  Agitation:  0         Performance Status:  30    Living Arrangements:  Lives with spouse and Lives in home    Psychosocial/Cultural:   See Palliative Psychosocial Note: No  Social Issues Identified: Coping deficit pt/family and New Diagnosis/Trauma  Bereavement Risk: No  Caregiver Needs Discussed. Caregiver Distress: Yes: Issues of guilt, Intensity of family caregiving, and Caregiver Burnout Risk  Cultural: none identified   **Primary  to Follow**  Palliative Care  Consult: No    Spiritual:  F - Nataly and Belief:  Caodaism      Time-Based Charting:  Yes  Chart Review: 22 minutes  Face to Face: 29 minutes  Symptom Assessment: 12 minutes  Coordination of Care: 10 minutes  Discharge Plannin minutes  Advance Care Plannin minutes  Goals of Care: 30 minutes    Total Time Spent: 130 minutes        Advance Care Planning   Advance Directives:   Living Will: No    [place]: No    Do Not Resuscitate Status: Yes    Medical Power of : No      Decision Making:  Patient answered questions and Family answered questions  Goals of Care: The patient and family endorses that what is most important right now is to focus on spending time at home, avoiding the hospital, remaining as independent as possible, symptom/pain control, quality of life, even if it means sacrificing a little time, improvement in condition but with limits to invasive therapies, and comfort and QOL     Accordingly, we have decided that the best plan to meet the patient's goals includes enrolling in hospice care at time of discharge          Significant Labs: All pertinent labs  "within the past 24 hours have been reviewed.  CBC:   Recent Labs   Lab 04/15/24  0408   WBC 7.16   HGB 11.1*   HCT 33.9*   MCV 90        BMP:  Recent Labs   Lab 04/15/24  0408   *      K 3.8      CO2 26   BUN 23   CREATININE 1.1   CALCIUM 8.5*   MG 1.8     LFT:  Lab Results   Component Value Date    AST 33 04/15/2024    ALKPHOS 135 04/15/2024    BILITOT 0.5 04/15/2024     Albumin:   Albumin   Date Value Ref Range Status   04/15/2024 2.0 (L) 3.5 - 5.2 g/dL Final     Protein:   Total Protein   Date Value Ref Range Status   04/15/2024 5.5 (L) 6.0 - 8.4 g/dL Final     Lactic acid:   No results found for: "LACTATE"    Significant Imaging: I have reviewed all pertinent imaging results/findings within the past 24 hours.    "

## 2024-04-15 NOTE — PLAN OF CARE
Patient with fatigue after change of koroma catheter and administration of pain meds.  Son and nephew present.  Educated on DME for home use - hospital bed and lift device.  Also educated on use of air mattress for bed and chair.  Family educated on role of hospice in home setting.  Patient with nausea, agreed to being repositioned in bed, requires total assist.  Nursing notified of nausea.  Patient remains with confusion, thinking therapist is his granddaughter.      Problem: Physical Therapy  Goal: Physical Therapy Goal  Description: Physical therapy goals to be met by 5/12/2024:  1. Pt will complete bed mobility with min assist from caregivers.  2. Pt will complete supine <>sit transfers with min assist from caregivers.  3. Pt will complete sit to stand transfer with mod A with rolling walker.  4. Pt will complete stand pivot transfer from bed to wheelchair or bedside chair with mod A x 2 with rolling walker.  Outcome: Ongoing, Progressing

## 2024-04-15 NOTE — PROGRESS NOTES
[facility]  Department of Hospital Medicine  [address] [place] [address]  [phone] after hours  [phone] fax    HOSPICE  ORDERS    04/16/2024    Admit to Hospice:  Home Service Inpatient Service (Patient's home with [facility])    Diagnoses:   Active Hospital Problems    Diagnosis  POA    Hematuria [R31.9]  Yes    Palliative care encounter [Z51.5]  Not Applicable    Failure to thrive in adult [R62.7]  Yes    Physical deconditioning [R53.81]  Yes    HLD (hyperlipidemia) [E78.5]  Yes    Venous stasis ulcers of both lower extremities [I83.019, I83.029, L97.919, L97.929]  Yes    Hypothyroid [E03.9]  Yes    Coronary artery disease [I25.10]  Yes    Hx of CABG [Z95.1]  Not Applicable    Venous insufficiency of left lower extremity [I87.2]  Yes    Ulcer of left lower extremity with fat layer exposed [L97.922]  Yes    Primary hypertension [I10]  Yes     Chronic      Resolved Hospital Problems    Diagnosis Date Resolved POA    *KANCHAN (acute kidney injury) [N17.9] 04/16/2024 Yes    Acute urinary retention [R33.8] 04/16/2024 Yes    Hyponatremia [E87.1] 04/15/2024 Yes    Urinary obstruction [N13.9] 04/16/2024 Yes       Hospice Qualifying Diagnoses:       Patient has a life expectancy < 6 months due to:  Primary Hospice Diagnosis: Dementia, rapid debility  Comorbid Conditions Contributing to Decline:  CAD s/p CABG, HTN, HLD, PAD, venous stasis ulcers, penile meatus stricture requiring koroma    Vital Signs: Routine per Hospice Protocol.    Code Status: DNR    Allergies:   Review of patient's allergies indicates:   Allergen Reactions    Fluzone high-dose [id number] [influenza vaccine tr-s 09 (pf)]      Had guillane barre       Diet: Cardiac    Activities: As tolerated    Goals of Care Treatment Preferences:  Code Status: DNR          What is most important right now is to focus on spending time at home, avoiding the hospital, remaining as independent as possible,  symptom/pain control, quality of life, even if it means sacrificing a little time, improvement in condition but with limits to invasive therapies, comfort and QOL .  Accordingly, we have decided that the best plan to meet the patient's goals includes enrolling in hospice care.      Nursing: Per Hospice Routine    Ramirez Care: Empty Ramirez bag Q shift and PRN.  Change Ramirez every month.    Routine Skin for Bedridden Patients: Apply moisture barrier cream to all skin folds and   wet areas in perineal area daily and after baths and all bowel movements.    Oxygen: N/A    Other Miscellaneous Care: Wound Care for chronic lower leg ulcers    Medications:        Medication List        CONTINUE taking these medications      acetaminophen 80 MG Supp  Commonly known as: TYLENOL  Place 80 mg rectally.     atorvastatin 40 MG tablet  Commonly known as: LIPITOR  Take 1 tablet (40 mg total) by mouth once daily.     isosorbide mononitrate 30 MG 24 hr tablet  Commonly known as: IMDUR  Take 1 tablet (30 mg total) by mouth once daily.     latanoprost 0.005 % ophthalmic solution  Place 1 drop into both eyes every evening.     levothyroxine 100 MCG tablet  Commonly known as: SYNTHROID  Take 1 tablet (100 mcg total) by mouth before breakfast.     metoprolol tartrate 50 MG tablet  Commonly known as: LOPRESSOR  Take 50 mg by mouth 2 (two) times daily.     tamsulosin 0.4 mg Cap  Commonly known as: FLOMAX  Take 2 capsules by mouth.            STOP taking these medications      HYDROcodone-acetaminophen 5-325 mg per tablet  Commonly known as: NORCO            DIABETES CARE:  Nurse to perform and educate diabetic management with blood glucose monitoring:      Fingerstick blood sugar AC and HS    Report CBG < 60 or > 350 to physician.         Insulin Sliding Scale         Glucose  Novolog Insulin Subcutaneous        0 - 60   Orange juice or glucose tablet      No insulin   201-250  2 units   251-300  4 units   301-350  6 units   351-400  8  units   >400   10 units then call physician      Future Orders:  Hospice Medical Director may dictate new orders for comfortable care measures & sign death certificate.    _________________________________  Valencia Barraza MD  04/16/2024

## 2024-04-15 NOTE — CONSULTS
[facility] Surg  Palliative Medicine  Consult Note    Patient Name: Claude T Dupuis  MRN: [id number]  Admission Date: 4/13/2024  Hospital Length of Stay: 1 days  Code Status: DNR   Attending Provider: Lety Heath MD  Consulting Provider: Mirlande Harrell NP  Primary Care Physician: Seb Gray MD  Principal Problem:KANCHAN (acute kidney injury)    Patient information was obtained from patient, relative(s), past medical records, and primary team.      Inpatient consult to Palliative Care  Consult performed by: Mirlande Harrell NP  Consult ordered by: Valencia Barraza MD  Reason for consult: Goals of care/ end of life including hospcie care        Assessment/Plan:     Palliative Care  Palliative care encounter  Mr Cox is a 89 y/oM  with past medical history of CAD s/p CABG 2007, dementia, HTN, HLD, PAD, venous stasis ulcers, and penile meatus stricture who presented on 4/13/2024 for abdominal pain x 1 day related to urinary retention. At baseline, pt in lives with his wife,is recently bed/wheelchair bound, able to feed self, but otherwise dependent on ADLs. Pt recently admitted on 4/4/2023 for acute urinary retention 2/2 meatal stricture and KANCHAN. Family noted poor urine output of koroma for last 24hrs and pt began experiencing severe suprapubic pain prompting family to bring patient to ED. Staff able to manipulate koroma and pt now passing urine with relief of pain. Palliative care consulted for goals of care and end of life discussion including hospice.     -At time of initial encounter, pt resting in bed with son Anton Cox and grandson Pedro at the bedside.  Pt oriented to self and location, unsure of exact events leading up to admission.  Pt denies any pain or discomfort at this time. Majority of information obtained via son.  Pt lives at home with his wife.  Up until the last few months, pt remained quite active and involved.  Pt has drastically decline physically and mentally over the past month.  Pt with 4 ED  "visits within the past month as well.   Family has experienced difficulty finding medical support/ equipment to meet the pts needs at home.  -Discussed GOC moving forward. Son reports that the highest priority is making sure the pts needs are met, the pt remains free from pain and that the pt remains at home with his wife of 70 plus years if possible.  They acknowledge the pts decline and recognize that he is entering a different phase in his life.  I Introduced the topic of home hospice and what this option could offer the family.   Pts grandson familiar with hospice services as he recently had a loved one under the care of home hospice.   -Pts son agreeable to meet with home hospice representative.  CM notified and will set up meeting. Primary team notified as well.   -Pt DNR status confirmed.    -Per update, family has signed consents with home hospice company.       Thank you for your consult.     Subjective:     HPI:   Per chart, "Claude T Dupuis is a 89 y.o. male with past medical history of CAD s/p CABG 2007, dementia, HTN, HLD, PAD, venous stasis ulcers, and penile meatus stricture who presented on 4/13/2024 for abdominal pain x 1 day.     Of note, patient with dementia and poor memory. HPI primarily attained through son at bedside and EMR review.     The patient was in their usual state of health which includes living with family, bed/wheelchair bound, able to feed self, but otherwise dependent on ADLs. Pt recently admitted on 4/4/2023 for acute urinary retention 2/2 meatal stricture and GEORGE. George improved and patient discharge with koroma and plans to perform voiding trial through home health. Family noted poor urine output of koroma for last 24hrs and pt began experiencing severe suprapubic pain prompting family to bring patient to ED. Found to have GEORGE and active urinary retention in ED. Staff able to manipulate koroma and pt now passing urine with relief of pain. [facility] consulted for admission due to " "post-obstructive KANCHAN.     On exam, pt comfortable appearing without focal complaints. Denies any associated fevers, chills, nausea, or vomiting."       Interval History: Ramirez draining, no current issues per pt. GOC discussed with family.  Pt to discharge home under the care of home hospice services.     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     Hx of CABG 09/07/2022    Hypertension     PAD (peripheral artery disease) 09/28/2022    Type 2 diabetes mellitus without complications        Past Surgical History:   Procedure Laterality Date    COLON SURGERY  2005    CORONARY ARTERY BYPASS GRAFT  2007    EYE SURGERY      VEIN BYPASS SURGERY         Review of patient's allergies indicates:   Allergen Reactions    Fluzone high-dose [id number] [influenza vaccine tr-s 09 (pf)]      Had guillane barre       Medications:  Continuous Infusions:  Current Facility-Administered Medications   Medication Dose Route Frequency Provider Last Rate Last Admin    dextrose 10% bolus 125 mL 125 mL  12.5 g Intravenous PRN Kaleb Christianson MD        dextrose 10% bolus 125 mL 125 mL  12.5 g Intravenous PRN Kaleb Christianson MD        dextrose 10% bolus 250 mL 250 mL  25 g Intravenous PRN Kaleb Christianson MD        dextrose 10% bolus 250 mL 250 mL  25 g Intravenous PRN Kaleb Christianson MD        enoxaparin injection 40 mg  40 mg Subcutaneous Daily Lety Heath MD        glucagon (human recombinant) injection 1 mg  1 mg Intramuscular PRKaleb Ennis MD        glucagon (human recombinant) injection 1 mg  1 mg Intramuscular PRKaleb Ennis MD        glucose chewable tablet 16 g  16 g Oral Kaleb Watson MD        glucose chewable tablet 16 g  16 g Oral PRN Kaleb Christianson MD        glucose chewable tablet 24 g  24 g Oral PRN Kaleb Christianson MD        glucose chewable tablet 24 g  24 g Oral PRN Kaleb Christianson MD        insulin aspart U-100 pen 0-5 Units  0-5 Units Subcutaneous QID (AC + HS) " Kaleb Watson MD        isosorbide mononitrate 24 hr tablet 30 mg  30 mg Oral Daily Kaleb Christianson MD   30 mg at 04/15/24 0827    levothyroxine tablet 100 mcg  100 mcg Oral Before breakfast Kaleb Christianson MD   100 mcg at 04/15/24 0522    metoprolol tartrate (LOPRESSOR) tablet 50 mg  50 mg Oral BID Kaleb Christianson MD   50 mg at 04/15/24 0828    naloxone 0.4 mg/mL injection 0.02 mg  0.02 mg Intravenous PRN Kaleb Christianson MD        polyethylene glycol packet 17 g  17 g Oral BID Valencia Barraza MD   17 g at 04/15/24 0828    sodium chloride 0.9% flush 10 mL  10 mL Intravenous Q12H Kaleb Watson MD        tamsulosin 24 hr capsule 0.8 mg  2 capsule Oral Daily Kaleb Christianson MD   0.8 mg at 04/15/24 0827     Scheduled Meds:  Current Facility-Administered Medications   Medication Dose Route Frequency Provider Last Rate Last Admin    dextrose 10% bolus 125 mL 125 mL  12.5 g Intravenous Kaleb Watson MD        dextrose 10% bolus 125 mL 125 mL  12.5 g Intravenous PRKaleb Ennis MD        dextrose 10% bolus 250 mL 250 mL  25 g Intravenous PRKaleb Ennis MD        dextrose 10% bolus 250 mL 250 mL  25 g Intravenous PRKaleb Ennis MD        enoxaparin injection 40 mg  40 mg Subcutaneous Daily Lety Heath MD        glucagon (human recombinant) injection 1 mg  1 mg Intramuscular Kaleb Watson MD        glucagon (human recombinant) injection 1 mg  1 mg Intramuscular PRKaleb Ennis MD        glucose chewable tablet 16 g  16 g Oral PRKaleb Ennis MD        glucose chewable tablet 16 g  16 g Oral PRKaleb Ennis MD        glucose chewable tablet 24 g  24 g Oral Kaleb Watson MD        glucose chewable tablet 24 g  24 g Oral PRN Kaleb Christianson MD        insulin aspart U-100 pen 0-5 Units  0-5 Units Subcutaneous QID (AC + HS) Kaleb Watson MD        isosorbide mononitrate 24 hr tablet 30 mg  30 mg Oral Daily Kaleb Christianson MD   30 mg at 04/15/24 0827    levothyroxine tablet 100 mcg  100 mcg Oral Before breakfast Kaleb Christianson MD   100 mcg at  04/15/24 0522    metoprolol tartrate (LOPRESSOR) tablet 50 mg  50 mg Oral BID Kaleb Christianson MD   50 mg at 04/15/24 0828    naloxone 0.4 mg/mL injection 0.02 mg  0.02 mg Intravenous PRN Kaleb Christianson MD        polyethylene glycol packet 17 g  17 g Oral BID Valencia Barraza MD   17 g at 04/15/24 0828    sodium chloride 0.9% flush 10 mL  10 mL Intravenous Q12H PRN Kaleb Christianson MD        tamsulosin 24 hr capsule 0.8 mg  2 capsule Oral Daily Kaleb Christianson MD   0.8 mg at 04/15/24 0827     PRN Meds:  Current Facility-Administered Medications   Medication Dose Route Frequency Provider Last Rate Last Admin    dextrose 10% bolus 125 mL 125 mL  12.5 g Intravenous PRKaleb Ennis MD        dextrose 10% bolus 125 mL 125 mL  12.5 g Intravenous PRN Kaleb Christianson MD        dextrose 10% bolus 250 mL 250 mL  25 g Intravenous PRN Kaleb Christianson MD        dextrose 10% bolus 250 mL 250 mL  25 g Intravenous PRN Kaleb Christianson MD        enoxaparin injection 40 mg  40 mg Subcutaneous Daily Lety Heath MD        glucagon (human recombinant) injection 1 mg  1 mg Intramuscular PRKaleb Ennis MD        glucagon (human recombinant) injection 1 mg  1 mg Intramuscular PRN Kaleb Christianson MD        glucose chewable tablet 16 g  16 g Oral PRN Kaleb Christianson MD        glucose chewable tablet 16 g  16 g Oral PRN Kaleb Christianson MD        glucose chewable tablet 24 g  24 g Oral PRN Kaleb Christianson MD        glucose chewable tablet 24 g  24 g Oral PRN Kaleb Christianson MD        insulin aspart U-100 pen 0-5 Units  0-5 Units Subcutaneous QID (AC + HS) PRKaleb Ennis MD        isosorbide mononitrate 24 hr tablet 30 mg  30 mg Oral Daily Kaleb Christianson MD   30 mg at 04/15/24 0827    levothyroxine tablet 100 mcg  100 mcg Oral Before breakfast Kaleb Christianson MD   100 mcg at 04/15/24 0522    metoprolol tartrate (LOPRESSOR) tablet 50 mg  50 mg Oral BID Kaleb Christianson MD   50 mg at 04/15/24 0828    naloxone 0.4 mg/mL injection 0.02 mg  0.02 mg Intravenous PRN Kaleb Christianson MD        polyethylene  glycol packet 17 g  17 g Oral BID Valencia Barraza MD   17 g at 04/15/24 0828    sodium chloride 0.9% flush 10 mL  10 mL Intravenous Q12H PRN Kaleb Christianson MD        tamsulosin 24 hr capsule 0.8 mg  2 capsule Oral Daily Kaleb Christianson MD   0.8 mg at 04/15/24 0827       Family History       Problem Relation (Age of Onset)    Diabetes Mother          Tobacco Use    Smoking status: Never     Passive exposure: Never    Smokeless tobacco: Never   Substance and Sexual Activity    Alcohol use: No    Drug use: No    Sexual activity: Not Currently     Partners: Female       Review of Systems   Unable to perform ROS: Dementia   Genitourinary:  Positive for difficulty urinating (resolved).     Objective:     Vital Signs (Most Recent):  Temp: 98.4 °F (36.9 °C) (04/15/24 1300)  Pulse: 61 (04/15/24 1300)  Resp: 20 (04/15/24 1300)  BP: (!) 195/78 (04/15/24 1300)  SpO2: (!) 94 % (04/15/24 1300) Vital Signs (24h Range):  Temp:  [97.8 °F (36.6 °C)-98.5 °F (36.9 °C)] 98.4 °F (36.9 °C)  Pulse:  [58-69] 61  Resp:  [16-20] 20  SpO2:  [93 %-97 %] 94 %  BP: (140-195)/(61-83) 195/78     Weight: 79.6 kg (175 lb 7.8 oz)  Body mass index is 27.49 kg/m².       Physical Exam  Vitals and nursing note reviewed. Exam conducted with a chaperone present.   Constitutional:       General: He is not in acute distress.     Appearance: He is obese. He is ill-appearing (chronically).   HENT:      Head: Normocephalic.      Mouth/Throat:      Mouth: Mucous membranes are moist.   Cardiovascular:      Rate and Rhythm: Normal rate.   Pulmonary:      Effort: Pulmonary effort is normal.   Genitourinary:     Comments: Ramirez, light pink urine   Musculoskeletal:         General: No signs of injury.      Right lower leg: Edema present.      Left lower leg: Edema present.   Skin:     General: Skin is warm and dry.      Findings: Lesion and rash present. Rash is crusting (to head/ neck).      Comments: See wound care photos,     Neurological:      Mental Status: He is alert.  He is disoriented.      Motor: Weakness present.   Psychiatric:         Attention and Perception: Attention normal.         Mood and Affect: Mood normal.         Speech: Speech normal.         Cognition and Memory: Cognition is impaired. Memory is impaired. He exhibits impaired recent memory and impaired remote memory.            Review of Symptoms      Symptom Assessment (ESAS 0-10 Scale)  Pain:  0  Dyspnea:  0  Anxiety:  0  Nausea:  0  Depression:  0  Anorexia:  0  Fatigue:  0  Insomnia:  0  Restlessness:  0  Agitation:  0         Performance Status:  30    Living Arrangements:  Lives with spouse and Lives in home    Psychosocial/Cultural:   See Palliative Psychosocial Note: No  Social Issues Identified: Coping deficit pt/family and New Diagnosis/Trauma  Bereavement Risk: No  Caregiver Needs Discussed. Caregiver Distress: Yes: Issues of guilt, Intensity of family caregiving, and Caregiver Burnout Risk  Cultural: none identified   **Primary  to Follow**  Palliative Care  Consult: No    Spiritual:  F - Nataly and Belief:  Christianity      Time-Based Charting:  Yes  Chart Review: 22 minutes  Face to Face: 29 minutes  Symptom Assessment: 12 minutes  Coordination of Care: 10 minutes  Discharge Plannin minutes  Advance Care Plannin minutes  Goals of Care: 30 minutes    Total Time Spent: 130 minutes        Advance Care Planning  Advance Directives:   Living Will: No    LaPOST: No    Do Not Resuscitate Status: Yes    Medical Power of : No      Decision Making:  Patient answered questions and Family answered questions  Goals of Care: The patient and family endorses that what is most important right now is to focus on spending time at home, avoiding the hospital, remaining as independent as possible, symptom/pain control, quality of life, even if it means sacrificing a little time, improvement in condition but with limits to invasive therapies, and comfort and QOL     Accordingly, we  "have decided that the best plan to meet the patient's goals includes enrolling in hospice care at time of discharge          Significant Labs: All pertinent labs within the past 24 hours have been reviewed.  CBC:   Recent Labs   Lab 04/15/24  0408   WBC 7.16   HGB 11.1*   HCT 33.9*   MCV 90        BMP:  Recent Labs   Lab 04/15/24  0408   *      K 3.8      CO2 26   BUN 23   CREATININE 1.1   CALCIUM 8.5*   MG 1.8     LFT:  Lab Results   Component Value Date    AST 33 04/15/2024    ALKPHOS 135 04/15/2024    BILITOT 0.5 04/15/2024     Albumin:   Albumin   Date Value Ref Range Status   04/15/2024 2.0 (L) 3.5 - 5.2 g/dL Final     Protein:   Total Protein   Date Value Ref Range Status   04/15/2024 5.5 (L) 6.0 - 8.4 g/dL Final     Lactic acid:   No results found for: "LACTATE"    Significant Imaging: I have reviewed all pertinent imaging results/findings within the past 24 hours.      Mirlande Harrell NP  Palliative Medicine  [facility]    Advance Care Planning     Date: 04/15/2024    [facility]  I engaged the patient and family in a voluntary conversation about advance care planning and we specifically addressed what the goals of care would be moving forward, in light of the patient's change in clinical status, specifically dementia, debility, PAD, FTT.  We did specifically address the patient's likely prognosis, which is fair .  We explored the patient's values and preferences for future care.  The patient and family endorses that what is most important right now is to focus on spending time at home, avoiding the hospital, remaining as independent as possible, symptom/pain control, quality of life, even if it means sacrificing a little time, improvement in condition but with limits to invasive therapies, and comfort and QOL     Accordingly, we have decided that the best plan to meet the patient's goals includes enrolling in hospice care at time of discharge.     I did explain the role for hospice " care at this stage of the patient's illness, including its ability to help the patient live with the best quality of life possible.  We will be making a hospice referral.    I spent a total of 52 minutes engaging the patient in this advance care planning discussion.

## 2024-04-15 NOTE — PLAN OF CARE
Pt safety maintained. Whiteboard updated w/current POC. IVF infusing. Medication administered per MAR. BGS monitored. Ramirez to gravity. Weight shifting assistance provided. Zflex boot to bilateral feet. Pt instructed to call w/any needs, verbalized understanding. Bed in lowest position, locked and bed alarms on. Call light w/in pt's reach.     Problem: Adult Inpatient Plan of Care  Goal: Plan of Care Review  Outcome: Ongoing, Progressing  Goal: Patient-Specific Goal (Individualized)  Outcome: Ongoing, Progressing  Goal: Absence of Hospital-Acquired Illness or Injury  Outcome: Ongoing, Progressing  Goal: Optimal Comfort and Wellbeing  Outcome: Ongoing, Progressing     Problem: Diabetes Comorbidity  Goal: Blood Glucose Level Within Targeted Range  Outcome: Ongoing, Progressing     Problem: Fluid and Electrolyte Imbalance (Acute Kidney Injury/Impairment)  Goal: Fluid and Electrolyte Balance  Outcome: Ongoing, Progressing     Problem: Oral Intake Inadequate (Acute Kidney Injury/Impairment)  Goal: Optimal Nutrition Intake  Outcome: Ongoing, Progressing     Problem: Renal Function Impairment (Acute Kidney Injury/Impairment)  Goal: Effective Renal Function  Outcome: Ongoing, Progressing

## 2024-04-15 NOTE — TELEPHONE ENCOUNTER
Attempted to reach out to caregiver at Home health, name was not provided. Left message stating that care giver can follow up with office if assistance is still needed. Call back number left.

## 2024-04-15 NOTE — HPI
"Per chart, "Claude T Dupuis is a 89 y.o. male with past medical history of CAD s/p CABG 2007, dementia, HTN, HLD, PAD, venous stasis ulcers, and penile meatus stricture who presented on 4/13/2024 for abdominal pain x 1 day.     Of note, patient with dementia and poor memory. HPI primarily attained through son at bedside and EMR review.     The patient was in their usual state of health which includes living with family, bed/wheelchair bound, able to feed self, but otherwise dependent on ADLs. Pt recently admitted on 4/4/2023 for acute urinary retention 2/2 meatal stricture and GEORGE. George improved and patient discharge with koroma and plans to perform voiding trial through home health. Family noted poor urine output of koroma for last 24hrs and pt began experiencing severe suprapubic pain prompting family to bring patient to ED. Found to have GEORGE and active urinary retention in ED. Staff able to manipulate koroma and pt now passing urine with relief of pain. [facility] consulted for admission due to post-obstructive GEORGE.     On exam, pt comfortable appearing without focal complaints. Denies any associated fevers, chills, nausea, or vomiting."     "

## 2024-04-16 ENCOUNTER — DOCUMENT SCAN (OUTPATIENT)
Dept: HOME HEALTH SERVICES | Facility: HOSPITAL | Age: 89
End: 2024-04-16
Payer: MEDICARE

## 2024-04-16 VITALS
WEIGHT: 175.5 LBS | BODY MASS INDEX: 27.55 KG/M2 | DIASTOLIC BLOOD PRESSURE: 77 MMHG | OXYGEN SATURATION: 94 % | TEMPERATURE: 98 F | RESPIRATION RATE: 18 BRPM | HEART RATE: 55 BPM | HEIGHT: 67 IN | SYSTOLIC BLOOD PRESSURE: 169 MMHG

## 2024-04-16 PROBLEM — N17.9 AKI (ACUTE KIDNEY INJURY): Status: RESOLVED | Noted: 2024-04-13 | Resolved: 2024-04-16

## 2024-04-16 PROBLEM — R33.8 ACUTE URINARY RETENTION: Status: RESOLVED | Noted: 2024-04-14 | Resolved: 2024-04-16

## 2024-04-16 PROBLEM — N13.9 URINARY OBSTRUCTION: Status: RESOLVED | Noted: 2024-04-13 | Resolved: 2024-04-16

## 2024-04-16 LAB
ALBUMIN SERPL BCP-MCNC: 2.3 G/DL (ref 3.5–5.2)
ALP SERPL-CCNC: 140 U/L (ref 55–135)
ALT SERPL W/O P-5'-P-CCNC: 22 U/L (ref 10–44)
ANION GAP SERPL CALC-SCNC: 10 MMOL/L (ref 8–16)
AST SERPL-CCNC: 26 U/L (ref 10–40)
BASOPHILS # BLD AUTO: 0.06 K/UL (ref 0–0.2)
BASOPHILS NFR BLD: 0.8 % (ref 0–1.9)
BILIRUB SERPL-MCNC: 0.6 MG/DL (ref 0.1–1)
BUN SERPL-MCNC: 21 MG/DL (ref 10–30)
CALCIUM SERPL-MCNC: 9 MG/DL (ref 8.7–10.5)
CHLORIDE SERPL-SCNC: 106 MMOL/L (ref 95–110)
CO2 SERPL-SCNC: 25 MMOL/L (ref 23–29)
CREAT SERPL-MCNC: 1 MG/DL (ref 0.5–1.4)
DIFFERENTIAL METHOD BLD: ABNORMAL
EOSINOPHIL # BLD AUTO: 0.5 K/UL (ref 0–0.5)
EOSINOPHIL NFR BLD: 6.2 % (ref 0–8)
ERYTHROCYTE [DISTWIDTH] IN BLOOD BY AUTOMATED COUNT: 14.1 % (ref 11.5–14.5)
EST. GFR  (NO RACE VARIABLE): >60 ML/MIN/1.73 M^2
GLUCOSE SERPL-MCNC: 109 MG/DL (ref 70–110)
HCT VFR BLD AUTO: 39.9 % (ref 40–54)
HGB BLD-MCNC: 12.6 G/DL (ref 14–18)
IMM GRANULOCYTES # BLD AUTO: 0.08 K/UL (ref 0–0.04)
IMM GRANULOCYTES NFR BLD AUTO: 1 % (ref 0–0.5)
LYMPHOCYTES # BLD AUTO: 2 K/UL (ref 1–4.8)
LYMPHOCYTES NFR BLD: 25.7 % (ref 18–48)
MAGNESIUM SERPL-MCNC: 1.8 MG/DL (ref 1.6–2.6)
MCH RBC QN AUTO: 29.4 PG (ref 27–31)
MCHC RBC AUTO-ENTMCNC: 31.6 G/DL (ref 32–36)
MCV RBC AUTO: 93 FL (ref 82–98)
MONOCYTES # BLD AUTO: 0.5 K/UL (ref 0.3–1)
MONOCYTES NFR BLD: 6.8 % (ref 4–15)
NEUTROPHILS # BLD AUTO: 4.7 K/UL (ref 1.8–7.7)
NEUTROPHILS NFR BLD: 59.5 % (ref 38–73)
NRBC BLD-RTO: 0 /100 WBC
PHOSPHATE SERPL-MCNC: 2.7 MG/DL (ref 2.7–4.5)
PLATELET # BLD AUTO: 295 K/UL (ref 150–450)
PMV BLD AUTO: 9.7 FL (ref 9.2–12.9)
POCT GLUCOSE: 117 MG/DL (ref 70–110)
POCT GLUCOSE: 121 MG/DL (ref 70–110)
POTASSIUM SERPL-SCNC: 4.2 MMOL/L (ref 3.5–5.1)
PROT SERPL-MCNC: 6.2 G/DL (ref 6–8.4)
RBC # BLD AUTO: 4.28 M/UL (ref 4.6–6.2)
SODIUM SERPL-SCNC: 141 MMOL/L (ref 136–145)
WBC # BLD AUTO: 7.89 K/UL (ref 3.9–12.7)

## 2024-04-16 PROCEDURE — 25000003 PHARM REV CODE 250: Performed by: STUDENT IN AN ORGANIZED HEALTH CARE EDUCATION/TRAINING PROGRAM

## 2024-04-16 PROCEDURE — 36415 COLL VENOUS BLD VENIPUNCTURE: CPT | Performed by: STUDENT IN AN ORGANIZED HEALTH CARE EDUCATION/TRAINING PROGRAM

## 2024-04-16 PROCEDURE — 83735 ASSAY OF MAGNESIUM: CPT | Performed by: STUDENT IN AN ORGANIZED HEALTH CARE EDUCATION/TRAINING PROGRAM

## 2024-04-16 PROCEDURE — 80053 COMPREHEN METABOLIC PANEL: CPT | Performed by: STUDENT IN AN ORGANIZED HEALTH CARE EDUCATION/TRAINING PROGRAM

## 2024-04-16 PROCEDURE — 25000003 PHARM REV CODE 250

## 2024-04-16 PROCEDURE — 99232 SBSQ HOSP IP/OBS MODERATE 35: CPT | Mod: ,,, | Performed by: UROLOGY

## 2024-04-16 PROCEDURE — 85025 COMPLETE CBC W/AUTO DIFF WBC: CPT | Performed by: STUDENT IN AN ORGANIZED HEALTH CARE EDUCATION/TRAINING PROGRAM

## 2024-04-16 PROCEDURE — 84100 ASSAY OF PHOSPHORUS: CPT | Performed by: STUDENT IN AN ORGANIZED HEALTH CARE EDUCATION/TRAINING PROGRAM

## 2024-04-16 PROCEDURE — 25000003 PHARM REV CODE 250: Performed by: INTERNAL MEDICINE

## 2024-04-16 PROCEDURE — 99233 SBSQ HOSP IP/OBS HIGH 50: CPT | Mod: ,,,

## 2024-04-16 RX ORDER — ACETAMINOPHEN 325 MG/1
650 TABLET ORAL EVERY 6 HOURS PRN
Status: DISCONTINUED | OUTPATIENT
Start: 2024-04-16 | End: 2024-04-16 | Stop reason: HOSPADM

## 2024-04-16 RX ORDER — LISINOPRIL 10 MG/1
10 TABLET ORAL ONCE
Status: DISCONTINUED | OUTPATIENT
Start: 2024-04-16 | End: 2024-04-16

## 2024-04-16 RX ORDER — TALC
6 POWDER (GRAM) TOPICAL NIGHTLY PRN
Status: DISCONTINUED | OUTPATIENT
Start: 2024-04-16 | End: 2024-04-16 | Stop reason: HOSPADM

## 2024-04-16 RX ORDER — LISINOPRIL 10 MG/1
10 TABLET ORAL DAILY
Status: DISCONTINUED | OUTPATIENT
Start: 2024-04-16 | End: 2024-04-16 | Stop reason: HOSPADM

## 2024-04-16 RX ADMIN — SODIUM CHLORIDE 3000 ML: 900 IRRIGANT IRRIGATION at 04:04

## 2024-04-16 RX ADMIN — ISOSORBIDE MONONITRATE 30 MG: 30 TABLET, EXTENDED RELEASE ORAL at 08:04

## 2024-04-16 RX ADMIN — Medication: at 08:04

## 2024-04-16 RX ADMIN — LEVOTHYROXINE SODIUM 100 MCG: 100 TABLET ORAL at 06:04

## 2024-04-16 RX ADMIN — MUPIROCIN: 20 OINTMENT TOPICAL at 08:04

## 2024-04-16 RX ADMIN — LISINOPRIL 10 MG: 10 TABLET ORAL at 08:04

## 2024-04-16 RX ADMIN — Medication 6 MG: at 01:04

## 2024-04-16 RX ADMIN — METOPROLOL TARTRATE 50 MG: 50 TABLET, FILM COATED ORAL at 08:04

## 2024-04-16 RX ADMIN — TAMSULOSIN HYDROCHLORIDE 0.8 MG: 0.4 CAPSULE ORAL at 08:04

## 2024-04-16 RX ADMIN — SODIUM CHLORIDE 3000 ML: 900 IRRIGANT IRRIGATION at 12:04

## 2024-04-16 RX ADMIN — POLYETHYLENE GLYCOL 3350 17 G: 17 POWDER, FOR SOLUTION ORAL at 08:04

## 2024-04-16 NOTE — ASSESSMENT & PLAN NOTE
Mr Cox is a 89 y/oM  with past medical history of CAD s/p CABG 2007, dementia, HTN, HLD, PAD, venous stasis ulcers, and penile meatus stricture who presented on 4/13/2024 for abdominal pain x 1 day related to urinary retention. At baseline, pt in lives with his wife,is recently bed/wheelchair bound, able to feed self, but otherwise dependent on ADLs. Pt recently admitted on 4/4/2023 for acute urinary retention 2/2 meatal stricture and KANCHAN. Family noted poor urine output of koroma for last 24hrs and pt began experiencing severe suprapubic pain prompting family to bring patient to ED. Staff able to manipulate koroma and pt now passing urine with relief of pain. Palliative care consulted for goals of care and end of life discussion including hospice.       4/16/2024  -Followed up with pt and family at the bedside this morning. Pt more coherent and able to recall events leading up to admission.  Pt reiterates that he is ready to get back home to his wife and family.    -Pts son at the bedside reports that family held discussion yesterday and they have decided to bring the pt home under the care of home hospice.  Consents were signed yesterday.  Equipment was delivered to pts home last night.    -All questions addressed at this time.  Palliative care will sign off at this time.  Pt will discharge under the care of home hospice today.  Pt and son at bedside in agreement with plan.        4/15/2024  -At time of initial encounter, pt resting in bed with son Anton Cox and grandson Pedro at the bedside.  Pt oriented to self and location, unsure of exact events leading up to admission.  Pt denies any pain or discomfort at this time. Majority of information obtained via son.  Pt lives at home with his wife.  Up until the last few months, pt remained quite active and involved.  Pt has drastically decline physically and mentally over the past month.  Pt with 4 ED visits within the past month as well.   Family has experienced  difficulty finding medical support/ equipment to meet the pts needs at home.  -Discussed GOC moving forward. Son reports that the highest priority is making sure the pts needs are met, the pt remains free from pain and that the pt remains at home with his wife of 70 plus years if possible.  They acknowledge the pts decline and recognize that he is entering a different phase in his life.  I Introduced the topic of home hospice and what this option could offer the family.   Pts grandson familiar with hospice services as he recently had a loved one under the care of home hospice.   -Pts son agreeable to meet with home hospice representative.  CM notified and will set up meeting. Primary team notified as well.   -Pt DNR status confirmed.

## 2024-04-16 NOTE — PROGRESS NOTES
[facility] Surg  Urology  Progress Note    Patient Name: Claude T Dupuis  MRN: [id number]  Admission Date: 4/13/2024  Hospital Length of Stay: 2 days  Code Status: DNR   Attending Provider: Lety Heath MD   Primary Care Physician: Seb Gray MD    Subjective:     HPI:  Claude T Dupuis is a 89 y.o. male with past medical history of CAD s/p CABG 2007, dementia, HTN, HLD, PAD, venous stasis ulcers, and penile meatus stricture who presented on 4/13/2024 for abdominal pain x 1 day. Patient is admitted to [facility] for post-obstructive KANHCAN 2/2 acute urinary retention from koroma malfunction.  Koroma manipulated in the ED with increased urine output. Creatinine 1.1 this morning (baseline).  White count 7.  UA with over 100 reds, over 100 whites and many bacteria.  Urine culture pending.  No  imaging available from this admission.  Urine output with 500 cc overnight.    Interval History:  Afebrile stable vital signs overnight.  CBI clamped at 4:00 a.m. this morning.  No complaints of pain, nausea, vomiting, fevers, chills.  Creatinine 1.0, white count 7.8, hemoglobin 12.6.      Objective:     Temp:  [98.3 °F (36.8 °C)-98.4 °F (36.9 °C)] 98.3 °F (36.8 °C)  Pulse:  [61-87] 87  Resp:  [16-20] 18  SpO2:  [94 %-97 %] 96 %  BP: (155-195)/(68-96) 171/96     Body mass index is 27.49 kg/m².           Drains       Drain  Duration                  Urethral Catheter -- days                     Physical Exam  Vitals and nursing note reviewed.   Constitutional:       Appearance: Normal appearance.   HENT:      Head: Atraumatic.      Nose: Nose normal.   Eyes:      Extraocular Movements: Extraocular movements intact.      Pupils: Pupils are equal, round, and reactive to light.   Cardiovascular:      Rate and Rhythm: Normal rate.   Pulmonary:      Effort: Pulmonary effort is normal.   Abdominal:      General: Abdomen is flat. There is no distension.      Tenderness: There is no abdominal tenderness. There is no right CVA  tenderness or left CVA tenderness.   Genitourinary:     Comments: Twenty Armenian three-way Ramirez catheter in place with clear yellow urine in the tubing.  Musculoskeletal:         General: Normal range of motion.      Cervical back: Normal range of motion.   Skin:     Coloration: Skin is not jaundiced.   Neurological:      General: No focal deficit present.      Mental Status: He is alert and oriented to person, place, and time.   Psychiatric:         Mood and Affect: Mood normal.         Behavior: Behavior normal.           Significant Labs:    BMP:  Recent Labs   Lab 04/14/24  0521 04/15/24  0408 04/16/24 0449    141 141   K 4.2 3.8 4.2    106 106   CO2 26 26 25   BUN 27 23 21   CREATININE 1.7* 1.1 1.0   CALCIUM 8.8 8.5* 9.0       CBC:   Recent Labs   Lab 04/14/24  0521 04/15/24  0408 04/16/24  0449   WBC 15.09* 7.16 7.89   HGB 13.1* 11.1* 12.6*   HCT 39.6* 33.9* 39.9*    291 295       All pertinent labs results from the past 24 hours have been reviewed.    Significant Imaging:  All pertinent imaging results/findings from the past 24 hours have been reviewed.                  Assessment/Plan:     Hematuria  - strict I's and O's  - trend creatinine  - maintain Ramirez catheter for now, CBI clamped and plugged this morning given urine remained clear yellow  - nursing to irrigate catheter p.r.n.  - no other urologic interventions this admission  - okay for diet from a urologic perspective  - recommend goals of care conversation with family, previously declined all urologic interventions and evaluations  - please reach out with questions or concerns        VTE Risk Mitigation (From admission, onward)           Ordered     enoxaparin injection 40 mg  Daily         04/15/24 1154     IP VTE HIGH RISK PATIENT  Once         04/13/24 2301     Place sequential compression device  Until discontinued         04/13/24 2301                    Jase Quinteros MD  Urology  [facility] Surg

## 2024-04-16 NOTE — NURSING
Patient back on floor. Family at bedside. Urology initialed CBI. Light pink urine noted. Pt tolerated well.

## 2024-04-16 NOTE — PLAN OF CARE
NASIM spoke with Viktoriya at [facility] to confirm that HME was delivered. Viktoriya stated that everything arrived yesterday evening and they are ready to accept the pt. NASIM spoke with Anton [phone] to discuss final d/c assessment. Anton would like to have his father d/c from the hospital today around 3pm so that he may be at the hospital. NASIM set up ambulance transport for 3pm. Pt will d.c to home hospice and be on [facility] service. Rounds completed on pt. All questions addressed. Bedside nurse to discuss d/c medications. Discussed importance to attend all f/u appts and take medications as prescribed. Verbalized understanding. Case Management to sign off.     Phi Morgan, MSW  [phone]    Future Appointments   Date Time Provider Department Center   4/18/2024  9:00 AM Seb Gray MD [facility] IM [place]   5/1/2024  4:00 PM Aneta Olmstead PA-C [facility] WOUND [address]   8/26/2024  1:00 PM Seb Gray MD [facility] [place]        04/16/24 1059   Final Note   Assessment Type Final Discharge Note   Anticipated Discharge Disposition [facility]ospice[facility]   What phone number can be called within the next 1-3 days to see how you are doing after discharge? [phone]   Post-Acute Status   Post-Acute Authorization Hospice   Hospice Status Set-up Complete/Auth obtained   Coverage PHN   Discharge Delays None known at this time

## 2024-04-16 NOTE — SUBJECTIVE & OBJECTIVE
Interval History:  Afebrile stable vital signs overnight.  CBI clamped at 4:00 a.m. this morning.  No complaints of pain, nausea, vomiting, fevers, chills.  Creatinine 1.0, white count 7.8, hemoglobin 12.6.      Objective:     Temp:  [98.3 °F (36.8 °C)-98.4 °F (36.9 °C)] 98.3 °F (36.8 °C)  Pulse:  [61-87] 87  Resp:  [16-20] 18  SpO2:  [94 %-97 %] 96 %  BP: (155-195)/(68-96) 171/96     Body mass index is 27.49 kg/m².           Drains       Drain  Duration                  Urethral Catheter -- days                     Physical Exam  Vitals and nursing note reviewed.   Constitutional:       Appearance: Normal appearance.   HENT:      Head: Atraumatic.      Nose: Nose normal.   Eyes:      Extraocular Movements: Extraocular movements intact.      Pupils: Pupils are equal, round, and reactive to light.   Cardiovascular:      Rate and Rhythm: Normal rate.   Pulmonary:      Effort: Pulmonary effort is normal.   Abdominal:      General: Abdomen is flat. There is no distension.      Tenderness: There is no abdominal tenderness. There is no right CVA tenderness or left CVA tenderness.   Genitourinary:     Comments: Twenty Mohawk three-way Ramirez catheter in place with clear yellow urine in the tubing.  Musculoskeletal:         General: Normal range of motion.      Cervical back: Normal range of motion.   Skin:     Coloration: Skin is not jaundiced.   Neurological:      General: No focal deficit present.      Mental Status: He is alert and oriented to person, place, and time.   Psychiatric:         Mood and Affect: Mood normal.         Behavior: Behavior normal.           Significant Labs:    BMP:  Recent Labs   Lab 04/14/24  0521 04/15/24  0408 04/16/24  0449    141 141   K 4.2 3.8 4.2    106 106   CO2 26 26 25   BUN 27 23 21   CREATININE 1.7* 1.1 1.0   CALCIUM 8.8 8.5* 9.0       CBC:   Recent Labs   Lab 04/14/24  0521 04/15/24  0408 04/16/24  0449   WBC 15.09* 7.16 7.89   HGB 13.1* 11.1* 12.6*   HCT 39.6* 33.9*  39.9*    291 295       All pertinent labs results from the past 24 hours have been reviewed.    Significant Imaging:  All pertinent imaging results/findings from the past 24 hours have been reviewed.

## 2024-04-16 NOTE — NURSING
Patient is AAOX2 and he thinks he is being kept in a garage and wants to go home, demanded that I call his son Anton, which I did a few minutes ago, but now he is insisting on calling him again. I have explained to him that they are sleeping and he said that he does not care. I offered him Melatonin and he said yes, also c/o foot pain, tylenol was put in for him and he refused it, patient care ongoing.

## 2024-04-16 NOTE — PROGRESS NOTES
Urology Progress Note    Patient successfully completed a voiding trial on 04/16/2024 following Ramirez catheter removal.  Patient without suprapubic pain or urgency.  No hematuria or blood clots noted.    -okay to discharge from urologic perspective  -follow up as needed with Dr. Guerra.  No scheduled follow up needed at this time.    Urology will now sign off. Please call with any additional questions/concerns.     Jase Quinteros MD  Department of Urology  PGY-3  Pager: [phone]

## 2024-04-16 NOTE — NURSING
Patient just had last drink of the night, patient is now NPO for Urology in the AM, all food and drink removed from bedside table, patient care ongoing.

## 2024-04-16 NOTE — DISCHARGE SUMMARY
[facility] Hospital Medicine Discharge Summary    Primary Team: [facility] Hospitalist Team A  Attending Physician: Lety Heath MD  Resident: Agustin  Intern: Madi    Date of Admit: 4/13/2024  Date of Discharge: 4/16/2024    Discharge to: Home with Home Hospice  Condition: Stable    Discharge Diagnoses     Urinary retention    Consultants and Procedures     Consultants:  Urology    Procedures:   Ramirez catheter exchange    Imaging:  Imaging Results              X-Ray Chest AP Portable (Final result)  Result time 04/14/24 02:40:23      Final result by Gabriel Vieira MD (04/14/24 02:40:23)                   Impression:      Chronic findings as above.    No significant change from prior study.      Electronically signed by: Gabriel Vieira MD  Date:    04/14/2024  Time:    02:40               Narrative:    EXAMINATION:  XR CHEST AP PORTABLE    CLINICAL HISTORY:  leukocytosis; Acute cystitis with hematuria    TECHNIQUE:  Single frontal view of the chest was performed.    COMPARISON:  04/29/2019.    FINDINGS:  Sternotomy wires, similar to prior.  Patient rotated to the right.  Underinflated lungs with hypoventilatory change.  Increased interstitial markings in the lungs, similar compared to prior.  Chronic elevation right hemidiaphragm.    Heart and lungs  appear unchanged when allowing for differences in technique and positioning.                                      Brief History of Present Illness      Claude T Dupuis is a 89 y.o. male with past medical history of CAD s/p CABG 2007, dementia, HTN, HLD, PAD, venous stasis ulcers, and penile meatus stricture who presented on 4/13/2024 for abdominal pain x 1 day.     Of note, patient with dementia and poor memory. HPI primarily attained through son at bedside and EMR review.     The patient was in their usual state of health which includes living with family, bed/wheelchair bound, able to feed self, but otherwise dependent on ADLs. Pt recently admitted on 4/4/2023 for  acute urinary retention 2/2 meatal stricture and GEORGE. George improved and patient discharge with koroma and plans to perform voiding trial through home health. Family noted poor urine output of koroma for last 24hrs and pt began experiencing severe suprapubic pain prompting family to bring patient to ED. Found to have GEORGE and active urinary retention in ED. Staff able to manipulate koroma and pt now passing urine with relief of pain. [facility] consulted for admission due to post-obstructive GEORGE.    For the full HPI please refer to the History & Physical from this admission.    Hospital Course By Problem with Pertinent Findings     Acute Urinary Retention  Indwelling koroma present on admission  -Gentle hydration with IVF, monitor for post-obstructive diuresis  -Urology consulted; upsized koroma  - CT abdomen did not show any clots in bladder  - Per urology continue koroma, no other urologic interventions  -Discharged with Koroma, urology follow-up     GEORGE, likely post-obstructive, resolved  Hematuria  Pyuria/Bacteruria  Admit Cr 2.1, elevated from 1 four days prior. UA with above findings. Pt's symptoms including elevated WBC likely stemming from acute stress response to retention. Pt currently without any pain.  -CTM, low threshold to start abx if fevers or WBC worsens  -Improving post relief of obstruction, CTM     Failure to thrive  Low Albumin  Cognitive Decline  -PT/OT ordered for functional assessment  -Discussions with case management for placement options  -DC for home with hospice     Chronic LE Ulcers  - wound care while inpatient.  - Pain management if needed  - Does not look acutely infected, holding off on abx  -Continue wound care with hospice     Hypothyroidism  -TSH 10.46 seven days prior, questionable utility due drawn during acute healthcare event  -Continue home synthroid 100mcg      Chronic Hypertension  -Continue home imdur and lopressor     DMII  A1c 7.4 on 3/2024, no home meds  -SSI while inpatient      Hx of CABG 2007, CAD  HLD  -No acute issues, stopped statin/asa due to age       Discharge Medications        Medication List        CONTINUE taking these medications      acetaminophen 80 MG Supp  Commonly known as: TYLENOL     atorvastatin 40 MG tablet  Commonly known as: LIPITOR  Take 1 tablet (40 mg total) by mouth once daily.     isosorbide mononitrate 30 MG 24 hr tablet  Commonly known as: IMDUR  Take 1 tablet (30 mg total) by mouth once daily.     latanoprost 0.005 % ophthalmic solution     levothyroxine 100 MCG tablet  Commonly known as: SYNTHROID  Take 1 tablet (100 mcg total) by mouth before breakfast.     metoprolol tartrate 50 MG tablet  Commonly known as: LOPRESSOR     tamsulosin 0.4 mg Cap  Commonly known as: FLOMAX            STOP taking these medications      HYDROcodone-acetaminophen 5-325 mg per tablet  Commonly known as: NORCO              Discharge Information:   Diet:  cardiac    Physical Activity:  As tolerated             Instructions:  1. Take all medications as prescribed  2. Keep all follow-up appointments  3. Return to the hospital or call your primary care physicians if any worsening symptoms such as fever, chest pain, shortness of breath, return of symptoms, or any other concerns.    Follow-Up Appointments:  Urology    Valencia Barraza MD  hospitals Internal Medicine/Pediatrics [facility]  04/16/2024  10:35 AM

## 2024-04-16 NOTE — MEDICAL/APP STUDENT
"[facility] Medicine Progress Note    Primary Team: \[facility]\"" Hospitalist Team A  Attending Physician: Lety Heath MD  Resident: Omkar Velez MD    Chief Complaint   Stomach pain x1 day    Subjective:      Pt reports no pain today, but notes feeling mild abdominal pain yesterday. Urine flowing and less hematuria present in bag.     Objective:     Last 24 Hour Vital Signs:  BP  Min: 156/68  Max: 195/78  Temp  Av.4 °F (36.9 °C)  Min: 98.3 °F (36.8 °C)  Max: 98.4 °F (36.9 °C)  Pulse  Av.6  Min: 61  Max: 87  Resp  Av.8  Min: 18  Max: 20  SpO2  Av.2 %  Min: 94 %  Max: 97 %  Height  Av' 7" (170.2 cm)  Min: 5' 7" (170.2 cm)  Max: 5' 7" (170.2 cm)  Weight  Av.6 kg (175 lb 7.8 oz)  Min: 79.6 kg (175 lb 7.8 oz)  Max: 79.6 kg (175 lb 7.8 oz)  I/O last 3 completed shifts:  In: 1177.6 [P.O.:480; I.V.:697.6]  Out: 100 [Urine:100]    PPhysical Examination:  General: A&Ox3, resting comfortably in bed, poor memory, unable to recall recent events, at baseline  HEENT: EOMI, moist mucus membranes w/ no erythema noted, no facial asymmetry noted  Neck: Trachea midline, no masses, no lymphadenopathy  Cardiovascular: Regular rate and rhythm, normal S1 and S2, no murmurs, rubs or gallops  Pulm: CTABL, no distress  Abdomen: Soft, non-tender, non-distended; no organomegaly  : Ramirez in place with active urine flow, clear  Skin: several ulcers on lower extremeties, tense bullae on R shin  Extremities: BLE ulcers, not infected appearing, pitting edema in bilatearl lower extremities much improved.             Pulses: 1+ BLE pulses  Neurological: A&Ox3, 5/5 upper and lower extremity strength, face midline, smile symmetric  Psychiatric: Normal mood and affect, thought content normal     Laboratory:  Recent Labs  Lab 24  1416 24  2105 24  2132 24  0521 04/15/24  04:24 0449   WBC 9.23 15.35*  --  15.09* 7.16 7.89   HGB 12.7* 13.6*  --  13.1* 11.1 12.6    315  --  333 291 295 "   MCV 90 87  --  90 90 93     --  133* 138 141 141   K 3.8  --  4.4 4.2 3.8 4.2     --  97 102 106 106   CO2 28  --  22* 26 26 25   BUN 12  --  29 27 23 21   CREATININE 1.0  --  2.1* 1.7* 1.1 1.0   *  --  175* 188* 122 109   CALCIUM 8.8  --  9.0 8.8 8.5 9.0   PROT 6.0  --  6.7 6.3 5.5 6.2   ALBUMIN 2.4*  --  2.4* 2.2* 2.0 2.3   PHOS  --   --   --  3.5 2.4 2.7   MG  --   --   --  1.8 1.8 1.8   AST 20  --  34 27 33 26   ALT 18  --  27 22 23 22   ALKPHOS 145*  --  173* 153* 135 140*      All laboratory data reviewed     Microbiology Data: Ucx pending        Other Results:  EKG none     Radiology Data Reviewed: yes    CT Abdomen Pelvis w/out contrast  CLINICAL HISTORY:  Urinary retention;  Retention of urine, unspecified     FINDINGS:  Comparison is 04/09/2024.     There is minimal scar at the lung bases.  There are coronary artery calcifications.  The liver, gallbladder, biliary tree, spleen show nothing unusual.  There is a small hiatal hernia.  Pancreas and duodenum show nothing unusual.  Adrenal glands are not enlarged.  There are bilateral renal cysts some of which are hemorrhagic.  No hydronephrosis is seen.  No ureter calculi are seen.  There is body wall edema.  There is thickening of the bladder wall.  There is a Ramirez catheter.  No obstruction, ileus, or perforation seen.  Bowel loops show nothing unusual.  No adenopathy is seen.  No renal calculi are seen.  The bones reveal DJD.     Impression:   Thickening of the bladder wall.   Coronary artery calcifications.   Small hiatal hernia.   Bilateral renal cysts.        Electronically signed by:Lucas Heard MD  Date:                                            04/15/2024  Time:                                           13:43      CXR:   FINDINGS:  Sternotomy wires, similar to prior.  Patient rotated to the right.  Underinflated lungs with hypoventilatory change.  Increased interstitial markings in the lungs, similar compared to prior.  Chronic  elevation right hemidiaphragm.     Heart and lungs  appear unchanged when allowing for differences in technique and positioning.     Impression:  Chronic findings as above.  No significant change from prior study.     Electronically signed by:Gabriel Vieira MD  Date:                                            04/14/2024  Time:                                           02:40     Antibiotics and Day Number of Therapy:  none        Assessment:      Claude T Dupuis is a 89 y.o.male with past medical history of CAD s/p CABG 2007, dementia, HTN, HLD, PAD, venous stasis ulcers, and penile meatus stricture who presented on 4/13/2024 for abdominal pain x 1 day. Patient is admitted to [facility] for post-obstructive KANCHAN 2/2 acute urinary retention from koroma malfunction with improvement in Cr and pain for the past 24hrs.      Acute Urinary Retention  Recently discharged with indwelling koroma on 4/4/2024 secondary to acute urinary retention from meatal stricture. Required urology for placement, followed up with Dr. Guerra with plans for voiding trial with home health. Koroma manipulated by ED staff, now flowing. -No urology coverage today, consult in AM (Dr. Guerra available then)  -Gentle hydration with IVF, monitor for post-obstructive diuresis  -Urology consulted, recommend against Tx of UTI unless symptoms present. -Koroma flushed and up sized to 20 [place]     KANCHAN, likely post-obstructive  Hematuria  Pyuria/Bacteruria  Cr now at 1.0. Admitted at Cr 2.1. UA with above findings. Pt's previous symptoms including elevated WBC likely stemming from acute stress response to retention. Pt currently without any pain.   -Postponing Abx at this time as pt is asymptomatic, afebrile, and culture negative.  -CTM, low threshold to start abx if fevers or WBC worsens  -Improving post relief of obstruction, CTM.   -Hematuria in koroma bag this AM, improving from yesterday.   -Urology visit with pt this AM. Going to switch koroma to larger  size after flushing        Failure to thrive  Low Albumin  Cognitive Decline  -PT/OT ordered for functional assessment  -Discussions with case management for placement options  -Discharge for home with hospice     Chronic LE Ulcers  Hx of chronic LE ulcers from PAD and venous insufficiency. Toe pressures in 2022 with severe arterial disease. Has frequent wound care, non appear infected  -wound care while inpatient.  - Pain management if needed  - Does not look acutely infected, holding off on abx, b/l compression boot currently on and edema much improved      Hypothyroidism  -TSH 10.46 eight days prior, questionable utility due drawn during acute healthcare event  -Continue home synthroid 100mcg      Chronic Hypertension  -Continue home imdur, lopressor, and start lisinopril 10mg daily     DMII  A1c 7.4 on 3/2024, no home meds  -SSI while inpatient     Hx of CABG 2007, CAD  HLD  -No acute issues, stopped statin/asa due to age     Healthcare maintenance   -primary care provider is Seb Gray MD      Diet: Cardiac  VTE PPx: Lovenox  Code Status: DNR, verified with son at bedside.     Dispo: admit for urology consultation  Estimated LOS: 1-2 days.         CHESTER Fierro-S3  [facility]

## 2024-04-16 NOTE — NURSING
Assumed care of patient from PANFILO Smith, patient is extremely [place], AAOX2, d/o to time and situation, no c/o pain or discomfort, vitals WNL, room air, 3 way koroma for CBI, will d/c at 0400 as long as output is clear, waffle mattress on bed, swallows pills whole, large pus filled blister on lower right distal calf, catheter care provided, patient has heel protector boots on and medication placed on bilateral heels, ANNMARIE, all safety precautions are in place, call bell and tray table are within reach of the patient, no additional questions or concerns from the patient at this time, patient care ongoing.

## 2024-04-16 NOTE — SUBJECTIVE & OBJECTIVE
Interval History: Pt with some slight agitation overnight, since resolved.  Pt will discharge home under the care of home hospice today.     Medications:  Continuous Infusions:  Current Facility-Administered Medications   Medication Dose Route Frequency Provider Last Rate Last Admin    acetaminophen tablet 650 mg  650 mg Oral Q6H PRN Kaleb Christianson MD        balsam peru-castor oiL Oint   Topical (Top) BID Lety Heath MD   Given at 04/16/24 0857    dextrose 10% bolus 125 mL 125 mL  12.5 g Intravenous PRN Kaleb Christianson MD        dextrose 10% bolus 250 mL 250 mL  25 g Intravenous PRN Kaleb Christianson MD        enoxaparin injection 40 mg  40 mg Subcutaneous Daily Lety Heath MD   40 mg at 04/15/24 1711    glucagon (human recombinant) injection 1 mg  1 mg Intramuscular PRN Kaleb Christianson MD        glucose chewable tablet 16 g  16 g Oral PRN Kaleb Christianson MD        glucose chewable tablet 24 g  24 g Oral PRN Kaleb Christianson MD        insulin aspart U-100 pen 0-5 Units  0-5 Units Subcutaneous QID (AC + HS) PRN Kaleb Christianson MD        isosorbide mononitrate 24 hr tablet 30 mg  30 mg Oral Daily Kaleb Christianson MD   30 mg at 04/16/24 0857    levothyroxine tablet 100 mcg  100 mcg Oral Before breakfast Kaleb Christianson MD   100 mcg at 04/16/24 0606    lisinopriL tablet 10 mg  10 mg Oral Daily Omkar Velez MD   10 mg at 04/16/24 0857    melatonin tablet 6 mg  6 mg Oral Nightly PRN Kaleb Christianson MD   6 mg at 04/16/24 0139    metoprolol tartrate (LOPRESSOR) tablet 50 mg  50 mg Oral BID Kaleb Christianson MD   50 mg at 04/16/24 0857    mupirocin 2 % ointment   Nasal BID Lety Heath MD   Given at 04/16/24 0857    naloxone 0.4 mg/mL injection 0.02 mg  0.02 mg Intravenous PRN Kaleb Christianson MD        polyethylene glycol packet 17 g  17 g Oral BID Valencia Barraza MD   17 g at 04/16/24 0857    sodium chloride 0.9% flush 10 mL  10 mL Intravenous Q12H PRN Kaleb Christianson MD        sodium chloride 0.9% irrigation 3,000 mL  3,000 mL Bladder Instillation Continuous  Jase Quinteros  mL/hr at 04/16/24 0400 3,000 mL at 04/16/24 0400    tamsulosin 24 hr capsule 0.8 mg  2 capsule Oral Daily Kaleb Christianson MD   0.8 mg at 04/16/24 0857     Scheduled Meds:  Current Facility-Administered Medications   Medication Dose Route Frequency Provider Last Rate Last Admin    acetaminophen tablet 650 mg  650 mg Oral Q6H PRN Kaleb Christianson MD        balsam peru-castor oiL Oint   Topical (Top) BID Lety Heath MD   Given at 04/16/24 0857    dextrose 10% bolus 125 mL 125 mL  12.5 g Intravenous PRN Kaleb Christianson MD        dextrose 10% bolus 250 mL 250 mL  25 g Intravenous PRN Kaleb Christianson MD        enoxaparin injection 40 mg  40 mg Subcutaneous Daily Lety Heath MD   40 mg at 04/15/24 1711    glucagon (human recombinant) injection 1 mg  1 mg Intramuscular PRN Kaleb Christianson MD        glucose chewable tablet 16 g  16 g Oral PRN Kaleb Christianson MD        glucose chewable tablet 24 g  24 g Oral PRN Kaleb Christianson MD        insulin aspart U-100 pen 0-5 Units  0-5 Units Subcutaneous QID (AC + HS) PRN Kaleb Christianson MD        isosorbide mononitrate 24 hr tablet 30 mg  30 mg Oral Daily Kaleb Christianson MD   30 mg at 04/16/24 0857    levothyroxine tablet 100 mcg  100 mcg Oral Before breakfast Kaleb Christianson MD   100 mcg at 04/16/24 0606    lisinopriL tablet 10 mg  10 mg Oral Daily Omkar Velez MD   10 mg at 04/16/24 0857    melatonin tablet 6 mg  6 mg Oral Nightly PRN Kaleb Christianson MD   6 mg at 04/16/24 0139    metoprolol tartrate (LOPRESSOR) tablet 50 mg  50 mg Oral BID Kaleb Christianson MD   50 mg at 04/16/24 0857    mupirocin 2 % ointment   Nasal BID Lety Heath MD   Given at 04/16/24 0857    naloxone 0.4 mg/mL injection 0.02 mg  0.02 mg Intravenous PRN Kaleb Christianson MD        polyethylene glycol packet 17 g  17 g Oral BID Valencia Barraza MD   17 g at 04/16/24 0857    sodium chloride 0.9% flush 10 mL  10 mL Intravenous Q12H PRN Kaleb Christianson MD        sodium chloride 0.9% irrigation 3,000 mL  3,000 mL Bladder  Instillation Continuous Jase Quinteros  mL/hr at 04/16/24 0400 3,000 mL at 04/16/24 0400    tamsulosin 24 hr capsule 0.8 mg  2 capsule Oral Daily Kaleb Christianson MD   0.8 mg at 04/16/24 0857     PRN Meds:  Current Facility-Administered Medications   Medication Dose Route Frequency Provider Last Rate Last Admin    acetaminophen tablet 650 mg  650 mg Oral Q6H PRN Kaleb Christianson MD        balsam peru-castor oiL Oint   Topical (Top) BID Lety Heath MD   Given at 04/16/24 0857    dextrose 10% bolus 125 mL 125 mL  12.5 g Intravenous PRN Kaleb Christianson MD        dextrose 10% bolus 250 mL 250 mL  25 g Intravenous PRN Kaleb Christianson MD        enoxaparin injection 40 mg  40 mg Subcutaneous Daily Lety Heath MD   40 mg at 04/15/24 1711    glucagon (human recombinant) injection 1 mg  1 mg Intramuscular PRN Kaleb Christianson MD        glucose chewable tablet 16 g  16 g Oral PRN Kaleb Christianson MD        glucose chewable tablet 24 g  24 g Oral PRN Kaleb Christianson MD        insulin aspart U-100 pen 0-5 Units  0-5 Units Subcutaneous QID (AC + HS) PRN Kaleb Christianson MD        isosorbide mononitrate 24 hr tablet 30 mg  30 mg Oral Daily Kaleb Christianson MD   30 mg at 04/16/24 0857    levothyroxine tablet 100 mcg  100 mcg Oral Before breakfast Kaleb Christianson MD   100 mcg at 04/16/24 0606    lisinopriL tablet 10 mg  10 mg Oral Daily Omkar Velez MD   10 mg at 04/16/24 0857    melatonin tablet 6 mg  6 mg Oral Nightly PRN Kaleb Christianson MD   6 mg at 04/16/24 0139    metoprolol tartrate (LOPRESSOR) tablet 50 mg  50 mg Oral BID Kaleb Christianson MD   50 mg at 04/16/24 0857    mupirocin 2 % ointment   Nasal BID Lety Heath MD   Given at 04/16/24 0857    naloxone 0.4 mg/mL injection 0.02 mg  0.02 mg Intravenous PRN Kaleb Christianson MD        polyethylene glycol packet 17 g  17 g Oral BID Valencia Barraza MD   17 g at 04/16/24 0857    sodium chloride 0.9% flush 10 mL  10 mL Intravenous Q12H PRN Kaleb Christianson MD        sodium chloride 0.9% irrigation 3,000 mL  3,000 mL  Bladder Instillation Jase Serrano  mL/hr at 04/16/24 0400 3,000 mL at 04/16/24 0400    tamsulosin 24 hr capsule 0.8 mg  2 capsule Oral Daily Kaleb Christianson MD   0.8 mg at 04/16/24 0857       Objective:     Vital Signs (Most Recent):  Temp: 98.4 °F (36.9 °C) (04/16/24 0700)  Pulse: 72 (04/16/24 0700)  Resp: 18 (04/16/24 0700)  BP: (!) 198/79 (04/16/24 0700)  SpO2: (!) 94 % (04/16/24 0700) Vital Signs (24h Range):  Temp:  [98.3 °F (36.8 °C)-98.4 °F (36.9 °C)] 98.4 °F (36.9 °C)  Pulse:  [61-87] 72  Resp:  [18-20] 18  SpO2:  [94 %-97 %] 94 %  BP: (156-198)/(68-96) 198/79     Weight: 79.6 kg (175 lb 7.8 oz)  Body mass index is 27.49 kg/m².    Physical Exam  Vitals and nursing note reviewed. Exam conducted with a chaperone present.   Constitutional:       General: He is not in acute distress.     Appearance: He is obese. He is ill-appearing (chronically).   HENT:      Head: Normocephalic.      Mouth/Throat:      Mouth: Mucous membranes are moist.   Cardiovascular:      Rate and Rhythm: Normal rate.   Pulmonary:      Effort: Pulmonary effort is normal.   Genitourinary:     Comments: Ramirez draining   Musculoskeletal:         General: No signs of injury.      Right lower leg: Edema present.      Left lower leg: Edema present.   Skin:     General: Skin is warm and dry.      Findings: Lesion and rash present. Rash is crusting (to head/ neck).      Comments: See wound care photos,     Neurological:      Mental Status: He is alert. He is oriented at this time     Motor: Weakness present.   Psychiatric:         Attention and Perception: Attention normal.         Mood and Affect: Mood normal.         Speech: Speech normal.         Cognition and Memory:       Review of Symptoms     Symptom Assessment (ESAS 0-10 Scale)  Pain:  0  Dyspnea:  0  Anxiety:  0  Nausea:  0  Depression:  0  Anorexia:  0  Fatigue:  0  Insomnia:  0  Restlessness:  0  Agitation:  0      Performance Status:  30     Living Arrangements:  Lives  with spouse and Lives in home     Psychosocial/Cultural:   See Palliative Psychosocial Note: No  Social Issues Identified: Coping deficit pt/family and New Diagnosis/Trauma  Bereavement Risk: No  Caregiver Needs Discussed. Caregiver Distress: Yes: Issues of guilt, Intensity of family caregiving, and Caregiver Burnout Risk  Cultural: none identified   **Primary  to Follow**  Palliative Care  Consult: No     Spiritual:  F - Nataly and Belief:  Faith      Time-Based Charting:  Yes  Chart Review: 15 minutes  Face to Face: 10 minutes  Symptom Assessment: 10 minutes  Coordination of Care: 5 minutes  Discharge Plannin minutes  Total Time Spent: 57 minutes     Advance Care Planning  Advance Directives:   Living Will: No    [place]: No    Do Not Resuscitate Status: Yes    Medical Power of : No       Decision Making:  Patient answered questions and Family answered questions  Goals of Care: The patient and family endorses that what is most important right now is to focus on spending time at home, avoiding the hospital, remaining as independent as possible, symptom/pain control, quality of life, even if it means sacrificing a little time, improvement in condition but with limits to invasive therapies, and comfort and QOL      Accordingly, we have decided that the best plan to meet the patient's goals includes enrolling in hospice care at time of discharge         Significant Labs: All pertinent labs within the past 24 hours have been reviewed.  CBC:   Recent Labs   Lab 24   WBC 7.89   HGB 12.6*   HCT 39.9*   MCV 93        BMP:  Recent Labs   Lab 24         K 4.2      CO2 25   BUN 21   CREATININE 1.0   CALCIUM 9.0   MG 1.8     LFT:  Lab Results   Component Value Date    AST 26 2024    ALKPHOS 140 (H) 2024    BILITOT 0.6 2024     Albumin:   Albumin   Date Value Ref Range Status   2024 2.3 (L) 3.5 - 5.2 g/dL Final  "    Protein:   Total Protein   Date Value Ref Range Status   04/16/2024 6.2 6.0 - 8.4 g/dL Final     Lactic acid:   No results found for: "LACTATE"    Significant Imaging: I have reviewed all pertinent imaging results/findings within the past 24 hours.  "

## 2024-04-16 NOTE — PLAN OF CARE
Problem: Adult Inpatient Plan of Care  Goal: Plan of Care Review  Outcome: Ongoing, Progressing  Goal: Patient-Specific Goal (Individualized)  Outcome: Ongoing, Progressing  Goal: Absence of Hospital-Acquired Illness or Injury  Outcome: Ongoing, Progressing  Goal: Optimal Comfort and Wellbeing  Outcome: Ongoing, Progressing  Goal: Readiness for Transition of Care  Outcome: Ongoing, Progressing     Problem: Diabetes Comorbidity  Goal: Blood Glucose Level Within Targeted Range  Outcome: Ongoing, Progressing     Problem: Fluid and Electrolyte Imbalance (Acute Kidney Injury/Impairment)  Goal: Fluid and Electrolyte Balance  Outcome: Ongoing, Progressing     Problem: Oral Intake Inadequate (Acute Kidney Injury/Impairment)  Goal: Optimal Nutrition Intake  Outcome: Ongoing, Progressing     Problem: Renal Function Impairment (Acute Kidney Injury/Impairment)  Goal: Effective Renal Function  Outcome: Ongoing, Progressing     Problem: Skin Injury Risk Increased  Goal: Skin Health and Integrity  Outcome: Ongoing, Progressing     Problem: Hypertension Comorbidity  Goal: Blood Pressure in Desired Range  Outcome: Ongoing, Progressing     Problem: Impaired Wound Healing  Goal: Optimal Wound Healing  Outcome: Ongoing, Progressing     Problem: Fall Injury Risk  Goal: Absence of Fall and Fall-Related Injury  Outcome: Ongoing, Progressing     Problem: Infection  Goal: Absence of Infection Signs and Symptoms  Outcome: Ongoing, Progressing     Problem: Coping Ineffective  Goal: Effective Coping  Outcome: Ongoing, Progressing

## 2024-04-16 NOTE — PROGRESS NOTES
[facility]  Palliative Medicine  Progress Note    Patient Name: Claude T Dupuis  MRN: [id number]  Admission Date: 4/13/2024  Hospital Length of Stay: 2 days  Code Status: DNR   Attending Provider: Lety Heath MD  Consulting Provider: Mirlande Harrell NP  Primary Care Physician: Seb Gray MD  Principal Problem:KANCHAN (acute kidney injury)    Patient information was obtained from patient, relative(s), past medical records, and primary team.      Assessment/Plan:     Palliative Care  Palliative care encounter  Mr Cox is a 89 y/oM  with past medical history of CAD s/p CABG 2007, dementia, HTN, HLD, PAD, venous stasis ulcers, and penile meatus stricture who presented on 4/13/2024 for abdominal pain x 1 day related to urinary retention. At baseline, pt in lives with his wife,is recently bed/wheelchair bound, able to feed self, but otherwise dependent on ADLs. Pt recently admitted on 4/4/2023 for acute urinary retention 2/2 meatal stricture and KANCHAN. Family noted poor urine output of koroma for last 24hrs and pt began experiencing severe suprapubic pain prompting family to bring patient to ED. Staff able to manipulate koroma and pt now passing urine with relief of pain. Palliative care consulted for goals of care and end of life discussion including hospice.       4/16/2024  -Followed up with pt and family at the bedside this morning. Pt more coherent and able to recall events leading up to admission.  Pt reiterates that he is ready to get back home to his wife and family.    -Pts son at the bedside reports that family held discussion yesterday and they have decided to bring the pt home under the care of home hospice.  Consents were signed yesterday.  Equipment was delivered to pts home last night.    -All questions addressed at this time.  Palliative care will sign off at this time.  Pt will discharge under the care of home hospice today.  Pt and son at bedside in agreement with plan.        4/15/2024  -At time of  "initial encounter, pt resting in bed with son Anton Cox and grandson Pedro at the bedside.  Pt oriented to self and location, unsure of exact events leading up to admission.  Pt denies any pain or discomfort at this time. Majority of information obtained via son.  Pt lives at home with his wife.  Up until the last few months, pt remained quite active and involved.  Pt has drastically decline physically and mentally over the past month.  Pt with 4 ED visits within the past month as well.   Family has experienced difficulty finding medical support/ equipment to meet the pts needs at home.  -Discussed GOC moving forward. Son reports that the highest priority is making sure the pts needs are met, the pt remains free from pain and that the pt remains at home with his wife of 70 plus years if possible.  They acknowledge the pts decline and recognize that he is entering a different phase in his life.  I Introduced the topic of home hospice and what this option could offer the family.   Pts grandson familiar with hospice services as he recently had a loved one under the care of home hospice.   -Pts son agreeable to meet with home hospice representative.  CM notified and will set up meeting. Primary team notified as well.   -Pt DNR status confirmed.        I will sign off. Please contact us if you have any additional questions.    Subjective:     Chief Complaint:   Chief Complaint   Patient presents with    Urinary Retention     Arrived with koroma catheter in place. Pt has only put out 100 ml urine since 6 a.m. Urine red. EMT gave pt 100 ml NS.       HPI:   Per chart, "Claude T Dupuis is a 89 y.o. male with past medical history of CAD s/p CABG 2007, dementia, HTN, HLD, PAD, venous stasis ulcers, and penile meatus stricture who presented on 4/13/2024 for abdominal pain x 1 day.     Of note, patient with dementia and poor memory. HPI primarily attained through son at bedside and EMR review.     The patient was in their usual " "state of health which includes living with family, bed/wheelchair bound, able to feed self, but otherwise dependent on ADLs. Pt recently admitted on 4/4/2023 for acute urinary retention 2/2 meatal stricture and GEORGE. George improved and patient discharge with koroma and plans to perform voiding trial through home health. Family noted poor urine output of koroma for last 24hrs and pt began experiencing severe suprapubic pain prompting family to bring patient to ED. Found to have GEORGE and active urinary retention in ED. Staff able to manipulate koroma and pt now passing urine with relief of pain. [facility] consulted for admission due to post-obstructive GEORGE.     On exam, pt comfortable appearing without focal complaints. Denies any associated fevers, chills, nausea, or vomiting."         Interval History: Pt with some slight agitation overnight, since resolved.  Pt will discharge home under the care of home hospice today.     Medications:  Continuous Infusions:  Current Facility-Administered Medications   Medication Dose Route Frequency Provider Last Rate Last Admin    acetaminophen tablet 650 mg  650 mg Oral Q6H PRN Kaleb Christianson MD        balsam peru-castor oiL Oint   Topical (Top) BID Lety Heath MD   Given at 04/16/24 0857    dextrose 10% bolus 125 mL 125 mL  12.5 g Intravenous PRN Kaleb Christianson MD        dextrose 10% bolus 250 mL 250 mL  25 g Intravenous PRN Kaleb Christianson MD        enoxaparin injection 40 mg  40 mg Subcutaneous Daily Lety Heath MD   40 mg at 04/15/24 1711    glucagon (human recombinant) injection 1 mg  1 mg Intramuscular PRN Kaleb Christianson MD        glucose chewable tablet 16 g  16 g Oral PRN Kaleb Christianson MD        glucose chewable tablet 24 g  24 g Oral PRN Kaleb Christianson MD        insulin aspart U-100 pen 0-5 Units  0-5 Units Subcutaneous QID (AC + HS) PRKaleb Ennis MD        isosorbide mononitrate 24 hr tablet 30 mg  30 mg Oral Daily Kaleb Christianson MD   30 mg at 04/16/24 0857    levothyroxine " tablet 100 mcg  100 mcg Oral Before breakfast Kaleb Christianson MD   100 mcg at 04/16/24 0606    lisinopriL tablet 10 mg  10 mg Oral Daily Omkar Velez MD   10 mg at 04/16/24 0857    melatonin tablet 6 mg  6 mg Oral Nightly PRN Kaleb Christianson MD   6 mg at 04/16/24 0139    metoprolol tartrate (LOPRESSOR) tablet 50 mg  50 mg Oral BID Kaleb Christianson MD   50 mg at 04/16/24 0857    mupirocin 2 % ointment   Nasal BID Lety Heath MD   Given at 04/16/24 0857    naloxone 0.4 mg/mL injection 0.02 mg  0.02 mg Intravenous PRN Kaleb Christianson MD        polyethylene glycol packet 17 g  17 g Oral BID Valencia Barraza MD   17 g at 04/16/24 0857    sodium chloride 0.9% flush 10 mL  10 mL Intravenous Q12H PRN Kaleb Christianson MD        sodium chloride 0.9% irrigation 3,000 mL  3,000 mL Bladder Instillation Jase Serrano  mL/hr at 04/16/24 0400 3,000 mL at 04/16/24 0400    tamsulosin 24 hr capsule 0.8 mg  2 capsule Oral Daily Kaleb Christianson MD   0.8 mg at 04/16/24 0857     Scheduled Meds:  Current Facility-Administered Medications   Medication Dose Route Frequency Provider Last Rate Last Admin    acetaminophen tablet 650 mg  650 mg Oral Q6H PRN Kaleb Christianson MD        balsam peru-castor oiL Oint   Topical (Top) BID Lety Heath MD   Given at 04/16/24 0857    dextrose 10% bolus 125 mL 125 mL  12.5 g Intravenous PRN Kaleb Christianson MD        dextrose 10% bolus 250 mL 250 mL  25 g Intravenous PRN Kaleb Christianson MD        enoxaparin injection 40 mg  40 mg Subcutaneous Daily Lety Heath MD   40 mg at 04/15/24 1711    glucagon (human recombinant) injection 1 mg  1 mg Intramuscular PRN Kaleb Christianson MD        glucose chewable tablet 16 g  16 g Oral PRN Kaleb Christianson MD        glucose chewable tablet 24 g  24 g Oral PRN Kaleb Christianson MD        insulin aspart U-100 pen 0-5 Units  0-5 Units Subcutaneous QID (AC + HS) PRN Kaleb Christianson MD        isosorbide mononitrate 24 hr tablet 30 mg  30 mg Oral Daily Kaleb Christianson MD   30 mg at 04/16/24 0834     levothyroxine tablet 100 mcg  100 mcg Oral Before breakfast Kaelb Christianson MD   100 mcg at 04/16/24 0606    lisinopriL tablet 10 mg  10 mg Oral Daily Omkar Velez MD   10 mg at 04/16/24 0857    melatonin tablet 6 mg  6 mg Oral Nightly PRN Kaleb Christianson MD   6 mg at 04/16/24 0139    metoprolol tartrate (LOPRESSOR) tablet 50 mg  50 mg Oral BID Kaleb Christianson MD   50 mg at 04/16/24 0857    mupirocin 2 % ointment   Nasal BID Lety Heath MD   Given at 04/16/24 0857    naloxone 0.4 mg/mL injection 0.02 mg  0.02 mg Intravenous PRN Kaleb Christianson MD        polyethylene glycol packet 17 g  17 g Oral BID Valencia Barraza MD   17 g at 04/16/24 0857    sodium chloride 0.9% flush 10 mL  10 mL Intravenous Q12H PRN Kaleb Christianson MD        sodium chloride 0.9% irrigation 3,000 mL  3,000 mL Bladder Instillation Jase Serrano  mL/hr at 04/16/24 0400 3,000 mL at 04/16/24 0400    tamsulosin 24 hr capsule 0.8 mg  2 capsule Oral Daily Kaleb Christianson MD   0.8 mg at 04/16/24 0857     PRN Meds:  Current Facility-Administered Medications   Medication Dose Route Frequency Provider Last Rate Last Admin    acetaminophen tablet 650 mg  650 mg Oral Q6H PRN Sammy, Kaleb, MD        balsam peru-castor oiL Oint   Topical (Top) BID Lety Heath MD   Given at 04/16/24 0857    dextrose 10% bolus 125 mL 125 mL  12.5 g Intravenous PRN Kaleb Christianson MD        dextrose 10% bolus 250 mL 250 mL  25 g Intravenous PRN Kaleb Christianson MD        enoxaparin injection 40 mg  40 mg Subcutaneous Daily Lety Heath MD   40 mg at 04/15/24 1711    glucagon (human recombinant) injection 1 mg  1 mg Intramuscular PRN Kaleb Christianson MD        glucose chewable tablet 16 g  16 g Oral PRN Kaleb Christianson MD        glucose chewable tablet 24 g  24 g Oral PRN Kaleb Christianson MD        insulin aspart U-100 pen 0-5 Units  0-5 Units Subcutaneous QID (AC + HS) PRN Kaleb Christianson MD        isosorbide mononitrate 24 hr tablet 30 mg  30 mg Oral Daily Kaleb Christianson MD   30 mg at 04/16/24  0857    levothyroxine tablet 100 mcg  100 mcg Oral Before breakfast Kaleb Christianson MD   100 mcg at 04/16/24 0606    lisinopriL tablet 10 mg  10 mg Oral Daily Omkar Velez MD   10 mg at 04/16/24 0857    melatonin tablet 6 mg  6 mg Oral Nightly PRN Kaleb Christianson MD   6 mg at 04/16/24 0139    metoprolol tartrate (LOPRESSOR) tablet 50 mg  50 mg Oral BID Kaleb Christianson MD   50 mg at 04/16/24 0857    mupirocin 2 % ointment   Nasal BID Lety Heath MD   Given at 04/16/24 0857    naloxone 0.4 mg/mL injection 0.02 mg  0.02 mg Intravenous PRN Kaleb Christianson MD        polyethylene glycol packet 17 g  17 g Oral BID Valencia Barraza MD   17 g at 04/16/24 0857    sodium chloride 0.9% flush 10 mL  10 mL Intravenous Q12H PRN Kaleb Christianson MD        sodium chloride 0.9% irrigation 3,000 mL  3,000 mL Bladder Instillation Jase Serrano  mL/hr at 04/16/24 0400 3,000 mL at 04/16/24 0400    tamsulosin 24 hr capsule 0.8 mg  2 capsule Oral Daily Kaleb Christianson MD   0.8 mg at 04/16/24 0857       Objective:     Vital Signs (Most Recent):  Temp: 98.4 °F (36.9 °C) (04/16/24 0700)  Pulse: 72 (04/16/24 0700)  Resp: 18 (04/16/24 0700)  BP: (!) 198/79 (04/16/24 0700)  SpO2: (!) 94 % (04/16/24 0700) Vital Signs (24h Range):  Temp:  [98.3 °F (36.8 °C)-98.4 °F (36.9 °C)] 98.4 °F (36.9 °C)  Pulse:  [61-87] 72  Resp:  [18-20] 18  SpO2:  [94 %-97 %] 94 %  BP: (156-198)/(68-96) 198/79     Weight: 79.6 kg (175 lb 7.8 oz)  Body mass index is 27.49 kg/m².    Physical Exam  Vitals and nursing note reviewed. Exam conducted with a chaperone present.   Constitutional:       General: He is not in acute distress.     Appearance: He is obese. He is ill-appearing (chronically).   HENT:      Head: Normocephalic.      Mouth/Throat:      Mouth: Mucous membranes are moist.   Cardiovascular:      Rate and Rhythm: Normal rate.   Pulmonary:      Effort: Pulmonary effort is normal.   Genitourinary:     Comments: Ramirez draining   Musculoskeletal:         General:  No signs of injury.      Right lower leg: Edema present.      Left lower leg: Edema present.   Skin:     General: Skin is warm and dry.      Findings: Lesion and rash present. Rash is crusting (to head/ neck).      Comments: See wound care photos,     Neurological:      Mental Status: He is alert. He is oriented at this time     Motor: Weakness present.   Psychiatric:         Attention and Perception: Attention normal.         Mood and Affect: Mood normal.         Speech: Speech normal.         Cognition and Memory:       Review of Symptoms     Symptom Assessment (ESAS 0-10 Scale)  Pain:  0  Dyspnea:  0  Anxiety:  0  Nausea:  0  Depression:  0  Anorexia:  0  Fatigue:  0  Insomnia:  0  Restlessness:  0  Agitation:  0      Performance Status:  30     Living Arrangements:  Lives with spouse and Lives in home     Psychosocial/Cultural:   See Palliative Psychosocial Note: No  Social Issues Identified: Coping deficit pt/family and New Diagnosis/Trauma  Bereavement Risk: No  Caregiver Needs Discussed. Caregiver Distress: Yes: Issues of guilt, Intensity of family caregiving, and Caregiver Burnout Risk  Cultural: none identified   **Primary  to Follow**  Palliative Care  Consult: No     Spiritual:  F - Nataly and Belief:  [employer]apt[employer]      Time-Based Charting:  Yes  Chart Review: 15 minutes  Face to Face: 10 minutes  Symptom Assessment: 10 minutes  Coordination of Care: 5 minutes  Discharge Plannin minutes  Total Time Spent: 57 minutes     Advance Care Planning  Advance Directives:   Living Will: No    LaPOST: No    Do Not Resuscitate Status: Yes    Medical Power of : No       Decision Making:  Patient answered questions and Family answered questions  Goals of Care: The patient and family endorses that what is most important right now is to focus on spending time at home, avoiding the hospital, remaining as independent as possible, symptom/pain control, quality of life, even if it means  "sacrificing a little time, improvement in condition but with limits to invasive therapies, and comfort and QOL      Accordingly, we have decided that the best plan to meet the patient's goals includes enrolling in hospice care at time of discharge         Significant Labs: All pertinent labs within the past 24 hours have been reviewed.  CBC:   Recent Labs   Lab 04/16/24 0449   WBC 7.89   HGB 12.6*   HCT 39.9*   MCV 93        BMP:  Recent Labs   Lab 04/16/24 0449         K 4.2      CO2 25   BUN 21   CREATININE 1.0   CALCIUM 9.0   MG 1.8     LFT:  Lab Results   Component Value Date    AST 26 04/16/2024    ALKPHOS 140 (H) 04/16/2024    BILITOT 0.6 04/16/2024     Albumin:   Albumin   Date Value Ref Range Status   04/16/2024 2.3 (L) 3.5 - 5.2 g/dL Final     Protein:   Total Protein   Date Value Ref Range Status   04/16/2024 6.2 6.0 - 8.4 g/dL Final     Lactic acid:   No results found for: "LACTATE"    Significant Imaging: I have reviewed all pertinent imaging results/findings within the past 24 hours.    Mirlande Harrell NP  Palliative Medicine  [facility]            "

## 2024-04-16 NOTE — ASSESSMENT & PLAN NOTE
- strict I's and O's  - trend creatinine  - maintain Ramirez catheter for now, CBI clamped and plugged this morning given urine remained clear yellow  - nursing to irrigate catheter p.r.n.  - no other urologic interventions this admission  - recommend goals of care conversation with family, previously declined all urologic interventions and evaluations  - please reach out with questions or concerns

## 2024-04-16 NOTE — PLAN OF CARE
Medical Readiness for Discharge  Medically ready anticipated for 4/16/2024.  Updated by Dede Childs RN on 4/16/2024 at 1013.  Expected Discharge: 4/16/2024Afternoon  Expected Discharge Comment:Family is noted to have signed with hospice. Pending DME and orders. Assigned CM to finalize DC.    Future Appointments   Date Time Provider Department Center   4/18/2024  9:00 AM Seb Gray MD [facility] [place]   5/1/2024  4:00 PM Aneta Olmstead PA-C [facility] [address]   8/26/2024  1:00 PM Seb Gray MD [facility] [place]       Orders Placed This Encounter   Procedures    Ambulatory referral/consult to [facility] Palliative Care     Standing Status:   Future     Standing Expiration Date:   5/15/2025     Referral Priority:   Routine     Referral Type:   Consultation     Requested Specialty:   Palliative Medicine     Number of Visits Requested:   1

## 2024-04-16 NOTE — PROGRESS NOTES
"[facility] Hospital Medicine Progress note    Admitting Team: [facility] Hospitalist Team A  Attending Physician: Lety Heath MD  Resident: Dr. Velez    Date of Admit: 4/13/2024    Chief Complaint     Stomach pain x 1 day    Subjective:      Had some pain in lower stomach yesterday afternoon that has since resolved.  Currently having no symptoms, urine is flowing, and bladder has been irrigated.       Objective       Physical Examination:    /68 (BP Location: Right arm)   Pulse 80   Temp 98.3 °F (36.8 °C) (Oral)   Resp 16   Ht 5' 7" (1.702 m)   Wt 79.6 kg (175 lb 7.8 oz)   SpO2 95%   BMI 27.49 kg/m²      General: A&Ox3, resting comfortably in bed, poor memory, unable to recall recent events  HEENT: EOMI, moist mucus membranes w/ no erythema noted, no facial asymmetry noted  Neck: Trachea midline, no masses, no lymphadenopathy  Cardiovascular: Regular rate and rhythm, normal S1 and S2, no murmurs, rubs or gallops  Pulm: No respiratory distress  Abdomen: Soft, non-tender, nn-distended; no organomegaly  : Ramirez in place with active urine flow, pink tinged  Skin: several ulcers on lower extremeties, tense bullae on R shin  Extremities: BLE ulcers, not infected appearing, pitting edema in bilatearl lower extremities            Pulses: 1+ LE pulses  Neurological: A&Ox3, 5/5 upper and lower extremity strength, face midline, smile symmetric  Psychiatric: Normal mood and affect, thought content normal    Laboratory:  Recent Labs   Lab 04/09/24  1416 04/13/24  2105 04/13/24  2132 04/14/24  0521   WBC 9.23 15.35*  --  15.09*   HGB 12.7* 13.6*  --  13.1*    315  --  333   MCV 90 87  --  90     --  133* 138   K 3.8  --  4.4 4.2     --  97 102   CO2 28  --  22* 26   BUN 12  --  29 27   CREATININE 1.0  --  2.1* 1.7*   *  --  175* 188*   CALCIUM 8.8  --  9.0 8.8   PROT 6.0  --  6.7 6.3   ALBUMIN 2.4*  --  2.4* 2.2*   PHOS  --   --   --  3.5   MG  --   --   --  1.8   AST 20  --  34 27   ALT 18  -- "  27 22   ALKPHOS 145*  --  173* 153*       All laboratory data reviewed    Microbiology Data: Ucx pending    EKG Data: None    Radiology Data: CXR negative     Assessment/Plan     Claude T Dupuis is a 89 y.o. male with past medical history of CAD s/p CABG 2007, dementia, HTN, HLD, PAD, venous stasis ulcers, and penile meatus stricture who presented on 4/13/2024 for abdominal pain x 1 day. Patient is admitted to [facility] for post-obstructive KANCHAN 2/2 acute urinary retention from koroma malfunction.     Acute Urinary Retention  Indwelling koroma present on admission  -Gentle hydration with IVF, monitor for post-obstructive diuresis  -Urology consulted; recommend koroma irrigation and no antibiotics  - CT abdomen did not show any clots in bladder  - Per urology continue koroma, no other urologic interventions    KANCHAN, likely post-obstructive, resolved  Hematuria  Pyuria/Bacteruria  Admit Cr 2.1, elevated from 1 four days prior. UA with above findings. Pt's symptoms including elevated WBC likely stemming from acute stress response to retention. Pt currently without any pain.  -CTM, low threshold to start abx if fevers or WBC worsens  -Improving post relief of obstruction, CTM    Failure to thrive  Low Albumin  Cognitive Decline  -PT/OT ordered for functional assessment  -Discussions with case management for placement options  -DC for home with hospice    Chronic LE Ulcers  - wound care while inpatient.  - Pain management if needed  - Does not look acutely infected, holding off on abx    Hypothyroidism  -TSH 10.46 seven days prior, questionable utility due drawn during acute healthcare event  -Continue home synthroid 100mcg     Chronic Hypertension  -Continue home imdur and lopressor    DMII  A1c 7.4 on 3/2024, no home meds  -SSI while inpatient    Hx of CABG 2007, CAD  HLD  -No acute issues, stopped statin/asa due to age    Healthcare maintenance   -primary care provider is Seb Gray MD     Diet: Cardiac  VTE PPx:  Lovenox  Code Status: DNR,    Dispo: pending placement likely home w hospice today    Omkar Velez MD  [facility]  [facility]

## 2024-04-16 NOTE — PLAN OF CARE
Pt AAO x1.  VSS.  Pt remained afebrile throughout this shift.   Meds administered per order.   Pt remained free of falls this shift.   Wound care consult completed. Orders entered. Wound care to LE and Left Buttocks.   3 Way koroma catheter inserted per Urology. CBi till 4am , titrate till light pink. Currently voiding light pink urine, no clots noted. Tolerated well.     Pain meds administered as ordered. Nausea and emesis x1 noted after am pain med dose.   Blood glucose monitored, corrected via SSI. Poor appetite noted   Plan of care reviewed with family at bedside. Per Son Anton patient will d/c with [facility].   Pt moving/turing with assistance. Frequent weight shifting encouraged. Z flex boots on. Air mattress in place.   Bed low, side rails up x 2, wheels locked, call light in reach.   Bed alarm maintained for safety.   Patient instructed to call for assistance.   Will continue to monitor.     Problem: Adult Inpatient Plan of Care  Goal: Plan of Care Review  4/15/2024 1946 by Sarah Gary RN  Outcome: Ongoing, Progressing  4/15/2024 1945 by Sarah Gary RN  Outcome: Ongoing, Progressing  Goal: Patient-Specific Goal (Individualized)  4/15/2024 1946 by Sarah Gary, PANFILO  Outcome: Ongoing, Progressing  4/15/2024 1945 by Sarah Gary, RN  Outcome: Ongoing, Progressing  Goal: Absence of Hospital-Acquired Illness or Injury  Outcome: Ongoing, Progressing  Goal: Optimal Comfort and Wellbeing  Outcome: Ongoing, Progressing  Goal: Readiness for Transition of Care  Outcome: Ongoing, Progressing

## 2024-04-16 NOTE — NURSING
Patient was found weeping in his room, when asked what was going on, he said he was just very emotional and thankful that his family is taking care of him so well, patient care ongoing.

## 2024-04-17 ENCOUNTER — HOSPITAL ENCOUNTER (EMERGENCY)
Facility: HOSPITAL | Age: 89
Discharge: HOME | End: 2024-04-17
Attending: EMERGENCY MEDICINE
Payer: MEDICARE

## 2024-04-17 VITALS
BODY MASS INDEX: 28.25 KG/M2 | RESPIRATION RATE: 18 BRPM | TEMPERATURE: 98 F | OXYGEN SATURATION: 98 % | DIASTOLIC BLOOD PRESSURE: 86 MMHG | WEIGHT: 180 LBS | HEIGHT: 67 IN | HEART RATE: 61 BPM | SYSTOLIC BLOOD PRESSURE: 192 MMHG

## 2024-04-17 DIAGNOSIS — R33.8 ACUTE URINARY RETENTION: Primary | ICD-10-CM

## 2024-04-17 DIAGNOSIS — R31.9 HEMATURIA, UNSPECIFIED TYPE: ICD-10-CM

## 2024-04-17 LAB
ALBUMIN SERPL BCP-MCNC: 2.5 G/DL (ref 3.5–5.2)
ALP SERPL-CCNC: 140 U/L (ref 55–135)
ALT SERPL W/O P-5'-P-CCNC: 22 U/L (ref 10–44)
ANION GAP SERPL CALC-SCNC: 12 MMOL/L (ref 8–16)
AST SERPL-CCNC: 31 U/L (ref 10–40)
BASOPHILS # BLD AUTO: 0.04 K/UL (ref 0–0.2)
BASOPHILS NFR BLD: 0.5 % (ref 0–1.9)
BILIRUB SERPL-MCNC: 0.7 MG/DL (ref 0.1–1)
BILIRUB UR QL STRIP: NEGATIVE
BUN SERPL-MCNC: 20 MG/DL (ref 10–30)
CALCIUM SERPL-MCNC: 8.9 MG/DL (ref 8.7–10.5)
CHLORIDE SERPL-SCNC: 102 MMOL/L (ref 95–110)
CLARITY UR: CLEAR
CO2 SERPL-SCNC: 27 MMOL/L (ref 23–29)
COLOR UR: YELLOW
CREAT SERPL-MCNC: 1.2 MG/DL (ref 0.5–1.4)
DIFFERENTIAL METHOD BLD: ABNORMAL
EOSINOPHIL # BLD AUTO: 0.4 K/UL (ref 0–0.5)
EOSINOPHIL NFR BLD: 4 % (ref 0–8)
ERYTHROCYTE [DISTWIDTH] IN BLOOD BY AUTOMATED COUNT: 13.9 % (ref 11.5–14.5)
EST. GFR  (NO RACE VARIABLE): 56 ML/MIN/1.73 M^2
GLUCOSE SERPL-MCNC: 150 MG/DL (ref 70–110)
GLUCOSE UR QL STRIP: NEGATIVE
HCT VFR BLD AUTO: 40.6 % (ref 40–54)
HGB BLD-MCNC: 13.5 G/DL (ref 14–18)
HGB UR QL STRIP: ABNORMAL
IMM GRANULOCYTES # BLD AUTO: 0.12 K/UL (ref 0–0.04)
IMM GRANULOCYTES NFR BLD AUTO: 1.4 % (ref 0–0.5)
KETONES UR QL STRIP: NEGATIVE
LEUKOCYTE ESTERASE UR QL STRIP: ABNORMAL
LYMPHOCYTES # BLD AUTO: 1.5 K/UL (ref 1–4.8)
LYMPHOCYTES NFR BLD: 17.1 % (ref 18–48)
MCH RBC QN AUTO: 30 PG (ref 27–31)
MCHC RBC AUTO-ENTMCNC: 33.3 G/DL (ref 32–36)
MCV RBC AUTO: 90 FL (ref 82–98)
MICROSCOPIC COMMENT: ABNORMAL
MONOCYTES # BLD AUTO: 0.7 K/UL (ref 0.3–1)
MONOCYTES NFR BLD: 7.4 % (ref 4–15)
NEUTROPHILS # BLD AUTO: 6.1 K/UL (ref 1.8–7.7)
NEUTROPHILS NFR BLD: 69.6 % (ref 38–73)
NITRITE UR QL STRIP: NEGATIVE
NRBC BLD-RTO: 0 /100 WBC
PH UR STRIP: 6 [PH] (ref 5–8)
PLATELET # BLD AUTO: 330 K/UL (ref 150–450)
PMV BLD AUTO: 9.2 FL (ref 9.2–12.9)
POCT GLUCOSE: 182 MG/DL (ref 70–110)
POTASSIUM SERPL-SCNC: 3.7 MMOL/L (ref 3.5–5.1)
PROT SERPL-MCNC: 6.7 G/DL (ref 6–8.4)
PROT UR QL STRIP: ABNORMAL
RBC # BLD AUTO: 4.5 M/UL (ref 4.6–6.2)
RBC #/AREA URNS HPF: >100 /HPF (ref 0–4)
SODIUM SERPL-SCNC: 141 MMOL/L (ref 136–145)
SP GR UR STRIP: 1.01 (ref 1–1.03)
URN SPEC COLLECT METH UR: ABNORMAL
UROBILINOGEN UR STRIP-ACNC: NEGATIVE EU/DL
WBC # BLD AUTO: 8.78 K/UL (ref 3.9–12.7)
WBC #/AREA URNS HPF: 7 /HPF (ref 0–5)

## 2024-04-17 PROCEDURE — 81000 URINALYSIS NONAUTO W/SCOPE: CPT | Performed by: NURSE PRACTITIONER

## 2024-04-17 PROCEDURE — 25000003 PHARM REV CODE 250: Performed by: NURSE PRACTITIONER

## 2024-04-17 PROCEDURE — 80053 COMPREHEN METABOLIC PANEL: CPT | Performed by: NURSE PRACTITIONER

## 2024-04-17 PROCEDURE — 85025 COMPLETE CBC W/AUTO DIFF WBC: CPT | Performed by: NURSE PRACTITIONER

## 2024-04-17 PROCEDURE — 82962 GLUCOSE BLOOD TEST: CPT

## 2024-04-17 PROCEDURE — 99283 EMERGENCY DEPT VISIT LOW MDM: CPT

## 2024-04-17 RX ORDER — ACETAMINOPHEN 500 MG
500 TABLET ORAL EVERY 6 HOURS PRN
COMMUNITY

## 2024-04-17 RX ORDER — LORAZEPAM 0.5 MG/1
0.5 TABLET ORAL 4 TIMES DAILY PRN
COMMUNITY
Start: 2024-04-16

## 2024-04-17 RX ORDER — ISOSORBIDE MONONITRATE 30 MG/1
30 TABLET, EXTENDED RELEASE ORAL ONCE
Status: COMPLETED | OUTPATIENT
Start: 2024-04-17 | End: 2024-04-17

## 2024-04-17 RX ORDER — METOPROLOL TARTRATE 50 MG/1
50 TABLET ORAL
Status: COMPLETED | OUTPATIENT
Start: 2024-04-17 | End: 2024-04-17

## 2024-04-17 RX ORDER — LIDOCAINE HYDROCHLORIDE 20 MG/ML
JELLY TOPICAL
Status: COMPLETED | OUTPATIENT
Start: 2024-04-17 | End: 2024-04-17

## 2024-04-17 RX ADMIN — ISOSORBIDE MONONITRATE 30 MG: 30 TABLET, EXTENDED RELEASE ORAL at 10:04

## 2024-04-17 RX ADMIN — METOPROLOL TARTRATE 50 MG: 50 TABLET, FILM COATED ORAL at 10:04

## 2024-04-17 RX ADMIN — LIDOCAINE HYDROCHLORIDE: 20 JELLY TOPICAL at 08:04

## 2024-04-17 NOTE — ED NOTES
Tech notified me that patient BP elevated more than previous, secure chat Dr Guillaume and his nurse to let them know. Patient is discharge, just waiting on a ride.

## 2024-04-17 NOTE — PHARMACY MED REC
"Admission Medication History     The home medication history was taken by Tiffany Haley CPhT.    Medication history obtained from, Patient's Daughter Verified    You may go to "Admission" then "Reconcile Home Medications" tabs to review and/or act upon these items.     The home medication list has been updated by the Pharmacy department.   Please read ALL comments highlighted in yellow.   Please address this information as you see fit.    Feel free to contact us if you have any questions or require assistance.        Tiffany Haley CPhT.  Ext [phone]               .          "

## 2024-04-17 NOTE — DISCHARGE INSTRUCTIONS
Thank you for choosing [facility]! We appreciate you coming to us for your medical care. We hope you feel better soon! Please come back to [facility] for all of your future medical needs.      Our goal in the emergency department is to always give you outstanding care and exceptional service. You may receive a survey by mail or e-mail in the next week regarding your experience in our ED. We would greatly appreciate your completing and returning the survey. Your feedback provides us with a way to recognize our staff who give very good care and it helps us learn how to improve when your experience was below our aspiration of excellence.      Sincerely,  MAXWELL Chopra  [facility]

## 2024-04-17 NOTE — ED NOTES
Patient resting in stretcher comfortably at this time with no reported needs at this time. Will continue to monitor.

## 2024-04-17 NOTE — ED PROVIDER NOTES
Encounter Date: 4/17/2024       History     Chief Complaint   Patient presents with    Urinary Retention     Brought to ED from home for bladder pressure and urinary retention since last night. Pt does not currently have a koroma in place.      89yo male here for urinary retention. Pt was   Discharged yesterday from this facility after admission for KANCHAN due to urinary obstruction.  Pt was discharged without koroma cath and awoke around 0400 today stating that he could not urinate. Pt is currently on Flomax. No fever or vomiting. No other complaints at this time.     The history is provided by the patient, medical records and a relative.     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    Hypertension     PAD (peripheral artery disease) 09/28/2022    Type 2 diabetes mellitus without complications      Past Surgical History:   Procedure Laterality Date    COLON SURGERY  2005    CORONARY ARTERY BYPASS GRAFT  2007    EYE SURGERY      VEIN BYPASS SURGERY       Family History   Problem Relation Name Age of Onset    Diabetes Mother Radha Cox      Social History     Tobacco Use    Smoking status: Never     Passive exposure: Never    Smokeless tobacco: Never   Substance Use Topics    Alcohol use: No    Drug use: No     Review of Systems   Unable to perform ROS: Dementia   Constitutional:  Positive for activity change. Negative for fever.   Genitourinary:  Positive for decreased urine volume and difficulty urinating.       Physical Exam     Initial Vitals [04/17/24 0815]   BP Pulse Resp Temp SpO2   118/68 81 17 98.3 °F (36.8 °C) 96 %      MAP       --         Physical Exam    Nursing note and vitals reviewed.  Constitutional: Vital signs are normal. He appears  well-developed and well-nourished. He is active and cooperative. He is easily aroused.  Non-toxic appearance. He does not have a sickly appearance. He does not appear ill. No distress.   Chronically ill appearing   HENT:   Head: Normocephalic and atraumatic.   Mouth/Throat: Mucous membranes are normal.   Eyes: Conjunctivae are normal.   Neck: Phonation normal.   Normal range of motion.  Cardiovascular:  Normal rate, regular rhythm and normal heart sounds.           Pulmonary/Chest: Effort normal and breath sounds normal.   Abdominal: Abdomen is soft. Bowel sounds are normal. He exhibits no distension. There is no abdominal tenderness.   Suprapubic tenderness and distention. There is no rigidity, no rebound and no guarding.   Musculoskeletal:      Cervical back: Normal range of motion.     Neurological: He is alert, oriented to person, place, and time and easily aroused. He has normal strength. Coordination normal. GCS eye subscore is 4. GCS verbal subscore is 5. GCS motor subscore is 6.   Skin: Skin is warm, dry and intact. No bruising and no rash noted.   Psychiatric: He has a normal mood and affect. His speech is normal and behavior is normal. Judgment and thought content normal. Cognition and memory are normal.         ED Course   Procedures  Labs Reviewed   URINALYSIS, REFLEX TO URINE CULTURE - Abnormal; Notable for the following components:       Result Value    Protein, UA Trace (*)     Occult Blood UA 3+ (*)     Leukocytes, UA Trace (*)     All other components within normal limits    Narrative:     Specimen Source->Urine   CBC W/ AUTO DIFFERENTIAL - Abnormal; Notable for the following components:    RBC 4.50 (*)     Hemoglobin 13.5 (*)     Immature Granulocytes 1.4 (*)     Immature Grans (Abs) 0.12 (*)     Lymph % 17.1 (*)     All other components within normal limits   COMPREHENSIVE METABOLIC PANEL - Abnormal; Notable for the following components:    Glucose 150 (*)     Albumin 2.5 (*)     Alkaline  "Phosphatase 140 (*)     eGFR 56 (*)     All other components within normal limits   URINALYSIS MICROSCOPIC - Abnormal; Notable for the following components:    RBC, UA >100 (*)     WBC, UA 7 (*)     All other components within normal limits    Narrative:     Specimen Source->Urine   POCT GLUCOSE - Abnormal; Notable for the following components:    POCT Glucose 182 (*)     All other components within normal limits   POCT GLUCOSE MONITORING CONTINUOUS          Imaging Results    None          Medications   LIDOcaine HCl 2% urojet ( Mucous Membrane Given 4/17/24 0845)     Medical Decision Making  89yo male here for urinary retention. Pt discharged yesterday after admission for KANCHAN due to urinary retention. He was discharged without koroma. Pt's family member states, 'he woke up around 0400 and was saying he couldn't go."  Pt is alert, mild suprapubic tenderness with urinary bladder distention noted.  Bladder scan revealed over 600 mL in bladder.  Koroma cath was placed and nearly 700ml of blood tinged urine was removed.  Renal function at baseline. Urine cultures from previous visits negative for growth to date. Pt will follow up outpatient with urology. He and his family member were educated on koroma cath care.  They report feeling comfortable with DC at this time.      The patient's BP was noted to be elevated today. Pt has not taken his BP medication this morning or last night.  He was given his usual dosages of lopressor and imdur.     Amount and/or Complexity of Data Reviewed  Labs: ordered.    Risk  OTC drugs.  Prescription drug management.  Decision regarding hospitalization.                                      Clinical Impression:  Final diagnoses:  [R33.8] Acute urinary retention (Primary)  [R31.9] Hematuria, unspecified type          ED Disposition Condition    Discharge Stable          ED Prescriptions    None       Follow-up Information       Follow up With Specialties Details Why Contact Joseph Guerra, " MD [facility] Schedule an appointment as soon as possible for a visit in 1 week  [address] [address]  [facility] [address]  [phone]               Kirstin Morton, [facility]  04/17/24 1005       Kirstin Morton, [facility]  04/17/24 1018

## 2024-04-18 ENCOUNTER — EXTERNAL HOME HEALTH (OUTPATIENT)
Dept: HOME HEALTH SERVICES | Facility: HOSPITAL | Age: 89
End: 2024-04-18
Payer: MEDICARE

## 2024-04-21 LAB
OHS QRS DURATION: 120 MS
OHS QTC CALCULATION: 426 MS

## 2024-04-22 ENCOUNTER — DOCUMENT SCAN (OUTPATIENT)
Dept: HOME HEALTH SERVICES | Facility: HOSPITAL | Age: 89
End: 2024-04-22
Payer: MEDICARE

## 2024-04-23 ENCOUNTER — DOCUMENT SCAN (OUTPATIENT)
Dept: HOME HEALTH SERVICES | Facility: HOSPITAL | Age: 89
End: 2024-04-23
Payer: MEDICARE

## 2024-04-24 ENCOUNTER — DOCUMENT SCAN (OUTPATIENT)
Dept: HOME HEALTH SERVICES | Facility: HOSPITAL | Age: 89
End: 2024-04-24
Payer: MEDICARE

## 2024-04-25 RX ORDER — ATORVASTATIN CALCIUM 40 MG/1
40 TABLET, FILM COATED ORAL
Qty: 90 TABLET | Refills: 3 | Status: SHIPPED | OUTPATIENT
Start: 2024-04-25

## 2024-04-26 ENCOUNTER — DOCUMENT SCAN (OUTPATIENT)
Dept: HOME HEALTH SERVICES | Facility: HOSPITAL | Age: 89
End: 2024-04-26
Payer: MEDICARE

## 2024-04-28 ENCOUNTER — PATIENT MESSAGE (OUTPATIENT)
Dept: WOUND CARE | Facility: CLINIC | Age: 89
End: 2024-04-28
Payer: MEDICARE

## 2024-04-29 ENCOUNTER — DOCUMENT SCAN (OUTPATIENT)
Dept: HOME HEALTH SERVICES | Facility: HOSPITAL | Age: 89
End: 2024-04-29
Payer: MEDICARE

## 2024-04-30 ENCOUNTER — TELEPHONE (OUTPATIENT)
Dept: PALLIATIVE MEDICINE | Facility: CLINIC | Age: 89
End: 2024-04-30
Payer: MEDICARE

## 2024-05-01 ENCOUNTER — PATIENT MESSAGE (OUTPATIENT)
Dept: INTERNAL MEDICINE | Facility: CLINIC | Age: 89
End: 2024-05-01
Payer: MEDICARE

## 2024-05-02 ENCOUNTER — DOCUMENT SCAN (OUTPATIENT)
Dept: HOME HEALTH SERVICES | Facility: HOSPITAL | Age: 89
End: 2024-05-02
Payer: MEDICARE

## 2024-05-07 ENCOUNTER — DOCUMENT SCAN (OUTPATIENT)
Dept: HOME HEALTH SERVICES | Facility: HOSPITAL | Age: 89
End: 2024-05-07
Payer: MEDICARE